# Patient Record
Sex: MALE | Race: WHITE | Employment: OTHER | URBAN - METROPOLITAN AREA
[De-identification: names, ages, dates, MRNs, and addresses within clinical notes are randomized per-mention and may not be internally consistent; named-entity substitution may affect disease eponyms.]

---

## 2017-01-03 ENCOUNTER — TELEPHONE (OUTPATIENT)
Dept: INTERNAL MEDICINE | Age: 82
End: 2017-01-03

## 2017-01-06 ENCOUNTER — TELEPHONE (OUTPATIENT)
Dept: INTERNAL MEDICINE | Age: 82
End: 2017-01-06

## 2017-05-12 ENCOUNTER — OFFICE VISIT (OUTPATIENT)
Dept: INTERNAL MEDICINE | Age: 82
End: 2017-05-12

## 2017-05-12 VITALS
DIASTOLIC BLOOD PRESSURE: 72 MMHG | OXYGEN SATURATION: 98 % | HEART RATE: 68 BPM | HEIGHT: 71 IN | SYSTOLIC BLOOD PRESSURE: 130 MMHG | WEIGHT: 211 LBS | BODY MASS INDEX: 29.54 KG/M2

## 2017-05-12 DIAGNOSIS — K59.01 SLOW TRANSIT CONSTIPATION: ICD-10-CM

## 2017-05-12 DIAGNOSIS — I25.10 CORONARY ARTERY DISEASE INVOLVING NATIVE CORONARY ARTERY OF NATIVE HEART WITHOUT ANGINA PECTORIS: ICD-10-CM

## 2017-05-12 DIAGNOSIS — K51.00 ULCERATIVE PANCOLITIS WITHOUT COMPLICATION (HCC): ICD-10-CM

## 2017-05-12 DIAGNOSIS — E78.2 MIXED HYPERLIPIDEMIA: ICD-10-CM

## 2017-05-12 PROCEDURE — 4040F PNEUMOC VAC/ADMIN/RCVD: CPT | Performed by: INTERNAL MEDICINE

## 2017-05-12 PROCEDURE — 99214 OFFICE O/P EST MOD 30 MIN: CPT | Performed by: INTERNAL MEDICINE

## 2017-05-12 PROCEDURE — 1123F ACP DISCUSS/DSCN MKR DOCD: CPT | Performed by: INTERNAL MEDICINE

## 2017-05-12 PROCEDURE — G8599 NO ASA/ANTIPLAT THER USE RNG: HCPCS | Performed by: INTERNAL MEDICINE

## 2017-05-12 PROCEDURE — G8427 DOCREV CUR MEDS BY ELIG CLIN: HCPCS | Performed by: INTERNAL MEDICINE

## 2017-05-12 PROCEDURE — 4004F PT TOBACCO SCREEN RCVD TLK: CPT | Performed by: INTERNAL MEDICINE

## 2017-05-12 PROCEDURE — G8420 CALC BMI NORM PARAMETERS: HCPCS | Performed by: INTERNAL MEDICINE

## 2017-05-12 ASSESSMENT — ENCOUNTER SYMPTOMS
CONSTIPATION: 1
CHEST TIGHTNESS: 0
RESPIRATORY NEGATIVE: 1
SHORTNESS OF BREATH: 0
WHEEZING: 0

## 2017-09-27 ENCOUNTER — TELEPHONE (OUTPATIENT)
Dept: INTERNAL MEDICINE | Age: 82
End: 2017-09-27

## 2017-10-06 ENCOUNTER — TELEPHONE (OUTPATIENT)
Dept: INTERNAL MEDICINE | Age: 82
End: 2017-10-06

## 2017-10-09 NOTE — TELEPHONE ENCOUNTER
Does not want to schedule into Feb 2018. Would like to be seen before he leaves for [place]  Does not want to see Yevgeniy Preciadojayme. Feels that he has been a patient long enough and should be able to get in with his Physician. Patient leaves for [place] in November. He is able to come in November 13,14,15 or 16th.

## 2017-11-07 ENCOUNTER — OFFICE VISIT (OUTPATIENT)
Dept: INTERNAL MEDICINE | Age: 82
End: 2017-11-07

## 2017-11-07 VITALS
OXYGEN SATURATION: 98 % | HEART RATE: 74 BPM | HEIGHT: 71 IN | DIASTOLIC BLOOD PRESSURE: 74 MMHG | WEIGHT: 213.4 LBS | BODY MASS INDEX: 29.88 KG/M2 | SYSTOLIC BLOOD PRESSURE: 138 MMHG

## 2017-11-07 DIAGNOSIS — K51.00 ULCERATIVE PANCOLITIS WITHOUT COMPLICATION (HCC): ICD-10-CM

## 2017-11-07 DIAGNOSIS — Z23 NEED FOR PNEUMOCOCCAL VACCINATION: ICD-10-CM

## 2017-11-07 DIAGNOSIS — I25.10 CORONARY ARTERY DISEASE INVOLVING NATIVE CORONARY ARTERY OF NATIVE HEART WITHOUT ANGINA PECTORIS: ICD-10-CM

## 2017-11-07 DIAGNOSIS — I35.9 AORTIC VALVE DISORDER: ICD-10-CM

## 2017-11-07 DIAGNOSIS — Z12.5 ENCOUNTER FOR SCREENING FOR MALIGNANT NEOPLASM OF PROSTATE: ICD-10-CM

## 2017-11-07 DIAGNOSIS — E78.2 MIXED HYPERLIPIDEMIA: Primary | ICD-10-CM

## 2017-11-07 DIAGNOSIS — Z23 NEED FOR INFLUENZA VACCINATION: ICD-10-CM

## 2017-11-07 DIAGNOSIS — N40.0 BENIGN NON-NODULAR PROSTATIC HYPERPLASIA WITHOUT LOWER URINARY TRACT SYMPTOMS: ICD-10-CM

## 2017-11-07 PROCEDURE — 90732 PPSV23 VACC 2 YRS+ SUBQ/IM: CPT | Performed by: INTERNAL MEDICINE

## 2017-11-07 PROCEDURE — G8417 CALC BMI ABV UP PARAM F/U: HCPCS | Performed by: INTERNAL MEDICINE

## 2017-11-07 PROCEDURE — 1123F ACP DISCUSS/DSCN MKR DOCD: CPT | Performed by: INTERNAL MEDICINE

## 2017-11-07 PROCEDURE — 4040F PNEUMOC VAC/ADMIN/RCVD: CPT | Performed by: INTERNAL MEDICINE

## 2017-11-07 PROCEDURE — 4004F PT TOBACCO SCREEN RCVD TLK: CPT | Performed by: INTERNAL MEDICINE

## 2017-11-07 PROCEDURE — G8427 DOCREV CUR MEDS BY ELIG CLIN: HCPCS | Performed by: INTERNAL MEDICINE

## 2017-11-07 PROCEDURE — 99214 OFFICE O/P EST MOD 30 MIN: CPT | Performed by: INTERNAL MEDICINE

## 2017-11-07 PROCEDURE — G0009 ADMIN PNEUMOCOCCAL VACCINE: HCPCS | Performed by: INTERNAL MEDICINE

## 2017-11-07 PROCEDURE — G8484 FLU IMMUNIZE NO ADMIN: HCPCS | Performed by: INTERNAL MEDICINE

## 2017-11-07 PROCEDURE — G8599 NO ASA/ANTIPLAT THER USE RNG: HCPCS | Performed by: INTERNAL MEDICINE

## 2017-11-07 PROCEDURE — G0008 ADMIN INFLUENZA VIRUS VAC: HCPCS | Performed by: INTERNAL MEDICINE

## 2017-11-07 PROCEDURE — 90662 IIV NO PRSV INCREASED AG IM: CPT | Performed by: INTERNAL MEDICINE

## 2017-11-07 ASSESSMENT — ENCOUNTER SYMPTOMS
SHORTNESS OF BREATH: 0
WHEEZING: 0
CHEST TIGHTNESS: 0
GASTROINTESTINAL NEGATIVE: 1
RESPIRATORY NEGATIVE: 1

## 2017-11-07 NOTE — PROGRESS NOTES
Self- Management Goals for the Patient with High Cholesterol: It is important to have goals to work towards when you have High Cholesterol. Below is a list of goals your doctor would like you to work towards to help control your hyperlipidemia and also maintain and improve your overall health. Please select one of these goals to try before your next follow up visit:    Goal: I will take all my medications as prescribed, and call the office with any problems. Guess your barriers before they happen. Everyone runs into barriers to their goals. You may already know what's going to get in your way. Write down these problems (cost? time? stress? fear?), and think of ways to get around them.      Barriers to success: none  Plan for overcoming my barriers: N/A     Confidence: 10/10  Date goal set: 05/12/2017  Date goal attained:

## 2017-11-07 NOTE — PROGRESS NOTES
Subjective:      Patient ID: Jose Gore is a 80 y.o. male. HPI   Patient is here for a recheck    Vitals:    11/07/17 1146   BP: 138/74   Pulse: 74   SpO2: 98%       Wt Readings from Last 3 Encounters:   11/07/17 213 lb 6.4 oz (96.8 kg)   05/12/17 211 lb (95.7 kg)   11/11/16 211 lb (95.7 kg)     Patient is taking their cholesterol medication and watching diet without problem. He had a colonoscopy 6 weeks ago. All was good. He states his colitis is under control. He has no chest pain or angina. He is following his aortic valve and CAD with Dr Kathy Elder. He had an ECHO that looked OK. Discussed use, benefit, and side effects of prescribed medications. Barriers to medication compliance addressed. All patient questions answered. Pt voiced understanding. Review of Systems   Constitutional: Negative. HENT: Negative. Respiratory: Negative. Negative for chest tightness, shortness of breath and wheezing. Cardiovascular: Negative. Negative for chest pain, palpitations and leg swelling. Gastrointestinal: Negative. Genitourinary: Negative. Musculoskeletal: Negative for arthralgias and myalgias. Neurological: Negative. Objective:   Physical Exam   Constitutional: He appears well-developed and well-nourished. Neck: Trachea normal. No JVD present. Carotid bruit is not present. No edema present. Cardiovascular: Normal rate, regular rhythm, S1 normal and S2 normal.    No murmur heard. Pulmonary/Chest: Effort normal and breath sounds normal. He has no wheezes. He has no rhonchi. He has no rales. Assessment:      See Problem List assessment and plan       Plan:       Aortic valve disorder  ECHO reviewed  Following with cardiology    CAD (coronary artery disease)  Stable  No angina  No chest pain or SOB      Hyperlipidemia  Stable with present treatment  Check fasting lipids      Ulcerative colitis ([address]  Following with GI  Had negative colonoscopy    Benign non-nodular prostatic

## 2017-12-07 ENCOUNTER — TELEPHONE (OUTPATIENT)
Dept: INTERNAL MEDICINE | Age: 82
End: 2017-12-07

## 2017-12-07 NOTE — TELEPHONE ENCOUNTER
Pt has labs done about 3 weeks ago at [facility] in [place]  He wants to know if lab results are available  He does not want them to to through [address]   Please mail results to pt at:    [address]

## 2017-12-29 LAB
ALBUMIN SERPL-MCNC: NORMAL G/DL
ALP BLD-CCNC: NORMAL U/L
ALT SERPL-CCNC: NORMAL U/L
ANION GAP SERPL CALCULATED.3IONS-SCNC: NORMAL MMOL/L
AST SERPL-CCNC: NORMAL U/L
BILIRUB SERPL-MCNC: NORMAL MG/DL (ref 0.1–1.4)
BILIRUBIN, URINE: NORMAL
BLOOD, URINE: NORMAL
BUN BLDV-MCNC: NORMAL MG/DL
CALCIUM SERPL-MCNC: NORMAL MG/DL
CHLORIDE BLD-SCNC: NORMAL MMOL/L
CHOLESTEROL, TOTAL: 146 MG/DL
CHOLESTEROL/HDL RATIO: NORMAL
CLARITY: NORMAL
CO2: NORMAL MMOL/L
COLOR: NORMAL
CREAT SERPL-MCNC: NORMAL MG/DL
GFR CALCULATED: NORMAL
GLUCOSE BLD-MCNC: NORMAL MG/DL
GLUCOSE URINE: NORMAL
HCT VFR BLD CALC: NORMAL % (ref 41–53)
HDLC SERPL-MCNC: 52 MG/DL (ref 35–70)
HEMOGLOBIN: NORMAL G/DL (ref 13.5–17.5)
KETONES, URINE: NORMAL
LDL CHOLESTEROL CALCULATED: 83 MG/DL (ref 0–160)
LEUKOCYTE ESTERASE, URINE: NORMAL
NITRITE, URINE: NORMAL
PH UA: NORMAL (ref 4.5–8)
PLATELET # BLD: NORMAL K/ΜL
POTASSIUM SERPL-SCNC: NORMAL MMOL/L
PROTEIN UA: NORMAL
SODIUM BLD-SCNC: NORMAL MMOL/L
SPECIFIC GRAVITY, URINE: NORMAL
TOTAL PROTEIN: NORMAL
TRIGL SERPL-MCNC: 57 MG/DL
UROBILINOGEN, URINE: NORMAL
VLDLC SERPL CALC-MCNC: 11 MG/DL
WBC # BLD: NORMAL 10^3/ML

## 2018-05-15 ENCOUNTER — OFFICE VISIT (OUTPATIENT)
Dept: INTERNAL MEDICINE | Age: 83
End: 2018-05-15

## 2018-05-15 VITALS
WEIGHT: 212 LBS | BODY MASS INDEX: 29.68 KG/M2 | SYSTOLIC BLOOD PRESSURE: 118 MMHG | OXYGEN SATURATION: 97 % | HEIGHT: 71 IN | HEART RATE: 74 BPM | DIASTOLIC BLOOD PRESSURE: 68 MMHG

## 2018-05-15 DIAGNOSIS — R31.0 GROSS HEMATURIA: ICD-10-CM

## 2018-05-15 DIAGNOSIS — Z13.1 SCREENING FOR DIABETES MELLITUS (DM): ICD-10-CM

## 2018-05-15 DIAGNOSIS — Z12.11 SCREENING FOR COLORECTAL CANCER: ICD-10-CM

## 2018-05-15 DIAGNOSIS — Z12.5 SCREENING FOR PROSTATE CANCER: ICD-10-CM

## 2018-05-15 DIAGNOSIS — Z12.12 SCREENING FOR COLORECTAL CANCER: ICD-10-CM

## 2018-05-15 DIAGNOSIS — K59.01 SLOW TRANSIT CONSTIPATION: ICD-10-CM

## 2018-05-15 DIAGNOSIS — I35.9 AORTIC VALVE DISORDER: ICD-10-CM

## 2018-05-15 DIAGNOSIS — Z00.00 ROUTINE GENERAL MEDICAL EXAMINATION AT A HEALTH CARE FACILITY: Primary | ICD-10-CM

## 2018-05-15 DIAGNOSIS — E78.2 MIXED HYPERLIPIDEMIA: ICD-10-CM

## 2018-05-15 DIAGNOSIS — Z13.6 SCREENING FOR CARDIOVASCULAR CONDITION: ICD-10-CM

## 2018-05-15 LAB
CONTROL: NORMAL
HEMOCCULT STL QL: NEGATIVE

## 2018-05-15 PROCEDURE — G0439 PPPS, SUBSEQ VISIT: HCPCS | Performed by: INTERNAL MEDICINE

## 2018-05-15 PROCEDURE — 4004F PT TOBACCO SCREEN RCVD TLK: CPT | Performed by: INTERNAL MEDICINE

## 2018-05-15 PROCEDURE — G8417 CALC BMI ABV UP PARAM F/U: HCPCS | Performed by: INTERNAL MEDICINE

## 2018-05-15 PROCEDURE — G0328 FECAL BLOOD SCRN IMMUNOASSAY: HCPCS | Performed by: INTERNAL MEDICINE

## 2018-05-15 PROCEDURE — 99213 OFFICE O/P EST LOW 20 MIN: CPT | Performed by: INTERNAL MEDICINE

## 2018-05-15 PROCEDURE — 93000 ELECTROCARDIOGRAM COMPLETE: CPT | Performed by: INTERNAL MEDICINE

## 2018-05-15 PROCEDURE — 4040F PNEUMOC VAC/ADMIN/RCVD: CPT | Performed by: INTERNAL MEDICINE

## 2018-05-15 PROCEDURE — G8599 NO ASA/ANTIPLAT THER USE RNG: HCPCS | Performed by: INTERNAL MEDICINE

## 2018-05-15 PROCEDURE — G8427 DOCREV CUR MEDS BY ELIG CLIN: HCPCS | Performed by: INTERNAL MEDICINE

## 2018-05-15 PROCEDURE — 1123F ACP DISCUSS/DSCN MKR DOCD: CPT | Performed by: INTERNAL MEDICINE

## 2018-05-15 ASSESSMENT — ENCOUNTER SYMPTOMS
BACK PAIN: 0
SINUS PRESSURE: 0
CHEST TIGHTNESS: 0
ABDOMINAL PAIN: 0
RHINORRHEA: 0
VOMITING: 0
EYE ITCHING: 0
VOICE CHANGE: 0
RECTAL PAIN: 0
EYE DISCHARGE: 0
TROUBLE SWALLOWING: 0
BLOOD IN STOOL: 0
EYE REDNESS: 0
ABDOMINAL DISTENTION: 0
APNEA: 0
COLOR CHANGE: 0
STRIDOR: 0
CONSTIPATION: 1
SORE THROAT: 0
ANAL BLEEDING: 0
CHOKING: 0
SHORTNESS OF BREATH: 0
WHEEZING: 0
PHOTOPHOBIA: 0
COUGH: 0
DIARRHEA: 0
EYE PAIN: 0
NAUSEA: 0
FACIAL SWELLING: 0

## 2018-05-15 ASSESSMENT — LIFESTYLE VARIABLES: HOW OFTEN DO YOU HAVE A DRINK CONTAINING ALCOHOL: 0

## 2018-05-15 ASSESSMENT — ANXIETY QUESTIONNAIRES: GAD7 TOTAL SCORE: 0

## 2018-05-15 ASSESSMENT — PATIENT HEALTH QUESTIONNAIRE - PHQ9: SUM OF ALL RESPONSES TO PHQ QUESTIONS 1-9: 0

## 2018-05-18 DIAGNOSIS — Z12.5 SCREENING FOR PROSTATE CANCER: ICD-10-CM

## 2018-05-18 DIAGNOSIS — R31.0 GROSS HEMATURIA: ICD-10-CM

## 2018-05-18 DIAGNOSIS — Z00.00 ROUTINE GENERAL MEDICAL EXAMINATION AT A HEALTH CARE FACILITY: ICD-10-CM

## 2018-05-18 DIAGNOSIS — Z13.6 SCREENING FOR CARDIOVASCULAR CONDITION: ICD-10-CM

## 2018-05-18 DIAGNOSIS — E78.2 MIXED HYPERLIPIDEMIA: ICD-10-CM

## 2018-05-18 LAB
A/G RATIO: 2 (ref 1.1–2.2)
ALBUMIN SERPL-MCNC: 4.1 G/DL (ref 3.4–5)
ALP BLD-CCNC: 53 U/L (ref 40–129)
ALT SERPL-CCNC: 18 U/L (ref 10–40)
ANION GAP SERPL CALCULATED.3IONS-SCNC: 11 MMOL/L (ref 3–16)
AST SERPL-CCNC: 21 U/L (ref 15–37)
BASOPHILS ABSOLUTE: 0 K/UL (ref 0–0.2)
BASOPHILS RELATIVE PERCENT: 0.6 %
BILIRUB SERPL-MCNC: 1.5 MG/DL (ref 0–1)
BILIRUBIN URINE: NEGATIVE
BLOOD, URINE: NEGATIVE
BUN BLDV-MCNC: 24 MG/DL (ref 7–20)
CALCIUM SERPL-MCNC: 8.7 MG/DL (ref 8.3–10.6)
CHLORIDE BLD-SCNC: 102 MMOL/L (ref 99–110)
CHOLESTEROL, TOTAL: 125 MG/DL (ref 0–199)
CLARITY: CLEAR
CO2: 28 MMOL/L (ref 21–32)
COLOR: YELLOW
CREAT SERPL-MCNC: 1 MG/DL (ref 0.8–1.3)
EOSINOPHILS ABSOLUTE: 0.1 K/UL (ref 0–0.6)
EOSINOPHILS RELATIVE PERCENT: 3.1 %
EPITHELIAL CELLS, UA: 0 /HPF (ref 0–5)
GFR AFRICAN AMERICAN: >60
GFR NON-AFRICAN AMERICAN: >60
GLOBULIN: 2.1 G/DL
GLUCOSE BLD-MCNC: 91 MG/DL (ref 70–99)
GLUCOSE URINE: NEGATIVE MG/DL
HCT VFR BLD CALC: 39.8 % (ref 40.5–52.5)
HDLC SERPL-MCNC: 52 MG/DL (ref 40–60)
HEMOGLOBIN: 13.9 G/DL (ref 13.5–17.5)
HYALINE CASTS: 0 /LPF (ref 0–8)
KETONES, URINE: NEGATIVE MG/DL
LDL CHOLESTEROL CALCULATED: 63 MG/DL
LEUKOCYTE ESTERASE, URINE: NEGATIVE
LYMPHOCYTES ABSOLUTE: 0.8 K/UL (ref 1–5.1)
LYMPHOCYTES RELATIVE PERCENT: 17.9 %
MCH RBC QN AUTO: 34.6 PG (ref 26–34)
MCHC RBC AUTO-ENTMCNC: 34.9 G/DL (ref 31–36)
MCV RBC AUTO: 99 FL (ref 80–100)
MICROSCOPIC EXAMINATION: YES
MONOCYTES ABSOLUTE: 0.4 K/UL (ref 0–1.3)
MONOCYTES RELATIVE PERCENT: 8.9 %
NEUTROPHILS ABSOLUTE: 3 K/UL (ref 1.7–7.7)
NEUTROPHILS RELATIVE PERCENT: 69.5 %
NITRITE, URINE: NEGATIVE
PDW BLD-RTO: 16.4 % (ref 12.4–15.4)
PH UA: 6.5
PLATELET # BLD: 175 K/UL (ref 135–450)
PMV BLD AUTO: 9.4 FL (ref 5–10.5)
POTASSIUM SERPL-SCNC: 4.8 MMOL/L (ref 3.5–5.1)
PROSTATE SPECIFIC ANTIGEN: 11.81 NG/ML (ref 0–4)
PROTEIN UA: ABNORMAL MG/DL
RBC # BLD: 4.02 M/UL (ref 4.2–5.9)
RBC UA: 3 /HPF (ref 0–4)
SODIUM BLD-SCNC: 141 MMOL/L (ref 136–145)
SPECIFIC GRAVITY UA: 1.02
TOTAL PROTEIN: 6.2 G/DL (ref 6.4–8.2)
TRIGL SERPL-MCNC: 51 MG/DL (ref 0–150)
UROBILINOGEN, URINE: 1 E.U./DL
VLDLC SERPL CALC-MCNC: 10 MG/DL
WBC # BLD: 4.3 K/UL (ref 4–11)
WBC UA: 1 /HPF (ref 0–5)

## 2018-05-20 LAB — VITAMIN D 1,25-DIHYDROXY: 31.5 PG/ML (ref 19.9–79.3)

## 2018-06-01 ENCOUNTER — OFFICE VISIT (OUTPATIENT)
Dept: INTERNAL MEDICINE | Age: 83
End: 2018-06-01

## 2018-06-01 VITALS
BODY MASS INDEX: 30.06 KG/M2 | OXYGEN SATURATION: 97 % | HEIGHT: 70 IN | HEART RATE: 64 BPM | SYSTOLIC BLOOD PRESSURE: 134 MMHG | TEMPERATURE: 98.6 F | WEIGHT: 210 LBS | DIASTOLIC BLOOD PRESSURE: 78 MMHG

## 2018-06-01 DIAGNOSIS — H60.311 ACUTE DIFFUSE OTITIS EXTERNA OF RIGHT EAR: Primary | ICD-10-CM

## 2018-06-01 PROCEDURE — G8427 DOCREV CUR MEDS BY ELIG CLIN: HCPCS | Performed by: NURSE PRACTITIONER

## 2018-06-01 PROCEDURE — G8417 CALC BMI ABV UP PARAM F/U: HCPCS | Performed by: NURSE PRACTITIONER

## 2018-06-01 PROCEDURE — 1123F ACP DISCUSS/DSCN MKR DOCD: CPT | Performed by: NURSE PRACTITIONER

## 2018-06-01 PROCEDURE — 4004F PT TOBACCO SCREEN RCVD TLK: CPT | Performed by: NURSE PRACTITIONER

## 2018-06-01 PROCEDURE — G8599 NO ASA/ANTIPLAT THER USE RNG: HCPCS | Performed by: NURSE PRACTITIONER

## 2018-06-01 PROCEDURE — 4040F PNEUMOC VAC/ADMIN/RCVD: CPT | Performed by: NURSE PRACTITIONER

## 2018-06-01 PROCEDURE — 99213 OFFICE O/P EST LOW 20 MIN: CPT | Performed by: NURSE PRACTITIONER

## 2018-06-01 PROCEDURE — 4130F TOPICAL PREP RX AOE: CPT | Performed by: NURSE PRACTITIONER

## 2018-06-01 RX ORDER — CIPROFLOXACIN AND DEXAMETHASONE 3; 1 MG/ML; MG/ML
4 SUSPENSION/ DROPS AURICULAR (OTIC) 2 TIMES DAILY
Qty: 7.5 ML | Refills: 0 | Status: SHIPPED | OUTPATIENT
Start: 2018-06-01 | End: 2018-09-10 | Stop reason: SDUPTHER

## 2018-06-01 ASSESSMENT — ENCOUNTER SYMPTOMS
VOMITING: 0
RHINORRHEA: 0
SINUS PRESSURE: 0
CONSTIPATION: 0
COUGH: 0
DIARRHEA: 0
SINUS PAIN: 0
WHEEZING: 0
FACIAL SWELLING: 0
SHORTNESS OF BREATH: 0
NAUSEA: 0

## 2018-07-25 ENCOUNTER — PROCEDURE VISIT (OUTPATIENT)
Dept: SURGERY | Age: 83
End: 2018-07-25

## 2018-07-25 VITALS
OXYGEN SATURATION: 98 % | BODY MASS INDEX: 30.28 KG/M2 | TEMPERATURE: 98.1 F | SYSTOLIC BLOOD PRESSURE: 150 MMHG | WEIGHT: 211 LBS | DIASTOLIC BLOOD PRESSURE: 90 MMHG | HEART RATE: 63 BPM

## 2018-07-25 DIAGNOSIS — C44.02 SQUAMOUS CELL CARCINOMA OF LIP: Primary | ICD-10-CM

## 2018-07-25 NOTE — PATIENT INSTRUCTIONS
[facility] Office Hours:    Monday-Thursday  7:30 AM-4:30 PM    Friday  9:00 AM-3:00 PM    The patient was given post operative care instructions for second intention healing. All questions answered.

## 2018-07-25 NOTE — PROGRESS NOTES
MOHS PROCEDURE NOTE    PHYSICIAN:  Leroy Aceves. Ana Rosa Gates MD    ASSISTANT: Devi Dos Santos RN and Lila Hinson     REFERRING PROVIDER:  Nubia Gonzalez MD, Ph.D    PREOPERATIVE DIAGNOSIS: Invasive well-differentiated Squamous Cell Carcinoma     SPECIFIC MOHS INDICATIONS:  location    [facility] SCORIN/9    POSTOPERATIVE DIAGNOSIS: SAME    LOCATION: Left lower lip    OPERATIVE PROCEDURE:  MOHS MICROGRAPHIC SURGERY    RECONSTRUCTION OF DEFECT: Second Intention Wound Healing    PREOPERATIVE SIZE: 9x6 MM    DEFECT SIZE: 23x8 MM    LENGTH OF REPAIRED WOUND/SIZE OF FLAP/SIZE OF GRAFT:  N/A MM    ANESTHESIA:  2mL 1% lidocaine with epinephrine 1:100,000 buffered. EBL:  MINIMAL    DURATION OF PROCEDURE:  1 HOURS    POSTOPERATIVE OBSERVATION: 1 HOUR    SPECIMENS:  SEE MOHS MAP    COMPLICATIONS:  NONE    DESCRIPTION OF PROCEDURE:  The patient was given a mirror, as appropriate, and the biopsy site was identified, marked with a surgical marking pen, and verified by the patient. Options for treatment were discussed and the patient was informed that Mohs surgery was the selected treatment based on its lower recurrence rate, given the features listed above, as compared to other treatment modalities such as excision, radiation, or curettage, and agreed with this treatment plan. Risks and benefits including bruising, swelling, bleeding, infection, nerve injury, recurrence, and scarring were discussed with the patient prior to the procedure and a written consent detailing these and other risks was reviewed with the patient and signed. There was a time out for person and procedure verification. The surgical site was prepped with an antiseptic solution. Application of an antiseptic solution was repeated before each surgical stage. Stage I:  The clinically-apparent tumor was carefully defined and debulked, determining the edge of the surgical excision.     A thin layer of tumor-laden tissue was excised with a narrow margin of normal-appearing skin, using the technique of Mohs. A map was prepared to correspond to the area of skin from which it was excised. Hemostasis was achieved using electrosurgery. The wound was bandaged. The tissue was prepared for the cryostat and sectioned. 1 section(s) prepared. Each section was coded, cut, and stained for microscopic examination. The entire base and margins of the excised piece of tissue were examined by the surgeon. Stage I, II:  Superficially invasive well differentiated SCC. The remaining tumor was noted and the next stage was performed. Stage II, III:  A thin layer of tissue was removed at the histologically-identified sites of remaining tumor. The entire procedure as described in stage I was repeated to process the tissue according to Mohs technique. 1 section(s) prepared for stage II and III. No tumor was identified at the peripheral margins of stage III of microscopically controlled surgery. Actinic cheilitis present. DEFECT MANAGEMENT:    REPAIR DESCRIPTION:  After discussion with the patient regarding the various options, it was decided to allow the defect to heal by second intention (granulation). Healing time, wound care and scarring were discussed with the patient and he/she feels capable of taking care of the wound. WOUND COVERAGE:  The wound was cleaned with normal saline solution, dried off, Aquaphor ointment was applied, and the wound was covered. A dressing was applied for stabilization and light pressure. The patient was given detailed oral and written instructions on postoperative care. There were no complications. The patient left the Unit in good medical condition. FOLLOW-UP: follow up wound check in 2 weeks.

## 2018-07-26 ENCOUNTER — TELEPHONE (OUTPATIENT)
Dept: SURGERY | Age: 83
End: 2018-07-26

## 2018-08-03 ENCOUNTER — TELEPHONE (OUTPATIENT)
Dept: SURGERY | Age: 83
End: 2018-08-03

## 2018-08-03 ENCOUNTER — NURSE ONLY (OUTPATIENT)
Dept: SURGERY | Age: 83
End: 2018-08-03

## 2018-08-03 DIAGNOSIS — Z51.89 VISIT FOR WOUND CHECK: Primary | ICD-10-CM

## 2018-08-03 NOTE — PATIENT INSTRUCTIONS
[facility] Office Hours:    Monday-Thursday  7:30 AM-4:30 PM    Friday  9:00 AM-3:00 PM

## 2018-08-08 ENCOUNTER — PROCEDURE VISIT (OUTPATIENT)
Dept: SURGERY | Age: 83
End: 2018-08-08

## 2018-08-08 VITALS
BODY MASS INDEX: 30.28 KG/M2 | HEART RATE: 70 BPM | TEMPERATURE: 98.2 F | SYSTOLIC BLOOD PRESSURE: 184 MMHG | DIASTOLIC BLOOD PRESSURE: 91 MMHG | OXYGEN SATURATION: 97 % | WEIGHT: 211 LBS

## 2018-08-08 DIAGNOSIS — C44.311 BASAL CELL CARCINOMA (BCC) OF RIGHT NASAL TIP: Primary | ICD-10-CM

## 2018-08-08 PROCEDURE — 17311 MOHS 1 STAGE H/N/HF/G: CPT | Performed by: DERMATOLOGY

## 2018-08-08 PROCEDURE — 17312 MOHS ADDL STAGE: CPT | Performed by: DERMATOLOGY

## 2018-08-08 PROCEDURE — 14060 TIS TRNFR E/N/E/L 10 SQ CM/<: CPT | Performed by: DERMATOLOGY

## 2018-08-08 NOTE — PROGRESS NOTES
MOHS PROCEDURE NOTE    PHYSICIAN:  Danilo Mccord. Cassy Rose MD    ASSISTANT: Sadia Hudson LPN and Lila Blackwell       REFERRING PROVIDER:  Kerrie Parker MD, Ph.D      PREOPERATIVE DIAGNOSIS: Superficial Basal Cell Carcinoma     SPECIFIC MOHS INDICATIONS:  location and clinically ill-defined borders    AUC SCORIN/9    POSTOPERATIVE DIAGNOSIS: SAME    LOCATION: right nasal tip    OPERATIVE PROCEDURE:  MOHS MICROGRAPHIC SURGERY    RECONSTRUCTION OF DEFECT: V to Y Advancement Flap (non-tunneled island pedicle flap)    PREOPERATIVE SIZE: 11x9 MM    DEFECT SIZE: 14x12 MM    LENGTH OF REPAIRED WOUND/SIZE OF FLAP/SIZE OF GRAFT:  28x9 MM = 2.52cm2    ANESTHESIA:  5.5mL 1% lidocaine with epinephrine 1:100,000 buffered. EBL:  MINIMAL    DURATION OF PROCEDURE:  2 HOURS    POSTOPERATIVE OBSERVATION: 1 HOUR    SPECIMENS:  SEE MOHS MAP    COMPLICATIONS:  NONE    DESCRIPTION OF PROCEDURE:  The patient was given a mirror, as appropriate, and the biopsy site was identified, marked with a surgical marking pen, and verified by the patient. Options for treatment were discussed and the patient was informed that Mohs surgery was the selected treatment based on its lower recurrence rate, given the features listed above, as compared to other treatment modalities such as excision, radiation, or curettage, and agreed with this treatment plan. Risks and benefits including bruising, swelling, bleeding, infection, nerve injury, recurrence, and scarring were discussed with the patient prior to the procedure and a written consent detailing these and other risks was reviewed with the patient and signed. There was a time out for person and procedure verification. The surgical site was prepped with an antiseptic solution. Application of an antiseptic solution was repeated before each surgical stage. Stage I:  The clinically-apparent tumor was carefully defined and debulked, determining the edge of the surgical excision.     A thin layer of tumor-laden tissue was excised with a narrow margin of normal-appearing skin, using the technique of Mohs. A map was prepared to correspond to the area of skin from which it was excised. Hemostasis was achieved using electrosurgery. The wound was bandaged. The tissue was prepared for the cryostat and sectioned. 1 section(s) prepared. Each section was coded, cut, and stained for microscopic examination. The entire base and margins of the excised piece of tissue were examined by the surgeon. Stage I:  Infiltrative BCC: small irregular clumps of basaloid cells present as narrow strands with limited peripheral palisading. The remaining tumor was noted and the next stage was performed. Stage II:  A thin layer of tissue was removed at the histologically-identified sites of remaining tumor. The entire procedure as described in stage I was repeated to process the tissue according to Mohs technique. 1 section(s) prepared for stage II. No tumor was identified at the peripheral margins of stage II of microscopically controlled surgery. DEFECT MANAGEMENT:    REPAIR DESCRIPTION:  Various closure modalities were discussed with the patient, and it was decided that a V to Y Advancement Flap (non-tunneled island pedicle flap) would best preserve normal anatomic and functional relationships. Additional risks of flap necrosis, irregular scar line and wound dehiscence were discussed. The patient was placed on the procedure room table. The area was anesthetized with 1% lidocaine with epinephrine 1:100,000 buffered, was given a sterile prep using Chlorhexidine gluconate 4% solution  and draped in the usual sterile fashion. Incisions were made in the appropriate fashion for the flap designed. The wound was extensively undermined and meticulous hemostasis were obtained using spot monopolar electrocoagulation. The flap was elevated and advanced into the primary defect.  Subcutaneous dead space and dermis were closed using  5-0  Vicryl and the epidermis was approximated using 6-0 Ethilon running epidermal sutures . WOUND COVERAGE:  The wound was cleaned with normal saline solution, dried off, Aquaphor ointment was applied, and the wound was covered. A dressing was applied for stabilization and light pressure. The patient was given detailed oral and written instructions on postoperative care. There were no complications. The patient left the Unit in good medical condition. FOLLOW-UP:  The patient will return for suture removal in 7 days.

## 2018-08-08 NOTE — PATIENT INSTRUCTIONS
[facility] Office Hours:    Monday-Thursday  7:30 AM-4:30 PM    Friday  9:00 AM-3:00 PM    Patient was given post operative instructions for facial sutures. All questions were reviewed.

## 2018-08-09 ENCOUNTER — TELEPHONE (OUTPATIENT)
Dept: SURGERY | Age: 83
End: 2018-08-09

## 2018-08-09 NOTE — TELEPHONE ENCOUNTER
The patient was in the office 8/8/18 for mohs located on the right nasal tip with V to Y Advancement Flap (non-tunneled island pedicle flap)  repair. The patient tolerated the procedure well and left the office in good condition. A post-operative telephone call was placed at 2:37pm on 8/9/18 in order to check on the patient's recovery process. The patient's wife reported the patient was doing well and had no complaints. All questions answered.

## 2018-08-14 ENCOUNTER — OFFICE VISIT (OUTPATIENT)
Dept: SURGERY | Age: 83
End: 2018-08-14

## 2018-08-14 DIAGNOSIS — Z48.02 VISIT FOR SUTURE REMOVAL: Primary | ICD-10-CM

## 2018-08-14 PROCEDURE — 99024 POSTOP FOLLOW-UP VISIT: CPT | Performed by: DERMATOLOGY

## 2018-08-14 NOTE — PROGRESS NOTES
S:  The patient is here for suture removal s/p Mohs surgery on the right nasal tip and V to Y Advancement Flap (non-tunneled island pedicle flap) repair, 1 week(s) ago. The site appears well-healed without signs of infection (redness, pain or discharge). The sutures were removed. Flap is slightly macerated. Wound care and activity instructions given. The patient was scheduled for follow-up in 2 weeks for scar/wound check. The patient was scheduled for f/u with General Dermatology per their instructions.

## 2018-08-14 NOTE — PATIENT INSTRUCTIONS
[facility] Office Hours:    Monday-Thursday  7:30 AM-4:30 PM    Friday  9:00 AM-3:00 PM    WOUND CARE AFTER SUTURE REMOVAL    After your stiches have been removed, your scar is still very fragile. In fact, scars continue to change and evolve, what we call remodel, for about a year after your procedure. Follow the following steps below to ensure that your scar heals well. Instructions    1. If Steri-strips were applied, keep them on until they fall off on their own. 2. Protect your scar from the sun. Use a sunscreen or bandage to cover your scar. Claudette Dunks exposure can cause your scar to become discolored and appear red or brown. 3. To help soften your scar more rapidly, it is helpful, but not necessary, for you to   massage the scar gently each night for twenty minutes. 4. Spitting suture. Occasionally, an inside suture (stitch) does not completely dissolve. When this happens, (generally 4-8 weeks after surgery), it causes a bump or pimple to form on the scar. This is easily removed and is not at all serious. It   does not mean the skin cancer has returned. Contact us if it happens, but do not be alarmed. 5. If you scar becomes tender, itchy or becomes very large, let Dr. Sonido Andrade know. There    are treatments that can improve the appearance of your scar or help make it more comfortable.

## 2018-08-17 LAB
A/G RATIO: 1.9 (ref 1.1–2.2)
ALBUMIN SERPL-MCNC: 4.1 G/DL (ref 3.4–5)
ALP BLD-CCNC: 65 U/L (ref 40–129)
ALT SERPL-CCNC: 18 U/L (ref 10–40)
ANION GAP SERPL CALCULATED.3IONS-SCNC: 12 MMOL/L (ref 3–16)
AST SERPL-CCNC: 19 U/L (ref 15–37)
BILIRUB SERPL-MCNC: 1.3 MG/DL (ref 0–1)
BUN BLDV-MCNC: 23 MG/DL (ref 7–20)
CALCIUM SERPL-MCNC: 9.1 MG/DL (ref 8.3–10.6)
CHLORIDE BLD-SCNC: 101 MMOL/L (ref 99–110)
CO2: 30 MMOL/L (ref 21–32)
CREAT SERPL-MCNC: 1.1 MG/DL (ref 0.8–1.3)
GFR AFRICAN AMERICAN: >60
GFR NON-AFRICAN AMERICAN: >60
GLOBULIN: 2.2 G/DL
GLUCOSE BLD-MCNC: 95 MG/DL (ref 70–99)
HCT VFR BLD CALC: 41.6 % (ref 40.5–52.5)
HEMOGLOBIN: 14 G/DL (ref 13.5–17.5)
MCH RBC QN AUTO: 34.2 PG (ref 26–34)
MCHC RBC AUTO-ENTMCNC: 33.7 G/DL (ref 31–36)
MCV RBC AUTO: 101.4 FL (ref 80–100)
PDW BLD-RTO: 17.1 % (ref 12.4–15.4)
PLATELET # BLD: 191 K/UL (ref 135–450)
PMV BLD AUTO: 9.6 FL (ref 5–10.5)
POTASSIUM SERPL-SCNC: 4.7 MMOL/L (ref 3.5–5.1)
RBC # BLD: 4.1 M/UL (ref 4.2–5.9)
SODIUM BLD-SCNC: 143 MMOL/L (ref 136–145)
TOTAL PROTEIN: 6.3 G/DL (ref 6.4–8.2)
WBC # BLD: 5.4 K/UL (ref 4–11)

## 2018-08-29 ENCOUNTER — OFFICE VISIT (OUTPATIENT)
Dept: SURGERY | Age: 83
End: 2018-08-29

## 2018-08-29 DIAGNOSIS — Z51.89 VISIT FOR WOUND CHECK: Primary | ICD-10-CM

## 2018-08-29 PROCEDURE — 99024 POSTOP FOLLOW-UP VISIT: CPT | Performed by: DERMATOLOGY

## 2018-09-10 ENCOUNTER — OFFICE VISIT (OUTPATIENT)
Dept: INTERNAL MEDICINE | Age: 83
End: 2018-09-10

## 2018-09-10 VITALS
SYSTOLIC BLOOD PRESSURE: 144 MMHG | BODY MASS INDEX: 30.78 KG/M2 | WEIGHT: 215 LBS | OXYGEN SATURATION: 98 % | HEIGHT: 70 IN | HEART RATE: 78 BPM | DIASTOLIC BLOOD PRESSURE: 89 MMHG

## 2018-09-10 DIAGNOSIS — H60.311 ACUTE DIFFUSE OTITIS EXTERNA OF RIGHT EAR: ICD-10-CM

## 2018-09-10 DIAGNOSIS — Z23 NEEDS FLU SHOT: Primary | ICD-10-CM

## 2018-09-10 PROCEDURE — G8599 NO ASA/ANTIPLAT THER USE RNG: HCPCS | Performed by: NURSE PRACTITIONER

## 2018-09-10 PROCEDURE — G8417 CALC BMI ABV UP PARAM F/U: HCPCS | Performed by: NURSE PRACTITIONER

## 2018-09-10 PROCEDURE — 99213 OFFICE O/P EST LOW 20 MIN: CPT | Performed by: NURSE PRACTITIONER

## 2018-09-10 PROCEDURE — 4130F TOPICAL PREP RX AOE: CPT | Performed by: NURSE PRACTITIONER

## 2018-09-10 PROCEDURE — 4004F PT TOBACCO SCREEN RCVD TLK: CPT | Performed by: NURSE PRACTITIONER

## 2018-09-10 PROCEDURE — 1101F PT FALLS ASSESS-DOCD LE1/YR: CPT | Performed by: NURSE PRACTITIONER

## 2018-09-10 PROCEDURE — 1123F ACP DISCUSS/DSCN MKR DOCD: CPT | Performed by: NURSE PRACTITIONER

## 2018-09-10 PROCEDURE — 4040F PNEUMOC VAC/ADMIN/RCVD: CPT | Performed by: NURSE PRACTITIONER

## 2018-09-10 PROCEDURE — 90662 IIV NO PRSV INCREASED AG IM: CPT | Performed by: NURSE PRACTITIONER

## 2018-09-10 PROCEDURE — G8427 DOCREV CUR MEDS BY ELIG CLIN: HCPCS | Performed by: NURSE PRACTITIONER

## 2018-09-10 PROCEDURE — G0008 ADMIN INFLUENZA VIRUS VAC: HCPCS | Performed by: NURSE PRACTITIONER

## 2018-09-10 RX ORDER — CIPROFLOXACIN AND DEXAMETHASONE 3; 1 MG/ML; MG/ML
4 SUSPENSION/ DROPS AURICULAR (OTIC) 2 TIMES DAILY
Qty: 7.5 ML | Refills: 0 | Status: SHIPPED | OUTPATIENT
Start: 2018-09-10 | End: 2018-09-17

## 2018-09-10 ASSESSMENT — ENCOUNTER SYMPTOMS
SINUS PRESSURE: 0
SINUS PAIN: 0
SHORTNESS OF BREATH: 0
WHEEZING: 0
COUGH: 0

## 2018-09-10 ASSESSMENT — PATIENT HEALTH QUESTIONNAIRE - PHQ9
SUM OF ALL RESPONSES TO PHQ QUESTIONS 1-9: 0
2. FEELING DOWN, DEPRESSED OR HOPELESS: 0
SUM OF ALL RESPONSES TO PHQ9 QUESTIONS 1 & 2: 0
SUM OF ALL RESPONSES TO PHQ QUESTIONS 1-9: 0
1. LITTLE INTEREST OR PLEASURE IN DOING THINGS: 0

## 2018-09-10 NOTE — PROGRESS NOTES
9/10/2018     Ethel Rodrigues (:  1934) is a 80 y.o. male, here for evaluation of the following medical concerns:    HPI  Marquis Bowens is here today for left ear discomfort. He states that he has new hearing aids that he is trying out and they have felt clogged. He states that there is minimal pain. Started a few days ago. Had an ear infection similar a few months ago and treated with ear drops and it improved. He denies any fevers. No other URI symptoms. Review of Systems   Constitutional: Negative for fatigue and fever. HENT: Positive for ear discharge (left ear) and ear pain (minimal ). Negative for sinus pain and sinus pressure. Respiratory: Negative for cough, shortness of breath and wheezing. Cardiovascular: Negative for chest pain. Hematological: Negative for adenopathy. Prior to Visit Medications    Medication Sig Taking? Authorizing Provider   ciprofloxacin-dexamethasone (CIPRODEX) 0.3-0.1 % otic suspension Place 4 drops into the left ear 2 times daily for 7 days Yes LIZZ Shen - CNP   linaclotide (LINZESS) 145 MCG capsule Take 1 capsule by mouth every morning (before breakfast) Yes Jorge Forman MD   Vitamin D (CHOLECALCIFEROL) 1000 UNITS CAPS capsule Take 1 capsule by mouth daily Yes Jorge Forman MD   sildenafil (VIAGRA) 100 MG tablet Take 1 tablet by mouth as needed for Erectile Dysfunction Yes Jorge Forman MD   simvastatin (ZOCOR) 20 MG tablet TAKE 1 TABLET IN THE EVENING Yes Jorge Forman MD   latanoprost (XALATAN) 0.005 % ophthalmic solution Place 1 drop into both eyes nightly. Yes Historical Provider, MD   metoprolol (TOPROL XL) 25 MG XL tablet Take 1 tablet by mouth daily. Yes Jorge Forman MD   aspirin EC 81 MG EC tablet Take 1 tablet by mouth daily. Yes Jorge Forman MD   mercaptopurine (PURINETHOL) 50 MG tablet Take 50 mg by mouth daily.  Yes Historical Provider, MD        Social History   Substance Use Topics    Smoking status: Current Some Day Smoker     Years: 38.00     Types: Cigars    Smokeless tobacco: Never Used    Alcohol use 0.0 oz/week      Comment: socially        Vitals:    09/10/18 1010   BP: (!) 164/96   Site: Left Upper Arm   Position: Sitting   Cuff Size: Medium Adult   Pulse: 78   SpO2: 98%   Weight: 215 lb (97.5 kg)   Height: 5' 10\" (1.778 m)     Estimated body mass index is 30.85 kg/m² as calculated from the following:    Height as of this encounter: 5' 10\" (1.778 m). Weight as of this encounter: 215 lb (97.5 kg). Physical Exam   Constitutional: He appears well-developed and well-nourished. HENT:   Right Ear: Hearing, tympanic membrane, external ear and ear canal normal.   Left Ear: Hearing, tympanic membrane and ear canal normal. There is drainage and tenderness. Nose: No mucosal edema or rhinorrhea. Right sinus exhibits no maxillary sinus tenderness and no frontal sinus tenderness. Left sinus exhibits no maxillary sinus tenderness and no frontal sinus tenderness. Mouth/Throat: No oropharyngeal exudate, posterior oropharyngeal edema, posterior oropharyngeal erythema or tonsillar abscesses. Cardiovascular: Normal rate, regular rhythm and normal heart sounds. Pulmonary/Chest: Effort normal and breath sounds normal.   Lymphadenopathy:        Head (right side): No submental, no submandibular, no tonsillar, no preauricular, no posterior auricular and no occipital adenopathy present. Head (left side): No submental, no submandibular, no tonsillar, no preauricular, no posterior auricular and no occipital adenopathy present. He has no cervical adenopathy. Skin: Skin is warm and dry. ASSESSMENT/PLAN:  1. Acute diffuse otitis externa of right ear    - ciprofloxacin-dexamethasone (CIPRODEX) 0.3-0.1 % otic suspension; Place 4 drops into the left ear 2 times daily for 7 days  Dispense: 7.5 mL; Refill: 0    2.  Needs flu shot    - Influenza, High Dose, 65 yrs  and older, IM, PF, Prefill Syr,

## 2019-05-10 ENCOUNTER — TELEPHONE (OUTPATIENT)
Dept: INTERNAL MEDICINE CLINIC | Age: 84
End: 2019-05-10

## 2019-05-10 DIAGNOSIS — E21.3 HYPERPARATHYROIDISM (HCC): ICD-10-CM

## 2019-05-10 DIAGNOSIS — E78.2 MIXED HYPERLIPIDEMIA: Primary | ICD-10-CM

## 2019-05-15 DIAGNOSIS — Z13.1 SCREENING FOR DIABETES MELLITUS (DM): ICD-10-CM

## 2019-05-15 DIAGNOSIS — E78.2 MIXED HYPERLIPIDEMIA: ICD-10-CM

## 2019-05-15 DIAGNOSIS — E21.3 HYPERPARATHYROIDISM (HCC): ICD-10-CM

## 2019-05-15 LAB
A/G RATIO: 1.7 (ref 1.1–2.2)
ALBUMIN SERPL-MCNC: 4 G/DL (ref 3.4–5)
ALP BLD-CCNC: 63 U/L (ref 40–129)
ALT SERPL-CCNC: 17 U/L (ref 10–40)
ANION GAP SERPL CALCULATED.3IONS-SCNC: 10 MMOL/L (ref 3–16)
AST SERPL-CCNC: 21 U/L (ref 15–37)
BASOPHILS ABSOLUTE: 0 K/UL (ref 0–0.2)
BASOPHILS RELATIVE PERCENT: 0.6 %
BILIRUB SERPL-MCNC: 1.6 MG/DL (ref 0–1)
BUN BLDV-MCNC: 22 MG/DL (ref 7–20)
CALCIUM SERPL-MCNC: 9.1 MG/DL (ref 8.3–10.6)
CHLORIDE BLD-SCNC: 107 MMOL/L (ref 99–110)
CHOLESTEROL, FASTING: 131 MG/DL (ref 0–199)
CO2: 29 MMOL/L (ref 21–32)
CREAT SERPL-MCNC: 1 MG/DL (ref 0.8–1.3)
EOSINOPHILS ABSOLUTE: 0.1 K/UL (ref 0–0.6)
EOSINOPHILS RELATIVE PERCENT: 2.8 %
GFR AFRICAN AMERICAN: >60
GFR NON-AFRICAN AMERICAN: >60
GLOBULIN: 2.4 G/DL
GLUCOSE BLD-MCNC: 99 MG/DL (ref 70–99)
GLUCOSE FASTING: 99 MG/DL (ref 70–99)
HCT VFR BLD CALC: 40.7 % (ref 40.5–52.5)
HDLC SERPL-MCNC: 47 MG/DL (ref 40–60)
HEMOGLOBIN: 14.1 G/DL (ref 13.5–17.5)
LDL CHOLESTEROL CALCULATED: 71 MG/DL
LYMPHOCYTES ABSOLUTE: 0.7 K/UL (ref 1–5.1)
LYMPHOCYTES RELATIVE PERCENT: 12.9 %
MCH RBC QN AUTO: 35.3 PG (ref 26–34)
MCHC RBC AUTO-ENTMCNC: 34.6 G/DL (ref 31–36)
MCV RBC AUTO: 101.8 FL (ref 80–100)
MONOCYTES ABSOLUTE: 0.4 K/UL (ref 0–1.3)
MONOCYTES RELATIVE PERCENT: 7.7 %
NEUTROPHILS ABSOLUTE: 3.8 K/UL (ref 1.7–7.7)
NEUTROPHILS RELATIVE PERCENT: 76 %
PDW BLD-RTO: 16.2 % (ref 12.4–15.4)
PLATELET # BLD: 187 K/UL (ref 135–450)
PMV BLD AUTO: 8.7 FL (ref 5–10.5)
POTASSIUM SERPL-SCNC: 4.5 MMOL/L (ref 3.5–5.1)
PROSTATE SPECIFIC ANTIGEN: 10.45 NG/ML (ref 0–4)
RBC # BLD: 4 M/UL (ref 4.2–5.9)
SODIUM BLD-SCNC: 146 MMOL/L (ref 136–145)
TOTAL PROTEIN: 6.4 G/DL (ref 6.4–8.2)
TRIGLYCERIDE, FASTING: 67 MG/DL (ref 0–150)
TSH REFLEX: 3.25 UIU/ML (ref 0.27–4.2)
VITAMIN D 25-HYDROXY: 38.8 NG/ML
VLDLC SERPL CALC-MCNC: 13 MG/DL
WBC # BLD: 5.1 K/UL (ref 4–11)

## 2019-05-21 ENCOUNTER — OFFICE VISIT (OUTPATIENT)
Dept: INTERNAL MEDICINE CLINIC | Age: 84
End: 2019-05-21
Payer: MEDICARE

## 2019-05-21 VITALS
HEIGHT: 70 IN | SYSTOLIC BLOOD PRESSURE: 132 MMHG | BODY MASS INDEX: 30.39 KG/M2 | DIASTOLIC BLOOD PRESSURE: 72 MMHG | OXYGEN SATURATION: 98 % | HEART RATE: 80 BPM | WEIGHT: 212.3 LBS

## 2019-05-21 DIAGNOSIS — N40.0 BENIGN NON-NODULAR PROSTATIC HYPERPLASIA WITHOUT LOWER URINARY TRACT SYMPTOMS: ICD-10-CM

## 2019-05-21 DIAGNOSIS — E78.2 MIXED HYPERLIPIDEMIA: Primary | ICD-10-CM

## 2019-05-21 DIAGNOSIS — Z00.00 ROUTINE GENERAL MEDICAL EXAMINATION AT A HEALTH CARE FACILITY: ICD-10-CM

## 2019-05-21 PROBLEM — Z51.89 VISIT FOR WOUND CHECK: Status: RESOLVED | Noted: 2018-08-29 | Resolved: 2019-05-21

## 2019-05-21 LAB
CONTROL: NORMAL
HEMOCCULT STL QL: NEGATIVE

## 2019-05-21 PROCEDURE — 90715 TDAP VACCINE 7 YRS/> IM: CPT | Performed by: INTERNAL MEDICINE

## 2019-05-21 PROCEDURE — 93000 ELECTROCARDIOGRAM COMPLETE: CPT | Performed by: INTERNAL MEDICINE

## 2019-05-21 PROCEDURE — 82274 ASSAY TEST FOR BLOOD FECAL: CPT | Performed by: INTERNAL MEDICINE

## 2019-05-21 PROCEDURE — 90471 IMMUNIZATION ADMIN: CPT | Performed by: INTERNAL MEDICINE

## 2019-05-21 PROCEDURE — 1123F ACP DISCUSS/DSCN MKR DOCD: CPT | Performed by: INTERNAL MEDICINE

## 2019-05-21 PROCEDURE — G0439 PPPS, SUBSEQ VISIT: HCPCS | Performed by: INTERNAL MEDICINE

## 2019-05-21 PROCEDURE — G8599 NO ASA/ANTIPLAT THER USE RNG: HCPCS | Performed by: INTERNAL MEDICINE

## 2019-05-21 ASSESSMENT — ENCOUNTER SYMPTOMS
EYE DISCHARGE: 0
VOICE CHANGE: 0
ABDOMINAL DISTENTION: 0
FACIAL SWELLING: 0
RHINORRHEA: 0
TROUBLE SWALLOWING: 0
EYE ITCHING: 0
COLOR CHANGE: 0
CONSTIPATION: 0
COUGH: 0
CHEST TIGHTNESS: 0
ANAL BLEEDING: 0
SINUS PRESSURE: 0
BLOOD IN STOOL: 0
EYE PAIN: 0
VOMITING: 0
APNEA: 0
SHORTNESS OF BREATH: 0
RECTAL PAIN: 0
STRIDOR: 0
NAUSEA: 0
WHEEZING: 0
ABDOMINAL PAIN: 0
DIARRHEA: 0
PHOTOPHOBIA: 0
EYE REDNESS: 0
CHOKING: 0
SORE THROAT: 0
BACK PAIN: 0

## 2019-05-21 ASSESSMENT — PATIENT HEALTH QUESTIONNAIRE - PHQ9
SUM OF ALL RESPONSES TO PHQ QUESTIONS 1-9: 0
SUM OF ALL RESPONSES TO PHQ QUESTIONS 1-9: 0

## 2019-05-21 ASSESSMENT — ANXIETY QUESTIONNAIRES: GAD7 TOTAL SCORE: 0

## 2019-05-21 ASSESSMENT — LIFESTYLE VARIABLES: HOW OFTEN DO YOU HAVE A DRINK CONTAINING ALCOHOL: 0

## 2019-05-21 NOTE — PATIENT INSTRUCTIONS
Personalized Preventive Plan for Vianca Rubi - 5/21/2019  Medicare offers a range of preventive health benefits. Some of the tests and screenings are paid in full while other may be subject to a deductible, co-insurance, and/or copay. Some of these benefits include a comprehensive review of your medical history including lifestyle, illnesses that may run in your family, and various assessments and screenings as appropriate. After reviewing your medical record and screening and assessments performed today your provider may have ordered immunizations, labs, imaging, and/or referrals for you. A list of these orders (if applicable) as well as your Preventive Care list are included within your After Visit Summary for your review. Other Preventive Recommendations:    · A preventive eye exam performed by an eye specialist is recommended every 1-2 years to screen for glaucoma; cataracts, macular degeneration, and other eye disorders. · A preventive dental visit is recommended every 6 months. · Try to get at least 150 minutes of exercise per week or 10,000 steps per day on a pedometer . · Order or download the FREE \"Exercise & Physical Activity: Your Everyday Guide\" from [employer]. Call 8-776.960.4447 or search [employer] online. · You need 7785-0866 mg of calcium and 4059-9714 IU of vitamin D per day. It is possible to meet your calcium requirement with diet alone, but a vitamin D supplement is usually necessary to meet this goal.  · When exposed to the sun, use a sunscreen that protects against both UVA and UVB radiation with an SPF of 30 or greater. Reapply every 2 to 3 hours or after sweating, drying off with a towel, or swimming. · Always wear a seat belt when traveling in a car. Always wear a helmet when riding a bicycle or motorcycle. Heart-Healthy Diet   Sodium, Fat, and Cholesterol Controlled Diet       What Is a Heart Healthy Diet?    A heart-healthy diet is one that limits sodium , certain types of fat , and cholesterol . This type of diet is recommended for:   People with any form of cardiovascular disease (eg, coronary heart disease , peripheral vascular disease , previous heart attack , previous stroke )   People with risk factors for cardiovascular disease, such as high blood pressure , high cholesterol , or diabetes   Anyone who wants to lower their risk of developing cardiovascular disease   Sodium    Sodium is a mineral found in many foods. In general, most people consume much more sodium than they need. Diets high in sodium can increase blood pressure and lead to edema (water retention). On a heart-healthy diet, you should consume no more than 2,300 mg (milligrams) of sodium per dayabout the amount in one teaspoon of table salt. The foods highest in sodium include table salt (about 50% sodium), processed foods, convenience foods, and preserved foods. Cholesterol    Cholesterol is a fat-like, waxy substance in your blood. Our bodies make some cholesterol. It is also found in animal products, with the highest amounts in fatty meat, egg yolks, whole milk, cheese, shellfish, and organ meats. On a heart-healthy diet, you should limit your cholesterol intake to less than 200 mg per day. It is normal and important to have some cholesterol in your bloodstream. But too much cholesterol can cause plaque to build up within your arteries, which can eventually lead to a heart attack or stroke. The two types of cholesterol that are most commonly referred to are:   Low-density lipoprotein (LDL) cholesterol  Also known as bad cholesterol, this is the cholesterol that tends to build up along your arteries. Bad cholesterol levels are increased by eating fats that are saturated or hydrogenated. Optimal level of this cholesterol is less than 100. Over 130 starts to get risky for heart disease.    High-density lipoprotein (HDL) cholesterol  Also known as good cholesterol, this type of cholesterol actually carries cholesterol away from your arteries and may, therefore, help lower your risk of having a heart attack. You want this level to be high (ideally greater than 60). It is a risk to have a level less than 40. You can raise this good cholesterol by eating olive oil, canola oil, avocados, or nuts. Exercise raises this level, too. Fat    Fat is calorie dense and packs a lot of calories into a small amount of food. Even though fats should be limited due to their high calorie content, not all fats are bad. In fact, some fats are quite healthful. Fat can be broken down into four main types. The good-for-you fats are:   Monounsaturated fat  found in oils such as olive and canola, avocados, and nuts and natural nut butters; can decrease cholesterol levels, while keeping levels of HDL cholesterol high   Polyunsaturated fat  found in oils such as safflower, sunflower, soybean, corn, and sesame; can decrease total cholesterol and LDL cholesterol   Omega-3 fatty acids  particularly those found in fatty fish (such as salmon, trout, tuna, mackerel, herring, and sardines); can decrease risk of arrhythmias, decrease triglyceride levels, and slightly lower blood pressure   The fats that you want to limit are:   Saturated fat  found in animal products, many fast foods, and a few vegetables; increases total blood cholesterol, including LDL levels   Animal fats that are saturated include: butter, lard, whole-milk dairy products, meat fat, and poultry skin   Vegetable fats that are saturated include: hydrogenated shortening, palm oil, coconut oil, cocoa butter   Hydrogenated or trans fat  found in margarine and vegetable shortening, most shelf stable snack foods, and fried foods; increases LDL and decreases HDL     It is generally recommended that you limit your total fat for the day to less than 30% of your total calories.  If you follow an 1800-calorie heart healthy diet, for week) Tofu Nuts or seeds (unsalted, dry-roasted), low-sodium peanut butter Dried peas, beans, and lentils   Any smoked, cured, salted, or canned meat, fish, or poultry (including mtz, chipped beef, cold cuts, hot dogs, sausages, sardines, and anchovies) Poultry skins Breaded and/or fried fish or meats Canned peas, beans, and lentils Salted nuts   Fats and Oils   Olive oil and canola oil Low-sodium, low-fat salad dressings and mayonnaise   Butter, margarine, coconut and palm oils, mtz fat   Snacks, Sweets, and Condiments   Low-sodium or unsalted versions of broths, soups, soy sauce, and condiments Pepper, herbs, and spices; vinegar, lemon, or lime juice Low-fat frozen desserts (yogurt, sherbet, fruit bars) Sugar, cocoa powder, honey, syrup, jam, and preserves Low-fat, trans-fat free cookies, cakes, and pies Jacques and animal crackers, fig bars, filippo snaps   High-fat desserts Broth, soups, gravies, and sauces, made from instant mixes or other high-sodium ingredients Salted snack foods Canned olives Meat tenderizers, seasoning salt, and most flavored vinegars   Beverages   Low-sodium carbonated beverages Tea and coffee in moderation Soy milk   Commercially softened water   Suggestions   Make whole grains, fruits, and vegetables the base of your diet. Choose heart-healthy fats such as canola, olive, and flaxseed oil, and foods high in heart-healthy fats, such as nuts, seeds, soybeans, tofu, and fish. Eat fish at least twice per week; the fish highest in omega-3 fatty acids and lowest in mercury include salmon, herring, mackerel, sardines, and canned chunk light tuna. If you eat fish less than twice per week or have high triglycerides, talk to your doctor about taking fish oil supplements. Read food labels.    For products low in fat and cholesterol, look for fat free, low-fat, cholesterol free, saturated fat free, and trans fat freeAlso scan the Nutrition Facts Label, which lists saturated fat, trans fat, and cholesterol amounts. For products low in sodium, look for sodium free, very low sodium, low sodium, no added salt, and unsalted   Skip the salt when cooking or at the table; if food needs more flavor, get creative and try out different herbs and spices. Garlic and onion also add substantial flavor to foods. Trim any visible fat off meat and poultry before cooking, and drain the fat off after thibodeaux. Use cooking methods that require little or no added fat, such as grilling, boiling, baking, poaching, broiling, roasting, steaming, stir-frying, and sauting. Avoid fast food and convenience food. They tend to be high in saturated and trans fat and have a lot of added salt. Talk to a registered dietitian for individualized diet advice.       Last Reviewed: March 2011 Annel Epstein MS, MPH, RD   Updated: 3/29/2011   ·

## 2019-05-21 NOTE — PROGRESS NOTES
Subjective:      Patient ID: Idalia Villatoro is a 80 y.o. male. Chief Complaint   Patient presents with    Medicare AWV       HPI  Patient is here for AWV. Review of Systems   Constitutional: Negative for activity change, appetite change, chills, diaphoresis, fatigue, fever and unexpected weight change. HENT: Negative for congestion, dental problem, drooling, ear discharge, ear pain, facial swelling, hearing loss, mouth sores, nosebleeds, postnasal drip, rhinorrhea, sinus pressure, sneezing, sore throat, tinnitus, trouble swallowing and voice change. Eyes: Negative for photophobia, pain, discharge, redness, itching and visual disturbance. Respiratory: Negative for apnea, cough, choking, chest tightness, shortness of breath, wheezing and stridor. Cardiovascular: Negative for chest pain, palpitations and leg swelling. Gastrointestinal: Negative for abdominal distention, abdominal pain, anal bleeding, blood in stool, constipation, diarrhea, nausea, rectal pain and vomiting. Genitourinary: Positive for frequency. Negative for decreased urine volume, difficulty urinating, discharge, dysuria, enuresis, flank pain, genital sores, hematuria, penile pain, penile swelling, scrotal swelling, testicular pain and urgency. Musculoskeletal: Negative for arthralgias, back pain, gait problem, joint swelling, myalgias, neck pain and neck stiffness. Skin: Negative for color change, pallor, rash and wound. Neurological: Negative for dizziness, tremors, seizures, syncope, facial asymmetry, speech difficulty, weakness, light-headedness, numbness and headaches. Hematological: Negative for adenopathy. Does not bruise/bleed easily. Objective:   Physical Exam   Constitutional: He is oriented to person, place, and time. He appears well-developed and well-nourished. HENT:   Head: Normocephalic and atraumatic.    Right Ear: Tympanic membrane and ear canal normal.   Left Ear: Tympanic membrane and ear canal normal.   Nose: Nose normal.   Mouth/Throat: Uvula is midline, oropharynx is clear and moist and mucous membranes are normal.   Eyes: Pupils are equal, round, and reactive to light. EOM and lids are normal.   Fundi exam is normal   Neck: Trachea normal. No JVD present. Carotid bruit is not present. No edema present. No thyroid mass and no thyromegaly present. Cardiovascular: Normal rate, regular rhythm, S1 normal, S2 normal and intact distal pulses. No murmur heard. Pulmonary/Chest: Effort normal and breath sounds normal.   Abdominal: Soft. Normal appearance. There is no tenderness. No hernia. Hernia confirmed negative in the right inguinal area and confirmed negative in the left inguinal area. Genitourinary: Rectum normal, testes normal and penis normal. Rectal exam shows guaiac negative stool. Prostate is enlarged. Genitourinary Comments: Instructed on monthly self testicle exam   Musculoskeletal: Normal range of motion. Neurological: He is alert and oriented to person, place, and time. He has normal strength and normal reflexes. No cranial nerve deficit or sensory deficit. Skin: Skin is warm and dry. Patient advised to do a monthly mole check         Assessment:      See Problem List assessment and plan       Plan:       Routine general medical examination at a health care facility  Reviewed diet and exercise  Check labs  Reviewed immunizations  Reviewed colonoscopy          Quality & Risk Score Accuracy    Last edited 19 08:38 EDT by Yennifer Foster MD           Patient engaged in shared decision making. Information given to evaluate options of treatment, understand what is needed and discuss importance of following plan. Medicare Annual Wellness Visit  Name: Sultana Mealing Date: 2019   MRN: [id number] Sex: Male   Age: 80 y.o.  Ethnicity: Non-/Non    : 1934 Race: Alexey Ulrich is here for Medicare AWV    Screenings for behavioral, psychosocial and functional/safety risks, and cognitive dysfunction are all negative except as indicated below. These results, as well as other patient data from the [address] [facility] form, are documented in Flowsheets linked to this Encounter. Allergies   Allergen Reactions    Iodine      **IVP DYES**       Prior to Visit Medications    Medication Sig Taking? Authorizing Provider   Vitamin D (CHOLECALCIFEROL) 1000 UNITS CAPS capsule Take 1 capsule by mouth daily Yes Lazarus Idol, MD   sildenafil (VIAGRA) 100 MG tablet Take 1 tablet by mouth as needed for Erectile Dysfunction Yes Lazarus Idol, MD   simvastatin (ZOCOR) 20 MG tablet TAKE 1 TABLET IN THE EVENING Yes Lazarus Idol, MD   latanoprost (XALATAN) 0.005 % ophthalmic solution Place 1 drop into both eyes nightly. Yes Historical Provider, MD   metoprolol (TOPROL XL) 25 MG XL tablet Take 1 tablet by mouth daily. Yes Lazarus Idol, MD   aspirin EC 81 MG EC tablet Take 1 tablet by mouth daily. Yes Lazarus Idol, MD   mercaptopurine (PURINETHOL) 50 MG tablet Take 50 mg by mouth daily.  Yes Historical Provider, MD       Past Medical History:   Diagnosis Date    Aortic regurgitation     BPH     Hyperlipidemia     Hypertension     Superficial phlebitis     Ulcerative colitis      Past Surgical History:   Procedure Laterality Date    COLONOSCOPY  10/2011    COLONOSCOPY  09/20/2013    COLONOSCOPY  10/16/2015    HERNIA REPAIR      inguinal    MOHS SURGERY  07/25/2018    Left lower lip    PARATHYROIDECTOMY      SALIVARY GLAND SURGERY      parotid       Family History   Problem Relation Age of Onset    Other Mother         'intestine shut down'    Kidney Disease Father        CareTeam (Including outside providers/suppliers regularly involved in providing care):   Patient Care Team:  Lazarus Idol, MD as PCP - General (Internal Medicine)  LIZZ Bowling CNP as PCP - [facility] Attributed Provider    Wt Readings from Last 3 Encounters:   05/21/19 212 lb 4.8 oz (96.3 kg)   09/10/18 215 lb (97.5 kg)   08/08/18 211 lb (95.7 kg)     Vitals:    05/21/19 0759   BP: 132/72   Site: Left Upper Arm   Position: Sitting   Cuff Size: Medium Adult   Pulse: 80   SpO2: 98%   Weight: 212 lb 4.8 oz (96.3 kg)   Height: 5' 10\" (1.778 m)     Body mass index is 30.46 kg/m². Based upon direct observation of the patient, evaluation of cognition reveals recent and remote memory intact. Patient's complete Health Risk Assessment and screening values have been reviewed and are found in Flowsheets. The following problems were reviewed today and where indicated follow up appointments were made and/or referrals ordered. Positive Risk Factor Screenings with Interventions:     Substance Abuse:  Social History     Tobacco History     Smoking Status  Current Some Day Smoker Smoking Frequency  For 38 years Smoking Tobacco Type  Cigars    Smokeless Tobacco Use  Never Used          Alcohol History     Alcohol Use Status  Yes Drinks/Week  0 Standard drinks or equivalent per week Amount  0.0 oz alcohol/wk Comment  socially          Drug Use     Drug Use Status  No          Sexual Activity     Sexually Active  Not Asked               Audit Questionnaire: Screen for Alcohol Misuse  How often do you have a drink containing alcohol?: Never  Substance Abuse Interventions:  · Tobacco abuse:  tobacco cessation tips and resources provided    Health Habits/Nutrition:  Health Habits/Nutrition  Do you exercise for at least 20 minutes 2-3 times per week?: Yes  Have you lost any weight without trying in the past 3 months?: No  Do you eat fewer than 2 meals per day?: No  Have you seen a dentist within the past year?: Yes  Body mass index is 30.46 kg/m².   Health Habits/Nutrition Interventions:  · Nutritional issues:  educational materials to promote weight loss provided    Personalized Preventive Plan   Current Health Maintenance Status  Immunization History   Administered Date(s) Administered    Influenza, High Dose (Fluzone 65 yrs and older) 10/19/2010, 10/05/2011, 10/16/2012, 10/29/2014, 11/03/2015, 11/11/2016, 11/07/2017, 09/10/2018    Pneumococcal 13-valent Conjugate ([id number]) 11/03/2015    Pneumococcal Conjugate 7-valent 11/01/2011    Pneumococcal Polysaccharide ([id number]) 11/07/2017    Td, unspecified formulation 06/25/2012        Health Maintenance   Topic Date Due    DTaP/Tdap/Td vaccine (1 - Tdap) 06/26/2012    Shingles Vaccine (1 of 2) 05/21/2020 (Originally 7/4/1984)    Flu vaccine  Completed    Pneumococcal 65+ years Vaccine  Completed     Recommendations for Preventive Services Due: see orders and patient instructions/AVS.  .   Recommended screening schedule for the next 5-10 years is provided to the patient in written form: see Patient Instructions/AVS.

## 2019-05-22 ENCOUNTER — PROCEDURE VISIT (OUTPATIENT)
Dept: SURGERY | Age: 84
End: 2019-05-22
Payer: MEDICARE

## 2019-05-22 VITALS
BODY MASS INDEX: 30.42 KG/M2 | DIASTOLIC BLOOD PRESSURE: 93 MMHG | HEART RATE: 66 BPM | SYSTOLIC BLOOD PRESSURE: 177 MMHG | OXYGEN SATURATION: 92 % | WEIGHT: 212 LBS | TEMPERATURE: 98.7 F

## 2019-05-22 DIAGNOSIS — C44.319 BASAL CELL CARCINOMA OF LEFT CHEEK: Primary | ICD-10-CM

## 2019-05-22 PROCEDURE — 17311 MOHS 1 STAGE H/N/HF/G: CPT | Performed by: DERMATOLOGY

## 2019-05-22 PROCEDURE — 17312 MOHS ADDL STAGE: CPT | Performed by: DERMATOLOGY

## 2019-05-22 PROCEDURE — 13132 CMPLX RPR F/C/C/M/N/AX/G/H/F: CPT | Performed by: DERMATOLOGY

## 2019-05-22 NOTE — PROGRESS NOTES
PRE-PROCEDURE SCREENING    Pacemaker/ICD: No  Difficulty with numbing in the past: No  Local Anesthesia Reaction/passing out: No  Latex or adhesive allergy:  No  Bleeding/Clotting Disorders: Yes and No  Anticoagulant Therapy: Yes, ASA  Joint prosthesis: No  Artificial Heart Valve: No  Stroke or Seizures: No  Organ Transplant or Lymphoma: No  Immunosuppression: No  Respiratory Problems: No

## 2019-05-22 NOTE — PATIENT INSTRUCTIONS
[facility] Office Hours:    Monday-Thursday  7:30 AM-4:30 PM    Friday  9:00 AM-3:00 PM          POST-OPERATIVE CARE FOR STICHES              Bandage change after 48 hours    CARING FOR YOUR SURGICAL SITE  The bandage should remain on and completley dry for 48 hours. Do NOT get the bandage wet. 1. After the first 48 hours, gently remove the remaining part of the bandage. It can be helpful to moisten the bandage edges in the shower. Steri strips may still be on the wound. It is ok, they will fall off slowly with the daily bandage changes. 2. Gently clean the wound daily with mild soap and water. Try to clean off crust and debris. 3. Dry (pat) the area with a clean Q-tip or gauze. 4. Apply a layer of Vaseline/ Aquaphor (or Bacitracin if your doctor recommends) to the wound area only. 5. Cut a piece of Telfa (or any non-stick dressing) to fit just over the wound and secure it with paper tape. If the wound is small you may use a Band- Aid. Keep area covered for a total of 1 week(s). If the dressing comes off or if you have questions, or concerns about the dressing, please call the office for instructions! POST OPERATIVE INSTRUCTIONS    1. Activity: Do not lift anything heavier than a gallon of milk for 1 week. Also, avoid strenuous activity such as running, power walking or contact sports. 2. Eating and drinking: Do not drink alcohol for 48 hours after your procedure. Alcohol increases the chances of bleeding. 3. Medicines   -If you have discomfort, take Acetaminophen (Tylenol or Extra Strength Tylenol). Follow the instructions and warning on the bottle. -If your doctor has prescribed you an Aspirin daily, please keep taking it. Do not take extra Aspirin or medicines containing Aspirin (such as [place] and Excedrin) for 48 hours  after your procedure. Bleeding: If bleeding occurs, DO NOT remove the bandage.  Put firm pressure on the area with gauze for 20 minutes without peeking. If the bleeding continue, apply pressure for another 20 minutes. If the bleeding does not stop after you apply pressure, call us right away. If you can not call, go to the nearest emergency room or urgent care facility. What to expect:  You may have these symptoms. They are normal and should get better with time:  1. Swelling. Swelling usually increases for the first 48 hours after your procedure and then begins to improve. Some soreness and redness around your wound. If we worked close to you eyes  (forehead, nose, temple, or upper cheeks) your eyes may become swollen and/ or black and blue. 2. Bruising, which could last 1 week or more. 3. Pink and bumpy appearance to the scar. This may happen a few weeks after your procedure. After 4 weeks, you may gently massage the area each day with facial moisturizer or petroleum jelly (Vaseline or Aquaphor). This will help to smooth the skin and improve the appearance of the scar. The color of your scar will fade over time, but may be pink for several months after the procedure. The scar may take 6 months to 1 year to reach its final color and appearance. 4. \"Spitting\" suture. Occasionally, an inside suture (stitch) does not completely dissolve. When this happens, (generally 4-8 weeks after surgery), it causes a bump or \"pimple\" to form on the scar. This is easily removed and is not at all serious. It does not mean the skin cancer has returned. Contact us if it happens, but do not be alarmed. Vitamin E oil is NOT necessary. A good moisturizer is just as effective. Sunscreen IS necessary.  Use at least and SPF 30 sunscreen daily- even in winter    Call us at [phone] right away if you have any of the following symptoms:  -Bleeding that you can not stop (see highlighted area above)   -Pain that lasts longer than 48 hours  -Your wound becomes  more painful, red or hot  -Bruising and swelling that does not begin to improve within the 48 hours or gets worse suddenly.

## 2019-05-22 NOTE — PROGRESS NOTES
MOHS PROCEDURE NOTE    PHYSICIAN:  Liv Garza. Ginger Knox MD    ASSISTANT: Hunter Gagnon LPN       REFERRING PROVIDER:  Maria Antonia Perez MD, Ph.D      PREOPERATIVE DIAGNOSIS: Nodular Basal Cell Carcinoma and Infiltrative Basal Cell Carcinoma     SPECIFIC MOHS INDICATIONS:  size, location and aggressive histologic growth pattern    AUC SCORIN/9    POSTOPERATIVE DIAGNOSIS: SAME    LOCATION: left cheek    OPERATIVE PROCEDURE:  MOHS MICROGRAPHIC SURGERY    RECONSTRUCTION OF DEFECT: Complex layered closure    PREOPERATIVE SIZE: 24x18 MM    DEFECT SIZE: 38x29 MM    LENGTH OF REPAIRED WOUND/SIZE OF FLAP/SIZE OF GRAFT:  73 MM    ANESTHESIA:  18mL 1% lidocaine with epinephrine 1:100,000 buffered. EBL:  MINIMAL    DURATION OF PROCEDURE:  3 HOURS    POSTOPERATIVE OBSERVATION: 0.5 HOUR    SPECIMENS:  SEE MOHS MAP    COMPLICATIONS:  NONE    DESCRIPTION OF PROCEDURE:  The patient was given a mirror, as appropriate, and the biopsy site was identified, marked with a surgical marking pen, and verified by the patient. Options for treatment were discussed and the patient was informed that Mohs surgery was the selected treatment based on its lower recurrence rate, given the features listed above, as compared to other treatment modalities such as excision, radiation, or curettage, and agreed with this treatment plan. Risks and benefits including bruising, swelling, bleeding, infection, nerve injury, recurrence, and scarring were discussed with the patient prior to the procedure and a written consent detailing these and other risks was reviewed with the patient and signed. There was a time out for person and procedure verification. The surgical site was prepped with an antiseptic solution. Application of an antiseptic solution was repeated before each surgical stage. Stage I:  The clinically-apparent tumor was carefully defined and debulked, determining the edge of the surgical excision.     A thin layer of tumor-laden tissue was excised with a narrow margin of normal-appearing skin, using the technique of Mohs. A map was prepared to correspond to the area of skin from which it was excised. Hemostasis was achieved using electrosurgery. The wound was bandaged. The tissue was prepared for the cryostat and sectioned. 1 section(s) prepared. Each section was coded, cut, and stained for microscopic examination. The entire base and margins of the excised piece of tissue were examined by the surgeon. Stage I, II:  Nodular BCC: large basaloid lobules of varying shape and size with peripheral palisading present around the rim of the lobule, with retraction of the tumor lobules from their associated stroma. The remaining tumor was noted and the next stage was performed. Stage II, III :  A thin layer of tissue was removed at the histologically-identified sites of remaining tumor. The entire procedure as described in stage I was repeated to process the tissue according to Mohs technique. 1 section(s) prepared for stage II and III. No tumor was identified at the peripheral margins of stage III of microscopically controlled surgery. DEFECT MANAGEMENT:    REPAIR DESCRIPTION:  Various closure modalities were discussed with the patient, and it was decided that a complex repair would best preserve normal anatomic and functional relationships. Additional risk of wound dehiscence was discussed. The patient was placed on the procedure room table. The area was anesthetized with 1% lidocaine with epinephrine 1:100,000 buffered, was given a sterile prep using Chlorhexidine gluconate 4% solution, and draped in the usual sterile fashion. Recreation and enlargement of the wound was performed by excising cones of tissue via the triangulation technique.     The final incision lines were placed with respect for the patient's natural skin tension lines in a linear configuration to avoid functional and aesthetic distortion of adjacent free margins. Following extensive undermining, meticulous hemostasis was obtained with spot monopolar electrocoagulation. Subcutaneous dead space and dermis were closed using 4-0/5-0 Vicryl buried subcutaneous interrupted suture and the epidermis was approximated with 6-0 Ethilon using running epidermal sutures. WOUND COVERAGE:  The wound was cleaned with normal saline solution, dried off, Aquaphor ointment was applied, and the wound was covered. A dressing was applied for stabilization and light pressure. The patient was given detailed oral and written instructions on postoperative care. There were no complications. The patient left the Unit in good medical condition. FOLLOW-UP:  The patient will return for suture removal in 7 days.

## 2019-05-23 ENCOUNTER — TELEPHONE (OUTPATIENT)
Dept: SURGERY | Age: 84
End: 2019-05-23

## 2019-05-23 NOTE — TELEPHONE ENCOUNTER
The patient was in the office on 5/22/19 for mohs surgery located on the left cheek with CLC repair. The patient tolerated the procedure well and left the office in good condition. A post-operative telephone call was placed at [phone] in order to check on the patient's recovery process. The patient reported doing well and had no complaints. All of the patient's questions were answered.

## 2019-05-29 ENCOUNTER — PROCEDURE VISIT (OUTPATIENT)
Dept: SURGERY | Age: 84
End: 2019-05-29
Payer: MEDICARE

## 2019-05-29 VITALS
WEIGHT: 211 LBS | SYSTOLIC BLOOD PRESSURE: 143 MMHG | TEMPERATURE: 97.3 F | OXYGEN SATURATION: 98 % | HEART RATE: 69 BPM | BODY MASS INDEX: 30.28 KG/M2 | DIASTOLIC BLOOD PRESSURE: 85 MMHG

## 2019-05-29 DIAGNOSIS — Z48.02 VISIT FOR SUTURE REMOVAL: ICD-10-CM

## 2019-05-29 DIAGNOSIS — C44.319 BASAL CELL CARCINOMA OF RIGHT CHEEK: Primary | ICD-10-CM

## 2019-05-29 PROCEDURE — 13132 CMPLX RPR F/C/C/M/N/AX/G/H/F: CPT | Performed by: DERMATOLOGY

## 2019-05-29 PROCEDURE — 17311 MOHS 1 STAGE H/N/HF/G: CPT | Performed by: DERMATOLOGY

## 2019-05-29 PROCEDURE — 17312 MOHS ADDL STAGE: CPT | Performed by: DERMATOLOGY

## 2019-05-29 NOTE — PROGRESS NOTES
S:  The patient is here for suture removal s/p Mohs surgery on the left cheek and Complex layered closure repair, 1 week(s) ago. The site appears well-healed without signs of infection (redness, pain or discharge). The sutures were removed. Daily Wound care and activity instructions given. The patient was scheduled for follow-up prn for scar/wound check. The patient was scheduled for f/u with General Dermatology per their instructions.

## 2019-05-29 NOTE — PATIENT INSTRUCTIONS
[facility] Office Hours:    Monday-Thursday  7:30 AM-4:30 PM    Friday  9:00 AM-3:00 PM          POST-OPERATIVE CARE FOR STICHES              Bandage change after 48 hours    CARING FOR YOUR SURGICAL SITE  The bandage should remain on and completley dry for 48 hours. Do NOT get the bandage wet. 1. After the first 48 hours, gently remove the remaining part of the bandage. It can be helpful to moisten the bandage edges in the shower. Steri strips may still be on the wound. It is ok, they will fall off slowly with the daily bandage changes. 2. Gently clean the wound daily with mild soap and water. Try to clean off crust and debris. 3. Dry (pat) the area with a clean Q-tip or gauze. 4. Apply a layer of Vaseline/ Aquaphor (or Bacitracin if your doctor recommends) to the wound area only. 5. Cut a piece of Telfa (or any non-stick dressing) to fit just over the wound and secure it with paper tape. If the wound is small you may use a Band- Aid. Keep area covered for a total of 1 week(s). If the dressing comes off or if you have questions, or concerns about the dressing, please call the office for instructions! POST OPERATIVE INSTRUCTIONS    1. Activity: Do not lift anything heavier than a gallon of milk for 1 week. Also, avoid strenuous activity such as running, power walking or contact sports. 2. Eating and drinking: Do not drink alcohol for 48 hours after your procedure. Alcohol increases the chances of bleeding. 3. Medicines   -If you have discomfort, take Acetaminophen (Tylenol or Extra Strength Tylenol). Follow the instructions and warning on the bottle. -If your doctor has prescribed you an Aspirin daily, please keep taking it. Do not take extra Aspirin or medicines containing Aspirin (such as Natalia-Carleton and Excedrin) for 48 hours  after your procedure. Bleeding: If bleeding occurs, DO NOT remove the bandage.  Put firm pressure on the area with gauze for 20 minutes without peeking. If the bleeding continue, apply pressure for another 20 minutes. If the bleeding does not stop after you apply pressure, call us right away. If you can not call, go to the nearest emergency room or urgent care facility. What to expect:  You may have these symptoms. They are normal and should get better with time:  1. Swelling. Swelling usually increases for the first 48 hours after your procedure and then begins to improve. Some soreness and redness around your wound. If we worked close to you eyes  (forehead, nose, temple, or upper cheeks) your eyes may become swollen and/ or black and blue. 2. Bruising, which could last 1 week or more. 3. Pink and bumpy appearance to the scar. This may happen a few weeks after your procedure. After 4 weeks, you may gently massage the area each day with facial moisturizer or petroleum jelly (Vaseline or Aquaphor). This will help to smooth the skin and improve the appearance of the scar. The color of your scar will fade over time, but may be pink for several months after the procedure. The scar may take 6 months to 1 year to reach its final color and appearance. 4. \"Spitting\" suture. Occasionally, an inside suture (stitch) does not completely dissolve. When this happens, (generally 4-8 weeks after surgery), it causes a bump or \"pimple\" to form on the scar. This is easily removed and is not at all serious. It does not mean the skin cancer has returned. Contact us if it happens, but do not be alarmed. Vitamin E oil is NOT necessary. A good moisturizer is just as effective. Sunscreen IS necessary.  Use at least and SPF 30 sunscreen daily- even in winter    Call us at [phone] right away if you have any of the following symptoms:  -Bleeding that you can not stop (see highlighted area above)   -Pain that lasts longer than 48 hours  -Your wound becomes  more painful, red or hot  -Bruising and swelling that does not begin to improve within the 48 hours or gets worse suddenly.

## 2019-05-29 NOTE — PROGRESS NOTES
MOHS PROCEDURE NOTE    PHYSICIAN:  Enoch Goldsmith. Shantel Dove MD    ASSISTANT: eDnis Jules RN, Randell Paredes RN, and Dinorah ronquillo MA     REFERRING PROVIDER:  Marco Graham MD    PREOPERATIVE DIAGNOSIS: Basal cell carcinoma, nodular and micronodular     SPECIFIC MOHS INDICATIONS:  location and aggressive histologic growth pattern    AUC SCORIN/9    POSTOPERATIVE DIAGNOSIS: SAME    LOCATION: Right cheek    OPERATIVE PROCEDURE:  MOHS MICROGRAPHIC SURGERY    RECONSTRUCTION OF DEFECT: Complex layered closure    PREOPERATIVE SIZE: 14x11 MM    DEFECT SIZE: 35x23 MM    LENGTH OF REPAIRED WOUND/SIZE OF FLAP/SIZE OF GRAFT:  64 MM    ANESTHESIA:  23 mL 1% lidocaine with epinephrine 1:100,000 buffered. EBL:  MINIMAL    DURATION OF PROCEDURE:  3.75 HOURS    POSTOPERATIVE OBSERVATION: 1 HOUR    SPECIMENS:  SEE MOHS MAP    COMPLICATIONS:  NONE    DESCRIPTION OF PROCEDURE:  The patient was given a mirror, as appropriate, and the biopsy site was identified, marked with a surgical marking pen, and verified by the patient. Options for treatment were discussed and the patient was informed that Mohs surgery was the selected treatment based on its lower recurrence rate, given the features listed above, as compared to other treatment modalities such as excision, radiation, or curettage, and agreed with this treatment plan. Risks and benefits including bruising, swelling, bleeding, infection, nerve injury, recurrence, and scarring were discussed with the patient prior to the procedure and a written consent detailing these and other risks was reviewed with the patient and signed. There was a time out for person and procedure verification. The surgical site was prepped with an antiseptic solution. Application of an antiseptic solution was repeated before each surgical stage. Stage I:  The clinically-apparent tumor was carefully defined and debulked, determining the edge of the surgical excision.     A thin layer of tumor-laden tissue was excised with a narrow margin of normal-appearing skin, using the technique of Mohs. A map was prepared to correspond to the area of skin from which it was excised. Hemostasis was achieved using electrosurgery. The wound was bandaged. The tissue was prepared for the cryostat and sectioned. 1 section(s) prepared. Each section was coded, cut, and stained for microscopic examination. The entire base and margins of the excised piece of tissue were examined by the surgeon. Stage I, II, III, IV:  Nodular BCC: large basaloid lobules of varying shape and size with peripheral palisading present around the rim of the lobule, with retraction of the tumor lobules from their associated stroma. The remaining tumor was noted and the next stage was performed. Stage II, III, IV and V3 :  A thin layer of tissue was removed at the histologically-identified sites of remaining tumor. The entire procedure as described in stage I was repeated to process the tissue according to Mohs technique. 1 section(s) prepared for stage II, III, IV and V. No tumor was identified at the peripheral margins of stage V of microscopically controlled surgery. DEFECT MANAGEMENT:    REPAIR DESCRIPTION:  Various closure modalities were discussed with the patient, and it was decided that a complex repair would best preserve normal anatomic and functional relationships. Additional risk of wound dehiscence was discussed. The patient was placed on the procedure room table. The area was anesthetized with 1% lidocaine with epinephrine 1:100,000 buffered, was given a sterile prep using Chlorhexidine gluconate 4% solution, and draped in the usual sterile fashion. Recreation and enlargement of the wound was performed by excising cones of tissue via the triangulation technique.     The final incision lines were placed with respect for the patient's natural skin tension lines in a linear configuration to

## 2019-05-30 ENCOUNTER — TELEPHONE (OUTPATIENT)
Dept: SURGERY | Age: 84
End: 2019-05-30

## 2019-05-30 NOTE — TELEPHONE ENCOUNTER
The patient was in the office on 5/29/19 for Mohs located on the right cheek with CLC repair. The patient tolerated the procedure well and left the office in good condition. A post-operative telephone call was placed at 1:10 pm in order to check on the patient's recovery process. The patient reported doing well and had no complaints. All of the patient's questions were answered.

## 2019-05-31 LAB
A/G RATIO: 1.8 (ref 1.1–2.2)
ALBUMIN SERPL-MCNC: 4.2 G/DL (ref 3.4–5)
ALP BLD-CCNC: 77 U/L (ref 40–129)
ALT SERPL-CCNC: 16 U/L (ref 10–40)
ANION GAP SERPL CALCULATED.3IONS-SCNC: 14 MMOL/L (ref 3–16)
AST SERPL-CCNC: 20 U/L (ref 15–37)
BILIRUB SERPL-MCNC: 1.6 MG/DL (ref 0–1)
BUN BLDV-MCNC: 21 MG/DL (ref 7–20)
CALCIUM SERPL-MCNC: 9 MG/DL (ref 8.3–10.6)
CHLORIDE BLD-SCNC: 104 MMOL/L (ref 99–110)
CO2: 28 MMOL/L (ref 21–32)
CREAT SERPL-MCNC: 1.1 MG/DL (ref 0.8–1.3)
GFR AFRICAN AMERICAN: >60
GFR NON-AFRICAN AMERICAN: >60
GLOBULIN: 2.3 G/DL
GLUCOSE BLD-MCNC: 102 MG/DL (ref 70–99)
HCT VFR BLD CALC: 40.8 % (ref 40.5–52.5)
HEMOGLOBIN: 13.8 G/DL (ref 13.5–17.5)
MCH RBC QN AUTO: 34.3 PG (ref 26–34)
MCHC RBC AUTO-ENTMCNC: 33.8 G/DL (ref 31–36)
MCV RBC AUTO: 101.3 FL (ref 80–100)
PDW BLD-RTO: 16.5 % (ref 12.4–15.4)
PLATELET # BLD: 206 K/UL (ref 135–450)
PMV BLD AUTO: 9.7 FL (ref 5–10.5)
POTASSIUM SERPL-SCNC: 4.5 MMOL/L (ref 3.5–5.1)
RBC # BLD: 4.03 M/UL (ref 4.2–5.9)
SODIUM BLD-SCNC: 146 MMOL/L (ref 136–145)
TOTAL PROTEIN: 6.5 G/DL (ref 6.4–8.2)
WBC # BLD: 6.4 K/UL (ref 4–11)

## 2019-06-05 ENCOUNTER — OFFICE VISIT (OUTPATIENT)
Dept: SURGERY | Age: 84
End: 2019-06-05

## 2019-06-05 DIAGNOSIS — Z48.02 VISIT FOR SUTURE REMOVAL: Primary | ICD-10-CM

## 2019-06-05 PROCEDURE — 99024 POSTOP FOLLOW-UP VISIT: CPT | Performed by: DERMATOLOGY

## 2019-06-05 RX ORDER — CEPHALEXIN 500 MG/1
500 CAPSULE ORAL 3 TIMES DAILY
Qty: 15 CAPSULE | Refills: 0 | Status: SHIPPED | OUTPATIENT
Start: 2019-06-05 | End: 2019-06-12 | Stop reason: ALTCHOICE

## 2019-06-06 NOTE — PROGRESS NOTES
S:  The patient is here for suture removal s/p Mohs surgery on the right cheek and Complex layered closure repair, 1 week(s) ago. The site appears well-healed without signs of infection (redness, pain or discharge). The sutures were removed. There is a fluctuant nodule at the superior aspect of the scar that when expressed drained purulent fluid. In addition there is ecchymosis with firm area at inferior portion that when incised drained dark thick blood indicative of a small hematoma. Evacuated what was liquid but the remainder is organized so will have to wait for liquefication or reabsorption to occur. Keflex 500mg tid. Wound care and activity instructions given. The patient was scheduled for follow-up in 1 week for scar/wound check. The patient was scheduled for f/u with General Dermatology per their instructions.

## 2019-06-12 ENCOUNTER — OFFICE VISIT (OUTPATIENT)
Dept: SURGERY | Age: 84
End: 2019-06-12

## 2019-06-12 DIAGNOSIS — Z51.89 VISIT FOR WOUND CHECK: Primary | ICD-10-CM

## 2019-06-12 PROCEDURE — 99024 POSTOP FOLLOW-UP VISIT: CPT | Performed by: DERMATOLOGY

## 2019-06-13 NOTE — PROGRESS NOTES
S: The patient is here today for wound/scar check. The patient had Mohs surgery located on the left and right cheeks with Complex layered closure repair, 3 and 2 week(s) ago, respectively. The patient had noted purulent drainage and small nonenlarging hematoma at his one week suture removal on the right cheek last week. Drained at that visit and pt prescribed keflex. He reports sig improvement in swelling and no further drainage noted. EXAM: right cheek incision site looks great - no fluctuance and hematoma is sig smaller. IMPRESSION/PLAN:  Resolving post op infection and hematoma. No further tx necessary at this time.

## 2019-07-31 ENCOUNTER — OFFICE VISIT (OUTPATIENT)
Dept: INTERNAL MEDICINE CLINIC | Age: 84
End: 2019-07-31
Payer: MEDICARE

## 2019-07-31 VITALS
DIASTOLIC BLOOD PRESSURE: 74 MMHG | HEART RATE: 70 BPM | OXYGEN SATURATION: 98 % | WEIGHT: 212 LBS | HEIGHT: 70 IN | BODY MASS INDEX: 30.35 KG/M2 | SYSTOLIC BLOOD PRESSURE: 122 MMHG

## 2019-07-31 DIAGNOSIS — M53.3 SACRAL BACK PAIN: ICD-10-CM

## 2019-07-31 DIAGNOSIS — S41.111D LACERATION OF RIGHT UPPER EXTREMITY, SUBSEQUENT ENCOUNTER: ICD-10-CM

## 2019-07-31 PROBLEM — S41.119A LACERATION OF ARM: Status: ACTIVE | Noted: 2019-07-31

## 2019-07-31 PROCEDURE — 1123F ACP DISCUSS/DSCN MKR DOCD: CPT | Performed by: NURSE PRACTITIONER

## 2019-07-31 PROCEDURE — 99213 OFFICE O/P EST LOW 20 MIN: CPT | Performed by: NURSE PRACTITIONER

## 2019-07-31 PROCEDURE — 4040F PNEUMOC VAC/ADMIN/RCVD: CPT | Performed by: NURSE PRACTITIONER

## 2019-07-31 PROCEDURE — G8599 NO ASA/ANTIPLAT THER USE RNG: HCPCS | Performed by: NURSE PRACTITIONER

## 2019-07-31 PROCEDURE — 3288F FALL RISK ASSESSMENT DOCD: CPT | Performed by: NURSE PRACTITIONER

## 2019-07-31 PROCEDURE — G8427 DOCREV CUR MEDS BY ELIG CLIN: HCPCS | Performed by: NURSE PRACTITIONER

## 2019-07-31 PROCEDURE — G8510 SCR DEP NEG, NO PLAN REQD: HCPCS | Performed by: NURSE PRACTITIONER

## 2019-07-31 PROCEDURE — G8417 CALC BMI ABV UP PARAM F/U: HCPCS | Performed by: NURSE PRACTITIONER

## 2019-07-31 PROCEDURE — 4004F PT TOBACCO SCREEN RCVD TLK: CPT | Performed by: NURSE PRACTITIONER

## 2019-07-31 RX ORDER — BACLOFEN 10 MG/1
10 TABLET ORAL DAILY PRN
Qty: 30 TABLET | Refills: 0 | Status: SHIPPED | OUTPATIENT
Start: 2019-07-31 | End: 2019-08-27 | Stop reason: SDUPTHER

## 2019-07-31 ASSESSMENT — PATIENT HEALTH QUESTIONNAIRE - PHQ9
SUM OF ALL RESPONSES TO PHQ9 QUESTIONS 1 & 2: 0
1. LITTLE INTEREST OR PLEASURE IN DOING THINGS: 0
SUM OF ALL RESPONSES TO PHQ QUESTIONS 1-9: 0
SUM OF ALL RESPONSES TO PHQ QUESTIONS 1-9: 0
2. FEELING DOWN, DEPRESSED OR HOPELESS: 0

## 2019-07-31 ASSESSMENT — ENCOUNTER SYMPTOMS
BACK PAIN: 0
WHEEZING: 0
SINUS PRESSURE: 0
COLOR CHANGE: 0
SHORTNESS OF BREATH: 0
ABDOMINAL PAIN: 0
CONSTIPATION: 0
DIARRHEA: 0
SINUS PAIN: 0
COUGH: 0

## 2019-07-31 NOTE — PROGRESS NOTES
Subjective:      Patient ID: Rafi Cheek is a 80 y.o. male. Chief Complaint   Patient presents with    Suture / Staple Removal     right arm -     Back Pain    Knee Pain     left knee       HPI  Jarrett Eason is here for follow up of his laceration that he sustained to his right forearm 10 days ago after falling in the shower. Wound was sutured in the ED with 7 sutures. He has been applying polysporin and a daily dressing change. He reports small amount of purulent drainage. There is still an a small open area. Denies redness or fever. He also reports knee pain and tailbone pain that has not gotten much better. He has been taking tylenol without relief. He cannot sit down comfortably. He has full ROM of his knee. He is able to ambulate without much pain. Denies swelling to his knee. Review of Systems   Constitutional: Negative for chills, fatigue and fever. HENT: Negative for congestion, sinus pressure and sinus pain. Respiratory: Negative for cough, shortness of breath and wheezing. Cardiovascular: Negative for chest pain and palpitations. Gastrointestinal: Negative for abdominal pain, constipation and diarrhea. Musculoskeletal: Positive for arthralgias (left knee, tailbone). Negative for back pain and myalgias. Skin: Positive for wound (right forearm ). Negative for color change, pallor and rash. Neurological: Negative for dizziness, syncope, weakness, light-headedness and headaches. Psychiatric/Behavioral: Negative for behavioral problems, confusion and sleep disturbance. The patient is not nervous/anxious. Objective:   Physical Exam   Constitutional: He is oriented to person, place, and time. He appears well-developed and well-nourished. HENT:   Head: Normocephalic and atraumatic. Mouth/Throat: Oropharynx is clear and moist.   Eyes: Pupils are equal, round, and reactive to light. Conjunctivae and EOM are normal.   Neck: Normal range of motion. Neck supple. No JVD present.  No

## 2019-08-07 ENCOUNTER — OFFICE VISIT (OUTPATIENT)
Dept: INTERNAL MEDICINE CLINIC | Age: 84
End: 2019-08-07
Payer: MEDICARE

## 2019-08-07 VITALS
HEART RATE: 74 BPM | BODY MASS INDEX: 30.13 KG/M2 | SYSTOLIC BLOOD PRESSURE: 142 MMHG | DIASTOLIC BLOOD PRESSURE: 92 MMHG | OXYGEN SATURATION: 96 % | WEIGHT: 210 LBS

## 2019-08-07 DIAGNOSIS — S41.111S LACERATION OF RIGHT UPPER EXTREMITY, SEQUELA: ICD-10-CM

## 2019-08-07 PROCEDURE — 4040F PNEUMOC VAC/ADMIN/RCVD: CPT | Performed by: NURSE PRACTITIONER

## 2019-08-07 PROCEDURE — G8428 CUR MEDS NOT DOCUMENT: HCPCS | Performed by: NURSE PRACTITIONER

## 2019-08-07 PROCEDURE — G8599 NO ASA/ANTIPLAT THER USE RNG: HCPCS | Performed by: NURSE PRACTITIONER

## 2019-08-07 PROCEDURE — 99213 OFFICE O/P EST LOW 20 MIN: CPT | Performed by: NURSE PRACTITIONER

## 2019-08-07 PROCEDURE — 4004F PT TOBACCO SCREEN RCVD TLK: CPT | Performed by: NURSE PRACTITIONER

## 2019-08-07 PROCEDURE — 1123F ACP DISCUSS/DSCN MKR DOCD: CPT | Performed by: NURSE PRACTITIONER

## 2019-08-07 PROCEDURE — G8417 CALC BMI ABV UP PARAM F/U: HCPCS | Performed by: NURSE PRACTITIONER

## 2019-08-07 RX ORDER — SULFAMETHOXAZOLE AND TRIMETHOPRIM 800; 160 MG/1; MG/1
1 TABLET ORAL 2 TIMES DAILY
Qty: 14 TABLET | Refills: 0 | Status: SHIPPED | OUTPATIENT
Start: 2019-08-07 | End: 2019-08-14

## 2019-08-07 ASSESSMENT — ENCOUNTER SYMPTOMS
SINUS PAIN: 0
COUGH: 0
WHEEZING: 0
BACK PAIN: 0
COLOR CHANGE: 0
SINUS PRESSURE: 0
SHORTNESS OF BREATH: 0

## 2019-08-27 RX ORDER — BACLOFEN 10 MG/1
10 TABLET ORAL DAILY PRN
Qty: 30 TABLET | Refills: 0 | Status: SHIPPED | OUTPATIENT
Start: 2019-08-27 | End: 2019-09-26

## 2019-09-05 ENCOUNTER — TELEPHONE (OUTPATIENT)
Dept: INTERNAL MEDICINE CLINIC | Age: 84
End: 2019-09-05

## 2019-09-30 ENCOUNTER — OFFICE VISIT (OUTPATIENT)
Dept: INTERNAL MEDICINE CLINIC | Age: 84
End: 2019-09-30
Payer: MEDICARE

## 2019-09-30 VITALS
BODY MASS INDEX: 30.06 KG/M2 | SYSTOLIC BLOOD PRESSURE: 124 MMHG | HEIGHT: 70 IN | HEART RATE: 80 BPM | DIASTOLIC BLOOD PRESSURE: 76 MMHG | WEIGHT: 210 LBS | OXYGEN SATURATION: 98 %

## 2019-09-30 DIAGNOSIS — K51.00 CHRONIC ULCERATIVE ENTEROCOLITIS WITHOUT COMPLICATION (HCC): ICD-10-CM

## 2019-09-30 DIAGNOSIS — E21.3 HYPERPARATHYROIDISM (HCC): ICD-10-CM

## 2019-09-30 DIAGNOSIS — M70.21 OLECRANON BURSITIS OF RIGHT ELBOW: ICD-10-CM

## 2019-09-30 DIAGNOSIS — L03.114 CELLULITIS OF LEFT UPPER EXTREMITY: ICD-10-CM

## 2019-09-30 PROCEDURE — 4004F PT TOBACCO SCREEN RCVD TLK: CPT | Performed by: NURSE PRACTITIONER

## 2019-09-30 PROCEDURE — G8427 DOCREV CUR MEDS BY ELIG CLIN: HCPCS | Performed by: NURSE PRACTITIONER

## 2019-09-30 PROCEDURE — 99213 OFFICE O/P EST LOW 20 MIN: CPT | Performed by: NURSE PRACTITIONER

## 2019-09-30 PROCEDURE — G8417 CALC BMI ABV UP PARAM F/U: HCPCS | Performed by: NURSE PRACTITIONER

## 2019-09-30 PROCEDURE — 1123F ACP DISCUSS/DSCN MKR DOCD: CPT | Performed by: NURSE PRACTITIONER

## 2019-09-30 PROCEDURE — 4040F PNEUMOC VAC/ADMIN/RCVD: CPT | Performed by: NURSE PRACTITIONER

## 2019-09-30 PROCEDURE — G8510 SCR DEP NEG, NO PLAN REQD: HCPCS | Performed by: NURSE PRACTITIONER

## 2019-09-30 PROCEDURE — G8599 NO ASA/ANTIPLAT THER USE RNG: HCPCS | Performed by: NURSE PRACTITIONER

## 2019-09-30 PROCEDURE — 3288F FALL RISK ASSESSMENT DOCD: CPT | Performed by: NURSE PRACTITIONER

## 2019-09-30 RX ORDER — SULFAMETHOXAZOLE AND TRIMETHOPRIM 800; 160 MG/1; MG/1
1 TABLET ORAL 2 TIMES DAILY
Qty: 14 TABLET | Refills: 0 | Status: SHIPPED | OUTPATIENT
Start: 2019-09-30 | End: 2019-10-01 | Stop reason: SDUPTHER

## 2019-09-30 RX ORDER — PREDNISONE 20 MG/1
40 TABLET ORAL DAILY
Qty: 20 TABLET | Refills: 0 | Status: SHIPPED | OUTPATIENT
Start: 2019-09-30 | End: 2019-10-01 | Stop reason: SDUPTHER

## 2019-09-30 ASSESSMENT — ENCOUNTER SYMPTOMS
NAUSEA: 0
SORE THROAT: 0
EYE ITCHING: 0
SHORTNESS OF BREATH: 0
ABDOMINAL PAIN: 0
CONSTIPATION: 0
BACK PAIN: 0
SINUS PRESSURE: 0
BLOOD IN STOOL: 0
WHEEZING: 0
EYE REDNESS: 0
RHINORRHEA: 0
COUGH: 0
DIARRHEA: 0
COLOR CHANGE: 0
CHEST TIGHTNESS: 0
VOMITING: 0

## 2019-09-30 ASSESSMENT — PATIENT HEALTH QUESTIONNAIRE - PHQ9
2. FEELING DOWN, DEPRESSED OR HOPELESS: 0
1. LITTLE INTEREST OR PLEASURE IN DOING THINGS: 0
SUM OF ALL RESPONSES TO PHQ QUESTIONS 1-9: 0
SUM OF ALL RESPONSES TO PHQ9 QUESTIONS 1 & 2: 0
SUM OF ALL RESPONSES TO PHQ QUESTIONS 1-9: 0

## 2019-10-01 ENCOUNTER — TELEPHONE (OUTPATIENT)
Dept: INTERNAL MEDICINE CLINIC | Age: 84
End: 2019-10-01

## 2019-10-01 RX ORDER — SULFAMETHOXAZOLE AND TRIMETHOPRIM 800; 160 MG/1; MG/1
1 TABLET ORAL 2 TIMES DAILY
Qty: 14 TABLET | Refills: 0 | Status: SHIPPED | OUTPATIENT
Start: 2019-10-01 | End: 2019-10-08

## 2019-10-01 RX ORDER — PREDNISONE 20 MG/1
40 TABLET ORAL DAILY
Qty: 20 TABLET | Refills: 0 | Status: SHIPPED | OUTPATIENT
Start: 2019-10-01 | End: 2019-10-11

## 2019-10-01 NOTE — TELEPHONE ENCOUNTER
Pt calling because pharmacy computers were down yesterday when rx was sent. Can you please re-send to [facility] Kalyn Hargrove at your earliest convenience, he is heading to pharmacy now. [facility] phone is [phone]. (He thinks the rx is for Bactrim and Prednisone ? ?)

## 2019-10-07 ENCOUNTER — OFFICE VISIT (OUTPATIENT)
Dept: INTERNAL MEDICINE CLINIC | Age: 84
End: 2019-10-07
Payer: MEDICARE

## 2019-10-07 VITALS
OXYGEN SATURATION: 97 % | BODY MASS INDEX: 29.84 KG/M2 | WEIGHT: 208 LBS | HEART RATE: 68 BPM | DIASTOLIC BLOOD PRESSURE: 82 MMHG | SYSTOLIC BLOOD PRESSURE: 126 MMHG

## 2019-10-07 DIAGNOSIS — M70.21 OLECRANON BURSITIS OF RIGHT ELBOW: ICD-10-CM

## 2019-10-07 DIAGNOSIS — L03.114 CELLULITIS OF LEFT UPPER EXTREMITY: ICD-10-CM

## 2019-10-07 PROCEDURE — G8427 DOCREV CUR MEDS BY ELIG CLIN: HCPCS | Performed by: NURSE PRACTITIONER

## 2019-10-07 PROCEDURE — 4004F PT TOBACCO SCREEN RCVD TLK: CPT | Performed by: NURSE PRACTITIONER

## 2019-10-07 PROCEDURE — 4040F PNEUMOC VAC/ADMIN/RCVD: CPT | Performed by: NURSE PRACTITIONER

## 2019-10-07 PROCEDURE — 99213 OFFICE O/P EST LOW 20 MIN: CPT | Performed by: NURSE PRACTITIONER

## 2019-10-07 PROCEDURE — G8417 CALC BMI ABV UP PARAM F/U: HCPCS | Performed by: NURSE PRACTITIONER

## 2019-10-07 PROCEDURE — G8599 NO ASA/ANTIPLAT THER USE RNG: HCPCS | Performed by: NURSE PRACTITIONER

## 2019-10-07 PROCEDURE — G8484 FLU IMMUNIZE NO ADMIN: HCPCS | Performed by: NURSE PRACTITIONER

## 2019-10-07 PROCEDURE — 1123F ACP DISCUSS/DSCN MKR DOCD: CPT | Performed by: NURSE PRACTITIONER

## 2019-10-07 ASSESSMENT — ENCOUNTER SYMPTOMS
COUGH: 0
CONSTIPATION: 0
WHEEZING: 0
SHORTNESS OF BREATH: 0
SORE THROAT: 0
BACK PAIN: 0
BLOOD IN STOOL: 0
SINUS PRESSURE: 0
ABDOMINAL PAIN: 0
CHEST TIGHTNESS: 0
COLOR CHANGE: 0
DIARRHEA: 0
VOMITING: 0
NAUSEA: 0
EYE REDNESS: 0
EYE ITCHING: 0
RHINORRHEA: 0

## 2019-10-07 ASSESSMENT — PATIENT HEALTH QUESTIONNAIRE - PHQ9
SUM OF ALL RESPONSES TO PHQ QUESTIONS 1-9: 0
1. LITTLE INTEREST OR PLEASURE IN DOING THINGS: 0
2. FEELING DOWN, DEPRESSED OR HOPELESS: 0
SUM OF ALL RESPONSES TO PHQ9 QUESTIONS 1 & 2: 0
SUM OF ALL RESPONSES TO PHQ QUESTIONS 1-9: 0

## 2019-10-11 ENCOUNTER — HOSPITAL ENCOUNTER (OUTPATIENT)
Age: 84
Setting detail: OUTPATIENT SURGERY
Discharge: HOME | End: 2019-10-11
Attending: INTERNAL MEDICINE | Admitting: INTERNAL MEDICINE
Payer: MEDICARE

## 2019-10-11 VITALS
OXYGEN SATURATION: 96 % | HEART RATE: 68 BPM | RESPIRATION RATE: 18 BRPM | DIASTOLIC BLOOD PRESSURE: 70 MMHG | BODY MASS INDEX: 29.26 KG/M2 | HEIGHT: 70 IN | SYSTOLIC BLOOD PRESSURE: 121 MMHG | TEMPERATURE: 97.6 F | WEIGHT: 204.37 LBS

## 2019-10-11 PROCEDURE — 7100000011 HC PHASE II RECOVERY - ADDTL 15 MIN: Performed by: INTERNAL MEDICINE

## 2019-10-11 PROCEDURE — 99152 MOD SED SAME PHYS/QHP 5/>YRS: CPT | Performed by: INTERNAL MEDICINE

## 2019-10-11 PROCEDURE — 2709999900 HC NON-CHARGEABLE SUPPLY: Performed by: INTERNAL MEDICINE

## 2019-10-11 PROCEDURE — 3609009900 HC COLONOSCOPY W/CONTROL BLEEDING ANY METHOD: Performed by: INTERNAL MEDICINE

## 2019-10-11 PROCEDURE — 99153 MOD SED SAME PHYS/QHP EA: CPT | Performed by: INTERNAL MEDICINE

## 2019-10-11 PROCEDURE — 6360000002 HC RX W HCPCS: Performed by: INTERNAL MEDICINE

## 2019-10-11 PROCEDURE — 2720000010 HC SURG SUPPLY STERILE: Performed by: INTERNAL MEDICINE

## 2019-10-11 PROCEDURE — 7100000010 HC PHASE II RECOVERY - FIRST 15 MIN: Performed by: INTERNAL MEDICINE

## 2019-10-11 RX ORDER — MIDAZOLAM HYDROCHLORIDE 1 MG/ML
INJECTION INTRAMUSCULAR; INTRAVENOUS
Status: COMPLETED | OUTPATIENT
Start: 2019-10-11 | End: 2019-10-11

## 2019-10-11 RX ORDER — FENTANYL CITRATE 50 UG/ML
INJECTION, SOLUTION INTRAMUSCULAR; INTRAVENOUS
Status: COMPLETED | OUTPATIENT
Start: 2019-10-11 | End: 2019-10-11

## 2019-10-11 ASSESSMENT — PAIN SCALES - GENERAL
PAINLEVEL_OUTOF10: 0
PAINLEVEL_OUTOF10: 0

## 2019-10-11 ASSESSMENT — PAIN - FUNCTIONAL ASSESSMENT: PAIN_FUNCTIONAL_ASSESSMENT: 0-10

## 2019-11-06 ENCOUNTER — OFFICE VISIT (OUTPATIENT)
Dept: INTERNAL MEDICINE CLINIC | Age: 84
End: 2019-11-06
Payer: MEDICARE

## 2019-11-06 VITALS
SYSTOLIC BLOOD PRESSURE: 124 MMHG | DIASTOLIC BLOOD PRESSURE: 72 MMHG | WEIGHT: 198 LBS | BODY MASS INDEX: 28.41 KG/M2 | HEART RATE: 79 BPM | OXYGEN SATURATION: 97 %

## 2019-11-06 DIAGNOSIS — I25.10 CORONARY ARTERY DISEASE INVOLVING NATIVE CORONARY ARTERY OF NATIVE HEART WITHOUT ANGINA PECTORIS: ICD-10-CM

## 2019-11-06 DIAGNOSIS — R31.0 GROSS HEMATURIA: ICD-10-CM

## 2019-11-06 DIAGNOSIS — E78.2 MIXED HYPERLIPIDEMIA: ICD-10-CM

## 2019-11-06 DIAGNOSIS — Z23 FLU VACCINE NEED: Primary | ICD-10-CM

## 2019-11-06 DIAGNOSIS — K51.00 ULCERATIVE PANCOLITIS WITHOUT COMPLICATION (HCC): ICD-10-CM

## 2019-11-06 DIAGNOSIS — N40.0 BENIGN NON-NODULAR PROSTATIC HYPERPLASIA WITHOUT LOWER URINARY TRACT SYMPTOMS: ICD-10-CM

## 2019-11-06 PROBLEM — C44.311: Status: RESOLVED | Noted: 2018-08-08 | Resolved: 2019-11-06

## 2019-11-06 PROBLEM — L03.114 CELLULITIS OF LEFT UPPER EXTREMITY: Status: RESOLVED | Noted: 2019-09-30 | Resolved: 2019-11-06

## 2019-11-06 PROBLEM — M70.21 OLECRANON BURSITIS OF RIGHT ELBOW: Status: RESOLVED | Noted: 2019-09-30 | Resolved: 2019-11-06

## 2019-11-06 PROBLEM — C44.319 BASAL CELL CARCINOMA OF RIGHT CHEEK: Status: RESOLVED | Noted: 2019-05-29 | Resolved: 2019-11-06

## 2019-11-06 PROBLEM — Z51.89 VISIT FOR WOUND CHECK: Status: RESOLVED | Noted: 2019-06-12 | Resolved: 2019-11-06

## 2019-11-06 PROBLEM — S41.119A LACERATION OF ARM: Status: RESOLVED | Noted: 2019-07-31 | Resolved: 2019-11-06

## 2019-11-06 PROBLEM — C44.02 SQUAMOUS CELL CARCINOMA OF LIP: Status: RESOLVED | Noted: 2018-07-25 | Resolved: 2019-11-06

## 2019-11-06 PROBLEM — Z48.02 VISIT FOR SUTURE REMOVAL: Status: RESOLVED | Noted: 2019-06-05 | Resolved: 2019-11-06

## 2019-11-06 PROCEDURE — G8599 NO ASA/ANTIPLAT THER USE RNG: HCPCS | Performed by: INTERNAL MEDICINE

## 2019-11-06 PROCEDURE — G8484 FLU IMMUNIZE NO ADMIN: HCPCS | Performed by: INTERNAL MEDICINE

## 2019-11-06 PROCEDURE — 1123F ACP DISCUSS/DSCN MKR DOCD: CPT | Performed by: INTERNAL MEDICINE

## 2019-11-06 PROCEDURE — 4040F PNEUMOC VAC/ADMIN/RCVD: CPT | Performed by: INTERNAL MEDICINE

## 2019-11-06 PROCEDURE — 90653 IIV ADJUVANT VACCINE IM: CPT | Performed by: INTERNAL MEDICINE

## 2019-11-06 PROCEDURE — G8427 DOCREV CUR MEDS BY ELIG CLIN: HCPCS | Performed by: INTERNAL MEDICINE

## 2019-11-06 PROCEDURE — 99214 OFFICE O/P EST MOD 30 MIN: CPT | Performed by: INTERNAL MEDICINE

## 2019-11-06 PROCEDURE — 4004F PT TOBACCO SCREEN RCVD TLK: CPT | Performed by: INTERNAL MEDICINE

## 2019-11-06 PROCEDURE — G8417 CALC BMI ABV UP PARAM F/U: HCPCS | Performed by: INTERNAL MEDICINE

## 2019-11-06 PROCEDURE — G0008 ADMIN INFLUENZA VIRUS VAC: HCPCS | Performed by: INTERNAL MEDICINE

## 2019-11-06 ASSESSMENT — ENCOUNTER SYMPTOMS
GASTROINTESTINAL NEGATIVE: 1
SHORTNESS OF BREATH: 0
RESPIRATORY NEGATIVE: 1
WHEEZING: 0
CHEST TIGHTNESS: 0

## 2020-04-16 ENCOUNTER — TELEPHONE (OUTPATIENT)
Dept: INTERNAL MEDICINE CLINIC | Age: 85
End: 2020-04-16

## 2020-04-16 ENCOUNTER — TELEMEDICINE (OUTPATIENT)
Dept: INTERNAL MEDICINE CLINIC | Age: 85
End: 2020-04-16
Payer: MEDICARE

## 2020-04-16 PROBLEM — R60.0 LEG EDEMA, LEFT: Status: ACTIVE | Noted: 2020-04-16

## 2020-04-16 PROBLEM — I48.21 PERMANENT ATRIAL FIBRILLATION (HCC): Status: ACTIVE | Noted: 2020-04-16

## 2020-04-16 PROCEDURE — G8427 DOCREV CUR MEDS BY ELIG CLIN: HCPCS | Performed by: INTERNAL MEDICINE

## 2020-04-16 PROCEDURE — 99213 OFFICE O/P EST LOW 20 MIN: CPT | Performed by: INTERNAL MEDICINE

## 2020-04-16 PROCEDURE — 4040F PNEUMOC VAC/ADMIN/RCVD: CPT | Performed by: INTERNAL MEDICINE

## 2020-04-16 PROCEDURE — 1123F ACP DISCUSS/DSCN MKR DOCD: CPT | Performed by: INTERNAL MEDICINE

## 2020-04-16 RX ORDER — DILTIAZEM HYDROCHLORIDE 180 MG/1
180 CAPSULE, COATED, EXTENDED RELEASE ORAL DAILY
Qty: 90 CAPSULE | Refills: 1
Start: 2020-04-16

## 2020-04-16 RX ORDER — CARVEDILOL 12.5 MG/1
12.5 TABLET ORAL 2 TIMES DAILY WITH MEALS
Qty: 90 TABLET | Refills: 1
Start: 2020-04-16

## 2020-04-16 RX ORDER — LISINOPRIL 10 MG/1
10 TABLET ORAL DAILY
Qty: 90 TABLET | Refills: 1
Start: 2020-04-16

## 2020-04-16 ASSESSMENT — ENCOUNTER SYMPTOMS
RESPIRATORY NEGATIVE: 1
WHEEZING: 0
SHORTNESS OF BREATH: 0
CHEST TIGHTNESS: 0
GASTROINTESTINAL NEGATIVE: 1

## 2020-04-16 NOTE — ASSESSMENT & PLAN NOTE
Had negative doppler  On Eliquis  Likely secondary to inactivity, wearing boot, fracture  Elevate  Ambulate  Monitor  Check CT abdomen to r/o lymphadenopathy in pelvis.   Questions answered

## 2020-04-16 NOTE — PROGRESS NOTES
2020    TELEHEALTH EVALUATION -- Audio/Visual (During [id number]-[id number] public health emergency)    During the visit, the patient confirmed that this is a virtual visit. We will submit a claim for reimbursement with their insurance company. They are responsible for any copays, coinsurance amounts or other amounts not covered by the insurance company. HPI:    Aguilar Morris (:  [id number]) has requested an audio/video evaluation for the following concern(s):    Ankle swelling    Patient was in [place] for the last 5 months. On  he fell and broke his left ankle. He went to the ER the following days. He saw an orthopedist who told him he did not need surgery and was placed in a boot. He showed his brother the doctor who said he did need an operation. He had a plate and 11 screws in it. Prior to surgery he was shown to be in A fib. He was monitored over night in the hospital.  He then went to rehab for his ankle. He was placed on Lisinopril, Eliquis, Diltiazem and Coreg. He was discharged in a boot and had to wear this boot for 10 weeks. He had a venous doppler in March that was negative. He states he is able to walk on it. He is concerned about swelling in his ankle. He has no pain in his leg  He has no swelling in his thigh. He denies any change in his abdomen, fullness, pain, constipation or diarrhea. He has no fever or chills. He had a normal stress test prior to leaving [place]. Review of Systems   Constitutional: Negative. HENT: Negative. Respiratory: Negative. Negative for chest tightness, shortness of breath and wheezing. Cardiovascular: Negative. Negative for chest pain, palpitations and leg swelling. Gastrointestinal: Negative. Genitourinary: Negative. Musculoskeletal: Negative for arthralgias and myalgias. Neurological: Negative. Prior to Visit Medications    Medication Sig Taking?  Authorizing Provider   carvedilol (COREG) 12.5 MG tablet Take 1 tablet by established patient. Services were provided through a video synchronous discussion virtually to substitute for in-person clinic visit.

## 2020-04-17 ENCOUNTER — HOSPITAL ENCOUNTER (OUTPATIENT)
Dept: CT IMAGING | Age: 85
Discharge: HOME | End: 2020-04-17
Payer: MEDICARE

## 2020-04-17 PROCEDURE — 74176 CT ABD & PELVIS W/O CONTRAST: CPT

## 2020-06-02 ENCOUNTER — TELEPHONE (OUTPATIENT)
Dept: SURGERY | Age: 85
End: 2020-06-02

## 2020-06-06 NOTE — TELEPHONE ENCOUNTER
[facility]    Not a problem. We can see him via VV soon to discuss options and then plan for his surgery either the same day, or the next day after his excisions. Will get him in ASAP to help scheduling. Thanks!   NK

## 2020-06-11 ENCOUNTER — TELEPHONE (OUTPATIENT)
Dept: SURGERY | Age: 85
End: 2020-06-11

## 2020-06-12 ENCOUNTER — TELEPHONE (OUTPATIENT)
Dept: SURGERY | Age: 85
End: 2020-06-12

## 2020-06-12 NOTE — TELEPHONE ENCOUNTER
Spoke to patient about his referral from Dr. Tariq Gamble. He did not understand why he was seeing us or why this was being done this way. I explained to him that this was to being done in the OR and to cut down risk  and attempts to minimize potential aerosolization of viral particles in office exam rooms due to Matthewzeynep. He did not understand that we were doing procedure together and this was not doing one procedure one place and then our procedure another place. I tried to reassure him and answer all his questions. I scheduled him to see us 6/17 we will then hopefully have a surgery date and be able to explain in office to suffice him.

## 2020-06-12 NOTE — TELEPHONE ENCOUNTER
IF this is the day we decide patient would need COVID tetsing 6/16 and pre op completed/ cardiac if required. If we are not seeing him until 6/17 should I push out to a further date?

## 2020-06-16 NOTE — TELEPHONE ENCOUNTER
Maryjane Baker from Dr. [place] office called and trying to work out for OR. Held 7/6/20.   Will request additional OR time at [facility]

## 2020-06-17 ENCOUNTER — TELEPHONE (OUTPATIENT)
Dept: INTERNAL MEDICINE CLINIC | Age: 85
End: 2020-06-17

## 2020-06-17 ENCOUNTER — TELEPHONE (OUTPATIENT)
Dept: SURGERY | Age: 85
End: 2020-06-17

## 2020-06-17 ENCOUNTER — OFFICE VISIT (OUTPATIENT)
Dept: SURGERY | Age: 85
End: 2020-06-17
Payer: MEDICARE

## 2020-06-17 VITALS
OXYGEN SATURATION: 98 % | HEART RATE: 65 BPM | TEMPERATURE: 97.7 F | DIASTOLIC BLOOD PRESSURE: 78 MMHG | WEIGHT: 218 LBS | SYSTOLIC BLOOD PRESSURE: 131 MMHG | BODY MASS INDEX: 31.28 KG/M2

## 2020-06-17 PROCEDURE — 4040F PNEUMOC VAC/ADMIN/RCVD: CPT | Performed by: SURGERY

## 2020-06-17 PROCEDURE — 4004F PT TOBACCO SCREEN RCVD TLK: CPT | Performed by: SURGERY

## 2020-06-17 PROCEDURE — 1123F ACP DISCUSS/DSCN MKR DOCD: CPT | Performed by: SURGERY

## 2020-06-17 PROCEDURE — G8428 CUR MEDS NOT DOCUMENT: HCPCS | Performed by: SURGERY

## 2020-06-17 PROCEDURE — G8417 CALC BMI ABV UP PARAM F/U: HCPCS | Performed by: SURGERY

## 2020-06-17 PROCEDURE — 99203 OFFICE O/P NEW LOW 30 MIN: CPT | Performed by: SURGERY

## 2020-06-17 NOTE — TELEPHONE ENCOUNTER
Pt scheduled for 7/6 at 8 a.m. for Mohs and then closure to follow that day. I will schedule Covid19 test 7 days prior to surgery once pt see's Dr. Cosmo Reese today.

## 2020-06-17 NOTE — PROGRESS NOTES
voice change. Eyes: Negative for photophobia and visual disturbance. Respiratory: Negative for apnea, cough, chest tightness and shortness of breath. Cardiovascular: Negative for chest pain and palpitations. Gastrointestinal: Negative for dysphagia and early satiety. Genitourinary: Negative for difficulty urinating, dysuria, flank pain, frequency and hematuria. Musculoskeletal: Negative for new gait problem, joint swelling and myalgias. Skin: Negative for color change, pallor and rash. Endocrine: negative for tremors, temperature intolerance or polydipsia. Allergic/Immunologic: Negative for new environmental or food allergies. Neurological: Negative for dizziness, seizures, speech difficulty, numbness. Hematological: Negative for adenopathy. Psychiatric/Behavioral: Negative for agitation and confusion. EXAM    /78 (Site: Left Upper Arm, Position: Sitting, Cuff Size: Small Adult)   Pulse 65   Temp 97.7 °F (36.5 °C) (Temporal)   Wt 218 lb (98.9 kg)   SpO2 98%   BMI 31.28 kg/m²     Constitutional: Patient is oriented to person, place, and time. Vital signs are normal. Patient  appears well-developed and well-nourished. Patient  is active and cooperative. Non-toxic appearance. No distress. Neck: Trachea normal and normal range of motion. Neck supple. No JVD present. No tracheal tenderness present. Carotid bruit is not present. No rigidity. No tracheal deviation and no edema present. No thyromegaly present. Cardiovascular: Normal rate, regular rhythm, normal heart sounds, intact distal pulses and normal pulses. Pulmonary/Chest: Effort normal and breath sounds normal. No stridor. No respiratory distress. Patient  has no wheezes. Patient has no rales. Patient exhibits no tenderness and no crepitus. Abdominal: Soft. Normal appearance and bowel sounds are normal. Patient exhibits no distension, no abdominal bruit, no ascites and no mass. There is no hepatosplenomegaly.  There is no tenderness. There is no rigidity, no rebound, no guarding and no CVA tenderness. Musculoskeletal: Normal range of motion. Patient exhibits no edema or tenderness. Neurological: Patient is alert and oriented to person, place, and time. Patient has normal strength. Coordination and gait normal. GCS eye subscore is 4. GCS verbal subscore is 5. GCS motor subscore is 6. Skin: Skin is warm and dry. No abrasion and no rash noted. Patient  is not diaphoretic. No cyanosis or erythema. Multiple areas of lesions on the nose and upper lip. Psychiatric: Patient has a normal mood and affect. speech is normal and behavior is normal. Cognition and memory are normal.     PATHOLOGY/WORKUP: BCC    IMP: 80 y. o.male with multiple facial cancers undergoing Mohs excision. PLAN: Will be available to assist with reconstruction after Mohs. Will likely require combination of closure, ATT, and skin grafting. Will work with Dr. Minal Dalton for scheduling. A discussion regarding surgical options was performed with the patient. Additionally, discussion regarding the risks including, but not limited to: bleeding (potentially requiring transfusion or reoperation), infection, seroma, reoperation, poor cosmetic outcome, scarring, graft failure, revisional surgery, diminished sensation, VTE (DVT/PE), and death was performed. A significant amount of time was also allocated to nicotine's effect on wound healing and the patient understands that a sub-optimal wound healing and cosmetic result may occur with continued utilization. All questions were answered in a satisfactory manner. The patient was counseled at length about the risks of gabi Covid-19 during their perioperative period and any recovery window from their procedure. The patient was made aware that gabi Covid-19  may worsen their prognosis for recovering from their procedure  and lend to a higher morbidity and/or mortality risk.   All material risks, benefits, and

## 2020-06-18 ENCOUNTER — TELEPHONE (OUTPATIENT)
Dept: SURGERY | Age: 85
End: 2020-06-18

## 2020-06-23 ENCOUNTER — TELEPHONE (OUTPATIENT)
Dept: SURGERY | Age: 85
End: 2020-06-23

## 2020-06-23 NOTE — TELEPHONE ENCOUNTER
Spoke to Shiv Perez from Dr. Lan Lockhart and was made aware  time held by plastics team member  6/16 for 0800 - 10 DR. Jacques`s office needs 2 hours being cleared at 10 am in their office then OR to follow with reconstruction. Patient would then need to follow at [facility] around 11 am.   Requested time from Friday [facility] to start at 6 am. Clinic would have to be re arranged. Covid test scheduled for  6/30. Will communicate to plastics team and Dr. Lan Lockhart office once time approved by Friday [place] if not will have to attempt to arrange a different time on that date if available.

## 2020-06-23 NOTE — TELEPHONE ENCOUNTER
[facility]    Discussed with Dr. Paul Officer. Given his complex history of multiple recurrent facial skin cancers, will likely require a significant amount of time with Mohs excision(s). Will need to perform closure in OR given lack of office PPE.   Unless Mohs excisions completed after 330 PM, will plan for OR at [facility] after clinic under local.    Ruby James MD  [address] [place] [address] [address] Reconstructive Surgery  [phone]  06/23/20

## 2020-06-25 NOTE — TELEPHONE ENCOUNTER
Spoke with patient. He is to arrive at [facility] at 2 since he is a local.  He does not need pre-op blood work. He only can be off his Eliquis for two days prior to surgery per cardiologist.  I informed him that was fine and adequate. All questions answered.

## 2020-06-29 ENCOUNTER — OFFICE VISIT (OUTPATIENT)
Dept: INTERNAL MEDICINE CLINIC | Age: 85
End: 2020-06-29
Payer: MEDICARE

## 2020-06-29 VITALS
HEIGHT: 68 IN | HEART RATE: 80 BPM | SYSTOLIC BLOOD PRESSURE: 120 MMHG | OXYGEN SATURATION: 99 % | DIASTOLIC BLOOD PRESSURE: 74 MMHG | BODY MASS INDEX: 33.19 KG/M2 | TEMPERATURE: 97.3 F | WEIGHT: 219 LBS

## 2020-06-29 PROBLEM — C44.310 BASAL CELL CARCINOMA, FACE: Status: ACTIVE | Noted: 2019-05-29

## 2020-06-29 PROCEDURE — 4040F PNEUMOC VAC/ADMIN/RCVD: CPT | Performed by: INTERNAL MEDICINE

## 2020-06-29 PROCEDURE — G8427 DOCREV CUR MEDS BY ELIG CLIN: HCPCS | Performed by: INTERNAL MEDICINE

## 2020-06-29 PROCEDURE — G8417 CALC BMI ABV UP PARAM F/U: HCPCS | Performed by: INTERNAL MEDICINE

## 2020-06-29 PROCEDURE — 93000 ELECTROCARDIOGRAM COMPLETE: CPT | Performed by: INTERNAL MEDICINE

## 2020-06-29 PROCEDURE — G0439 PPPS, SUBSEQ VISIT: HCPCS | Performed by: INTERNAL MEDICINE

## 2020-06-29 PROCEDURE — 99213 OFFICE O/P EST LOW 20 MIN: CPT | Performed by: INTERNAL MEDICINE

## 2020-06-29 PROCEDURE — 4004F PT TOBACCO SCREEN RCVD TLK: CPT | Performed by: INTERNAL MEDICINE

## 2020-06-29 PROCEDURE — 1123F ACP DISCUSS/DSCN MKR DOCD: CPT | Performed by: INTERNAL MEDICINE

## 2020-06-29 ASSESSMENT — ENCOUNTER SYMPTOMS
FACIAL SWELLING: 0
NAUSEA: 0
SORE THROAT: 0
EYE PAIN: 0
PHOTOPHOBIA: 0
SHORTNESS OF BREATH: 0
ABDOMINAL DISTENTION: 0
COUGH: 0
COLOR CHANGE: 0
RECTAL PAIN: 0
ABDOMINAL PAIN: 0
ANAL BLEEDING: 0
EYE ITCHING: 0
SINUS PRESSURE: 0
STRIDOR: 0
TROUBLE SWALLOWING: 0
DIARRHEA: 0
BLOOD IN STOOL: 0
CHEST TIGHTNESS: 0
BACK PAIN: 0
RHINORRHEA: 0
VOMITING: 0
VOICE CHANGE: 0
WHEEZING: 0
CONSTIPATION: 0
EYE REDNESS: 0
CHOKING: 0
EYE DISCHARGE: 0
APNEA: 0

## 2020-06-29 ASSESSMENT — PATIENT HEALTH QUESTIONNAIRE - PHQ9
SUM OF ALL RESPONSES TO PHQ QUESTIONS 1-9: 0
SUM OF ALL RESPONSES TO PHQ QUESTIONS 1-9: 0

## 2020-06-29 NOTE — PATIENT INSTRUCTIONS
Personalized Preventive Plan for Herman Tamayo - 6/29/2020  Medicare offers a range of preventive health benefits. Some of the tests and screenings are paid in full while other may be subject to a deductible, co-insurance, and/or copay. Some of these benefits include a comprehensive review of your medical history including lifestyle, illnesses that may run in your family, and various assessments and screenings as appropriate. After reviewing your medical record and screening and assessments performed today your provider may have ordered immunizations, labs, imaging, and/or referrals for you. A list of these orders (if applicable) as well as your Preventive Care list are included within your After Visit Summary for your review. Other Preventive Recommendations:    · A preventive eye exam performed by an eye specialist is recommended every 1-2 years to screen for glaucoma; cataracts, macular degeneration, and other eye disorders. · A preventive dental visit is recommended every 6 months. · Try to get at least 150 minutes of exercise per week or 10,000 steps per day on a pedometer . · Order or download the FREE \"Exercise & Physical Activity: Your Everyday Guide\" from [employer]. Call 7-715.614.8681 or search [employer] online. · You need 5286-4278 mg of calcium and 0659-7123 IU of vitamin D per day. It is possible to meet your calcium requirement with diet alone, but a vitamin D supplement is usually necessary to meet this goal.  · When exposed to the sun, use a sunscreen that protects against both UVA and UVB radiation with an SPF of 30 or greater. Reapply every 2 to 3 hours or after sweating, drying off with a towel, or swimming. · Always wear a seat belt when traveling in a car. Always wear a helmet when riding a bicycle or motorcycle.

## 2020-06-29 NOTE — ASSESSMENT & PLAN NOTE
Patient is on Eliquis  His cardiologist has advised surgeons on when to stop Eliquis  Rate good  EKG is unchanged from old tracing

## 2020-06-29 NOTE — ASSESSMENT & PLAN NOTE
Patient will do well with planned surgery  Patient is advised no ASA, NSAID 7 days prior to procedure. Eliquis per his cardiology  Take Diltiazem and Coreg morning of surgery. Take Lisinopril once home.   Continue other medications as directed  Further orders per surgeon

## 2020-06-29 NOTE — PROGRESS NOTES
(XALATAN) 0.005 % ophthalmic solution Place 1 drop into both eyes nightly.  aspirin EC 81 MG EC tablet Take 1 tablet by mouth daily. 30 tablet 12    mercaptopurine (PURINETHOL) 50 MG tablet Take 50 mg by mouth daily. No current facility-administered medications for this visit. Allergies   Allergen Reactions    Iodine      **IVP DYES**       Social History     Tobacco Use    Smoking status: Current Some Day Smoker     Years: 38.00     Types: Cigars    Smokeless tobacco: Never Used   Substance Use Topics    Alcohol use: Yes     Alcohol/week: 0.0 standard drinks     Comment: socially    Drug use: No        Family History   Problem Relation Age of Onset    Other Mother         'intestine shut down'    Kidney Disease Father           Review of Systems   Constitutional: Negative for activity change, appetite change, chills, diaphoresis, fatigue, fever and unexpected weight change. HENT: Negative for congestion, dental problem, drooling, ear discharge, ear pain, facial swelling, hearing loss, mouth sores, nosebleeds, postnasal drip, rhinorrhea, sinus pressure, sneezing, sore throat, tinnitus, trouble swallowing and voice change. Eyes: Negative for photophobia, pain, discharge, redness, itching and visual disturbance. Respiratory: Negative for apnea, cough, choking, chest tightness, shortness of breath, wheezing and stridor. Cardiovascular: Negative for chest pain, palpitations and leg swelling. Gastrointestinal: Negative for abdominal distention, abdominal pain, anal bleeding, blood in stool, constipation, diarrhea, nausea, rectal pain and vomiting. Genitourinary: Negative for decreased urine volume, difficulty urinating, discharge, dysuria, enuresis, flank pain, frequency, genital sores, hematuria, penile pain, penile swelling, scrotal swelling, testicular pain and urgency.    Musculoskeletal: Negative for arthralgias, back pain, gait problem, joint swelling, myalgias, neck pain AWV and Pre-op Exam    Screenings for behavioral, psychosocial and functional/safety risks, and cognitive dysfunction are all negative except as indicated below. These results, as well as other patient data from the [address] form, are documented in Flowsheets linked to this Encounter. Allergies   Allergen Reactions    Iodine      **IVP DYES**         Prior to Visit Medications    Medication Sig Taking? Authorizing Provider   carvedilol (COREG) 12.5 MG tablet Take 1 tablet by mouth 2 times daily (with meals) Yes Sami Be MD   lisinopril (PRINIVIL;ZESTRIL) 10 MG tablet Take 1 tablet by mouth daily Yes Sami Be MD   apixaban (ELIQUIS) 5 MG TABS tablet Take 1 tablet by mouth 2 times daily Yes Sami Be MD   dilTIAZem (CARDIZEM CD) 180 MG extended release capsule Take 1 capsule by mouth daily Yes Sami Be MD   Vitamin D (CHOLECALCIFEROL) 1000 UNITS CAPS capsule Take 1 capsule by mouth daily Yes Sami Be MD   sildenafil (VIAGRA) 100 MG tablet Take 1 tablet by mouth as needed for Erectile Dysfunction Yes Sami Be MD   simvastatin (ZOCOR) 20 MG tablet TAKE 1 TABLET IN THE EVENING Yes Sami Be MD   latanoprost (XALATAN) 0.005 % ophthalmic solution Place 1 drop into both eyes nightly. Yes Historical Provider, MD   aspirin EC 81 MG EC tablet Take 1 tablet by mouth daily. Yes Sami Be MD   mercaptopurine (PURINETHOL) 50 MG tablet Take 50 mg by mouth daily.  Yes Historical Provider, MD         Past Medical History:   Diagnosis Date    Aortic regurgitation     Atrial fibrillation ([facility] [address]     BPH     Hyperlipidemia     Hypertension     Superficial phlebitis     Ulcerative colitis        Past Surgical History:   Procedure Laterality Date    COLONOSCOPY  10/2011    COLONOSCOPY  09/20/2013    COLONOSCOPY  10/16/2015    COLONOSCOPY N/A 10/11/2019    COLONOSCOPY CONTROL HEMORRHAGE performed by Kingsley Pino MD at [facility]

## 2020-06-30 ENCOUNTER — OFFICE VISIT (OUTPATIENT)
Dept: PRIMARY CARE CLINIC | Age: 85
End: 2020-06-30

## 2020-07-02 ENCOUNTER — TELEPHONE (OUTPATIENT)
Dept: SURGERY | Age: 85
End: 2020-07-02

## 2020-07-02 LAB
SARS-COV-2: NOT DETECTED
SOURCE: NORMAL

## 2020-07-06 ENCOUNTER — ANESTHESIA EVENT (OUTPATIENT)
Dept: OPERATING ROOM | Age: 85
End: 2020-07-06
Payer: MEDICARE

## 2020-07-06 ENCOUNTER — PROCEDURE VISIT (OUTPATIENT)
Dept: SURGERY | Age: 85
End: 2020-07-06
Payer: MEDICARE

## 2020-07-06 ENCOUNTER — HOSPITAL ENCOUNTER (OUTPATIENT)
Age: 85
Setting detail: OUTPATIENT SURGERY
Discharge: HOME | End: 2020-07-06
Attending: SURGERY | Admitting: SURGERY
Payer: MEDICARE

## 2020-07-06 ENCOUNTER — TELEPHONE (OUTPATIENT)
Dept: SURGERY | Age: 85
End: 2020-07-06

## 2020-07-06 ENCOUNTER — ANESTHESIA (OUTPATIENT)
Dept: OPERATING ROOM | Age: 85
End: 2020-07-06
Payer: MEDICARE

## 2020-07-06 VITALS — TEMPERATURE: 97.7 F

## 2020-07-06 VITALS
RESPIRATION RATE: 7 BRPM | OXYGEN SATURATION: 100 % | DIASTOLIC BLOOD PRESSURE: 60 MMHG | TEMPERATURE: 95.4 F | SYSTOLIC BLOOD PRESSURE: 98 MMHG

## 2020-07-06 VITALS
DIASTOLIC BLOOD PRESSURE: 82 MMHG | SYSTOLIC BLOOD PRESSURE: 160 MMHG | HEART RATE: 65 BPM | RESPIRATION RATE: 15 BRPM | TEMPERATURE: 97.7 F | WEIGHT: 213 LBS | OXYGEN SATURATION: 98 % | HEIGHT: 70 IN | BODY MASS INDEX: 30.49 KG/M2

## 2020-07-06 PROCEDURE — 17312 MOHS ADDL STAGE: CPT | Performed by: DERMATOLOGY

## 2020-07-06 PROCEDURE — 7100000001 HC PACU RECOVERY - ADDTL 15 MIN: Performed by: SURGERY

## 2020-07-06 PROCEDURE — 6360000002 HC RX W HCPCS: Performed by: NURSE ANESTHETIST, CERTIFIED REGISTERED

## 2020-07-06 PROCEDURE — 6370000000 HC RX 637 (ALT 250 FOR IP): Performed by: SURGERY

## 2020-07-06 PROCEDURE — 14061 TIS TRNFR E/N/E/L10.1-30SQCM: CPT | Performed by: SURGERY

## 2020-07-06 PROCEDURE — 3700000000 HC ANESTHESIA ATTENDED CARE: Performed by: SURGERY

## 2020-07-06 PROCEDURE — 2580000003 HC RX 258: Performed by: SURGERY

## 2020-07-06 PROCEDURE — 7100000000 HC PACU RECOVERY - FIRST 15 MIN: Performed by: SURGERY

## 2020-07-06 PROCEDURE — 2709999900 HC NON-CHARGEABLE SUPPLY: Performed by: SURGERY

## 2020-07-06 PROCEDURE — 15260 FTH/GFT FR N/E/E/L 20 SQCM/<: CPT | Performed by: SURGERY

## 2020-07-06 PROCEDURE — 2500000003 HC RX 250 WO HCPCS: Performed by: NURSE ANESTHETIST, CERTIFIED REGISTERED

## 2020-07-06 PROCEDURE — 6360000002 HC RX W HCPCS: Performed by: SURGERY

## 2020-07-06 PROCEDURE — 17311 MOHS 1 STAGE H/N/HF/G: CPT | Performed by: DERMATOLOGY

## 2020-07-06 PROCEDURE — 3600000002 HC SURGERY LEVEL 2 BASE: Performed by: SURGERY

## 2020-07-06 PROCEDURE — 3600000012 HC SURGERY LEVEL 2 ADDTL 15MIN: Performed by: SURGERY

## 2020-07-06 PROCEDURE — 2500000003 HC RX 250 WO HCPCS: Performed by: SURGERY

## 2020-07-06 PROCEDURE — 3700000001 HC ADD 15 MINUTES (ANESTHESIA): Performed by: SURGERY

## 2020-07-06 RX ORDER — OXYCODONE HYDROCHLORIDE AND ACETAMINOPHEN 5; 325 MG/1; MG/1
2 TABLET ORAL PRN
Status: DISCONTINUED | OUTPATIENT
Start: 2020-07-06 | End: 2020-07-06 | Stop reason: HOSPADM

## 2020-07-06 RX ORDER — FENTANYL CITRATE 50 UG/ML
25 INJECTION, SOLUTION INTRAMUSCULAR; INTRAVENOUS EVERY 5 MIN PRN
Status: DISCONTINUED | OUTPATIENT
Start: 2020-07-06 | End: 2020-07-06 | Stop reason: HOSPADM

## 2020-07-06 RX ORDER — PROCHLORPERAZINE EDISYLATE 5 MG/ML
5 INJECTION INTRAMUSCULAR; INTRAVENOUS
Status: DISCONTINUED | OUTPATIENT
Start: 2020-07-06 | End: 2020-07-06 | Stop reason: HOSPADM

## 2020-07-06 RX ORDER — GINSENG 100 MG
CAPSULE ORAL PRN
Status: DISCONTINUED | OUTPATIENT
Start: 2020-07-06 | End: 2020-07-06 | Stop reason: ALTCHOICE

## 2020-07-06 RX ORDER — OXYCODONE HYDROCHLORIDE AND ACETAMINOPHEN 5; 325 MG/1; MG/1
1 TABLET ORAL EVERY 6 HOURS PRN
Qty: 12 TABLET | Refills: 0 | Status: SHIPPED | OUTPATIENT
Start: 2020-07-06 | End: 2020-07-09

## 2020-07-06 RX ORDER — MINERAL OIL
OIL (ML) MISCELLANEOUS PRN
Status: DISCONTINUED | OUTPATIENT
Start: 2020-07-06 | End: 2020-07-06 | Stop reason: ALTCHOICE

## 2020-07-06 RX ORDER — LABETALOL 20 MG/4 ML (5 MG/ML) INTRAVENOUS SYRINGE
5 EVERY 10 MIN PRN
Status: DISCONTINUED | OUTPATIENT
Start: 2020-07-06 | End: 2020-07-06 | Stop reason: HOSPADM

## 2020-07-06 RX ORDER — HYDRALAZINE HYDROCHLORIDE 20 MG/ML
5 INJECTION INTRAMUSCULAR; INTRAVENOUS EVERY 10 MIN PRN
Status: DISCONTINUED | OUTPATIENT
Start: 2020-07-06 | End: 2020-07-06 | Stop reason: HOSPADM

## 2020-07-06 RX ORDER — OXYCODONE HYDROCHLORIDE AND ACETAMINOPHEN 5; 325 MG/1; MG/1
1 TABLET ORAL PRN
Status: DISCONTINUED | OUTPATIENT
Start: 2020-07-06 | End: 2020-07-06 | Stop reason: HOSPADM

## 2020-07-06 RX ORDER — GLYCOPYRROLATE 0.2 MG/ML
INJECTION INTRAMUSCULAR; INTRAVENOUS PRN
Status: DISCONTINUED | OUTPATIENT
Start: 2020-07-06 | End: 2020-07-06 | Stop reason: SDUPTHER

## 2020-07-06 RX ORDER — ONDANSETRON 2 MG/ML
4 INJECTION INTRAMUSCULAR; INTRAVENOUS
Status: DISCONTINUED | OUTPATIENT
Start: 2020-07-06 | End: 2020-07-06 | Stop reason: HOSPADM

## 2020-07-06 RX ORDER — ONDANSETRON 2 MG/ML
INJECTION INTRAMUSCULAR; INTRAVENOUS PRN
Status: DISCONTINUED | OUTPATIENT
Start: 2020-07-06 | End: 2020-07-06 | Stop reason: SDUPTHER

## 2020-07-06 RX ORDER — MAGNESIUM HYDROXIDE 1200 MG/15ML
LIQUID ORAL CONTINUOUS PRN
Status: COMPLETED | OUTPATIENT
Start: 2020-07-06 | End: 2020-07-06

## 2020-07-06 RX ORDER — DIPHENHYDRAMINE HYDROCHLORIDE 50 MG/ML
INJECTION INTRAMUSCULAR; INTRAVENOUS PRN
Status: DISCONTINUED | OUTPATIENT
Start: 2020-07-06 | End: 2020-07-06 | Stop reason: SDUPTHER

## 2020-07-06 RX ORDER — SUCCINYLCHOLINE CHLORIDE 20 MG/ML
INJECTION INTRAMUSCULAR; INTRAVENOUS PRN
Status: DISCONTINUED | OUTPATIENT
Start: 2020-07-06 | End: 2020-07-06 | Stop reason: SDUPTHER

## 2020-07-06 RX ORDER — ROCURONIUM BROMIDE 10 MG/ML
INJECTION, SOLUTION INTRAVENOUS PRN
Status: DISCONTINUED | OUTPATIENT
Start: 2020-07-06 | End: 2020-07-06 | Stop reason: SDUPTHER

## 2020-07-06 RX ORDER — DIPHENHYDRAMINE HYDROCHLORIDE 50 MG/ML
12.5 INJECTION INTRAMUSCULAR; INTRAVENOUS
Status: DISCONTINUED | OUTPATIENT
Start: 2020-07-06 | End: 2020-07-06 | Stop reason: HOSPADM

## 2020-07-06 RX ORDER — LIDOCAINE HYDROCHLORIDE 20 MG/ML
INJECTION, SOLUTION INFILTRATION; PERINEURAL PRN
Status: DISCONTINUED | OUTPATIENT
Start: 2020-07-06 | End: 2020-07-06 | Stop reason: SDUPTHER

## 2020-07-06 RX ORDER — LIDOCAINE HYDROCHLORIDE AND EPINEPHRINE 10; 10 MG/ML; UG/ML
INJECTION, SOLUTION INFILTRATION; PERINEURAL PRN
Status: DISCONTINUED | OUTPATIENT
Start: 2020-07-06 | End: 2020-07-06 | Stop reason: ALTCHOICE

## 2020-07-06 RX ORDER — CEPHALEXIN 500 MG/1
500 CAPSULE ORAL 4 TIMES DAILY
Qty: 28 CAPSULE | Refills: 0 | Status: SHIPPED | OUTPATIENT
Start: 2020-07-06 | End: 2020-07-13

## 2020-07-06 RX ORDER — MIDAZOLAM HYDROCHLORIDE 1 MG/ML
INJECTION INTRAMUSCULAR; INTRAVENOUS PRN
Status: DISCONTINUED | OUTPATIENT
Start: 2020-07-06 | End: 2020-07-06 | Stop reason: SDUPTHER

## 2020-07-06 RX ORDER — FENTANYL CITRATE 50 UG/ML
50 INJECTION, SOLUTION INTRAMUSCULAR; INTRAVENOUS EVERY 5 MIN PRN
Status: DISCONTINUED | OUTPATIENT
Start: 2020-07-06 | End: 2020-07-06 | Stop reason: HOSPADM

## 2020-07-06 RX ORDER — FENTANYL CITRATE 50 UG/ML
INJECTION, SOLUTION INTRAMUSCULAR; INTRAVENOUS PRN
Status: DISCONTINUED | OUTPATIENT
Start: 2020-07-06 | End: 2020-07-06 | Stop reason: SDUPTHER

## 2020-07-06 RX ORDER — PROPOFOL 10 MG/ML
INJECTION, EMULSION INTRAVENOUS PRN
Status: DISCONTINUED | OUTPATIENT
Start: 2020-07-06 | End: 2020-07-06 | Stop reason: SDUPTHER

## 2020-07-06 RX ORDER — MEPERIDINE HYDROCHLORIDE 25 MG/ML
12.5 INJECTION INTRAMUSCULAR; INTRAVENOUS; SUBCUTANEOUS EVERY 5 MIN PRN
Status: DISCONTINUED | OUTPATIENT
Start: 2020-07-06 | End: 2020-07-06 | Stop reason: HOSPADM

## 2020-07-06 RX ORDER — SODIUM CHLORIDE, SODIUM LACTATE, POTASSIUM CHLORIDE, CALCIUM CHLORIDE 600; 310; 30; 20 MG/100ML; MG/100ML; MG/100ML; MG/100ML
INJECTION, SOLUTION INTRAVENOUS CONTINUOUS
Status: DISCONTINUED | OUTPATIENT
Start: 2020-07-06 | End: 2020-07-06 | Stop reason: HOSPADM

## 2020-07-06 RX ADMIN — GLYCOPYRROLATE 0.4 MG: 0.2 INJECTION INTRAMUSCULAR; INTRAVENOUS at 17:29

## 2020-07-06 RX ADMIN — LIDOCAINE HYDROCHLORIDE 100 MG: 20 INJECTION, SOLUTION INFILTRATION; PERINEURAL at 16:09

## 2020-07-06 RX ADMIN — GLYCOPYRROLATE 0.3 MG: 0.2 INJECTION INTRAMUSCULAR; INTRAVENOUS at 16:22

## 2020-07-06 RX ADMIN — SODIUM CHLORIDE, SODIUM LACTATE, POTASSIUM CHLORIDE, AND CALCIUM CHLORIDE: 600; 310; 30; 20 INJECTION, SOLUTION INTRAVENOUS at 15:49

## 2020-07-06 RX ADMIN — ONDANSETRON 4 MG: 2 INJECTION INTRAMUSCULAR; INTRAVENOUS at 16:09

## 2020-07-06 RX ADMIN — PROPOFOL 100 MG: 10 INJECTION, EMULSION INTRAVENOUS at 16:09

## 2020-07-06 RX ADMIN — SUCCINYLCHOLINE CHLORIDE 120 MG: 20 INJECTION, SOLUTION INTRAMUSCULAR; INTRAVENOUS; PARENTERAL at 16:11

## 2020-07-06 RX ADMIN — SODIUM CHLORIDE, SODIUM LACTATE, POTASSIUM CHLORIDE, AND CALCIUM CHLORIDE: 600; 310; 30; 20 INJECTION, SOLUTION INTRAVENOUS at 17:26

## 2020-07-06 RX ADMIN — PROPOFOL 50 MG: 10 INJECTION, EMULSION INTRAVENOUS at 16:11

## 2020-07-06 RX ADMIN — PHENYLEPHRINE HYDROCHLORIDE 120 MCG: 10 INJECTION, SOLUTION INTRAMUSCULAR; INTRAVENOUS; SUBCUTANEOUS at 16:21

## 2020-07-06 RX ADMIN — FENTANYL CITRATE 30 MCG: 50 INJECTION INTRAMUSCULAR; INTRAVENOUS at 16:03

## 2020-07-06 RX ADMIN — ROCURONIUM BROMIDE 20 MG: 10 INJECTION, SOLUTION INTRAVENOUS at 16:57

## 2020-07-06 RX ADMIN — PROPOFOL 30 MG: 10 INJECTION, EMULSION INTRAVENOUS at 16:57

## 2020-07-06 RX ADMIN — FENTANYL CITRATE 20 MCG: 50 INJECTION INTRAMUSCULAR; INTRAVENOUS at 16:59

## 2020-07-06 RX ADMIN — DIPHENHYDRAMINE HYDROCHLORIDE 12.5 MG: 50 INJECTION, SOLUTION INTRAMUSCULAR; INTRAVENOUS at 17:26

## 2020-07-06 RX ADMIN — NEOSTIGMINE METHYLSULFATE 3 MG: 1 INJECTION INTRAMUSCULAR; INTRAVENOUS; SUBCUTANEOUS at 17:29

## 2020-07-06 RX ADMIN — ROCURONIUM BROMIDE 50 MG: 10 INJECTION, SOLUTION INTRAVENOUS at 16:22

## 2020-07-06 RX ADMIN — MIDAZOLAM HYDROCHLORIDE 2 MG: 2 INJECTION, SOLUTION INTRAMUSCULAR; INTRAVENOUS at 16:03

## 2020-07-06 RX ADMIN — CEFAZOLIN SODIUM 2 G: 1 POWDER, FOR SOLUTION INTRAMUSCULAR; INTRAVENOUS at 16:22

## 2020-07-06 ASSESSMENT — PULMONARY FUNCTION TESTS
PIF_VALUE: 21
PIF_VALUE: 24
PIF_VALUE: 22
PIF_VALUE: 4
PIF_VALUE: 20
PIF_VALUE: 33
PIF_VALUE: 20
PIF_VALUE: 21
PIF_VALUE: 23
PIF_VALUE: 24
PIF_VALUE: 21
PIF_VALUE: 23
PIF_VALUE: 24
PIF_VALUE: 20
PIF_VALUE: 21
PIF_VALUE: 24
PIF_VALUE: 20
PIF_VALUE: 13
PIF_VALUE: 20
PIF_VALUE: 1
PIF_VALUE: 21
PIF_VALUE: 20
PIF_VALUE: 21
PIF_VALUE: 21
PIF_VALUE: 24
PIF_VALUE: 21
PIF_VALUE: 24
PIF_VALUE: 9
PIF_VALUE: 21
PIF_VALUE: 1
PIF_VALUE: 23
PIF_VALUE: 20
PIF_VALUE: 21
PIF_VALUE: 21
PIF_VALUE: 9
PIF_VALUE: 20
PIF_VALUE: 13
PIF_VALUE: 21
PIF_VALUE: 20
PIF_VALUE: 2
PIF_VALUE: 21
PIF_VALUE: 22
PIF_VALUE: 0
PIF_VALUE: 20
PIF_VALUE: 27
PIF_VALUE: 21
PIF_VALUE: 21
PIF_VALUE: 24
PIF_VALUE: 23
PIF_VALUE: 20
PIF_VALUE: 23
PIF_VALUE: 14
PIF_VALUE: 24
PIF_VALUE: 20
PIF_VALUE: 21
PIF_VALUE: 21
PIF_VALUE: 20
PIF_VALUE: 20
PIF_VALUE: 21
PIF_VALUE: 20
PIF_VALUE: 23
PIF_VALUE: 21
PIF_VALUE: 26
PIF_VALUE: 21
PIF_VALUE: 23
PIF_VALUE: 13
PIF_VALUE: 20
PIF_VALUE: 1
PIF_VALUE: 20
PIF_VALUE: 1
PIF_VALUE: 20
PIF_VALUE: 21
PIF_VALUE: 21
PIF_VALUE: 23
PIF_VALUE: 21
PIF_VALUE: 20
PIF_VALUE: 1
PIF_VALUE: 1
PIF_VALUE: 23
PIF_VALUE: 21
PIF_VALUE: 21
PIF_VALUE: 20
PIF_VALUE: 21
PIF_VALUE: 20
PIF_VALUE: 22
PIF_VALUE: 24
PIF_VALUE: 24
PIF_VALUE: 23
PIF_VALUE: 21
PIF_VALUE: 15
PIF_VALUE: 20
PIF_VALUE: 20
PIF_VALUE: 23
PIF_VALUE: 21
PIF_VALUE: 21
PIF_VALUE: 23
PIF_VALUE: 21
PIF_VALUE: 20
PIF_VALUE: 23

## 2020-07-06 ASSESSMENT — PAIN - FUNCTIONAL ASSESSMENT: PAIN_FUNCTIONAL_ASSESSMENT: 0-10

## 2020-07-06 ASSESSMENT — LIFESTYLE VARIABLES: SMOKING_STATUS: 1

## 2020-07-06 ASSESSMENT — PAIN SCALES - GENERAL: PAINLEVEL_OUTOF10: 0

## 2020-07-06 ASSESSMENT — PAIN DESCRIPTION - DESCRIPTORS: DESCRIPTORS: ACHING

## 2020-07-06 NOTE — PROGRESS NOTES
MOHS PROCEDURE NOTE    PHYSICIAN:  [facility]. Maki Jhavrei MD    ASSISTANT: Neeraj Concepcion RN and Felipe Begum RN     REFERRING PROVIDER:   Jocelyn Wright MD, Ph.D.    PREOPERATIVE DIAGNOSIS: Nodular Basal Cell Carcinoma, ulcerated    SPECIFIC MOHS INDICATIONS:  location    AUC SCORIN/9    POSTOPERATIVE DIAGNOSIS: SAME    LOCATION: Left supra tip    OPERATIVE PROCEDURE:  MOHS MICROGRAPHIC SURGERY    RECONSTRUCTION OF DEFECT: Dr. Alfredo Tracy, plastic surgery, to perform repair as arranged    PREOPERATIVE SIZE: 8x7 MM    DEFECT SIZE: 22x20 MM    LENGTH OF REPAIRED WOUND/SIZE OF FLAP/SIZE OF GRAFT:  N/A    ANESTHESIA: 7 mL 1% lidocaine with epinephrine 1:100,000 buffered. EBL:  MINIMAL    DURATION OF PROCEDURE:  4.25 HOURS    POSTOPERATIVE OBSERVATION: 0.75 HOUR    SPECIMENS:  SEE MOHS MAP    COMPLICATIONS:  NONE    DESCRIPTION OF PROCEDURE:  The patient was given a mirror, as appropriate, and the biopsy site was identified, marked with a surgical marking pen, and verified by the patient. Options for treatment were discussed and the patient was informed that Mohs surgery was the selected treatment based on its lower recurrence rate, given the features listed above, as compared to other treatment modalities such as excision, radiation, or curettage, and agreed with this treatment plan. Risks and benefits including bruising, swelling, bleeding, infection, nerve injury, recurrence, and scarring were discussed with the patient prior to the procedure and a written consent detailing these and other risks was reviewed with the patient and signed. There was a time out for person and procedure verification. The surgical site was prepped with an antiseptic solution. Application of an antiseptic solution was repeated before each surgical stage. Stage I:  The clinically-apparent tumor was carefully defined and debulked, determining the edge of the surgical excision.     A thin layer of tumor-laden tissue was excised with a narrow margin of normal-appearing skin, using the technique of Mohs. A map was prepared to correspond to the area of skin from which it was excised. Hemostasis was achieved using electrosurgery. The wound was bandaged. The tissue was prepared for the cryostat and sectioned. 1 section(s) prepared. Each section was coded, cut, and stained for microscopic examination. The entire base and margins of the excised piece of tissue were examined by the surgeon. Stage I, II and III:  Nodular BCC: large basaloid lobules of varying shape and size with peripheral palisading present around the rim of the lobule, with retraction of the tumor lobules from their associated stroma and Superficial BCC: isolated basaloid lobules projecting from the lower margin of the epidermis. The remaining tumor was noted and the next stage was performed. Stage II, III and IV:  A thin layer of tissue was removed at the histologically-identified sites of remaining tumor. The entire procedure as described in stage I was repeated to process the tissue according to Mohs technique. 1 section(s) prepared for stage II, III and IV. No tumor was identified at the peripheral margins of stage IV of microscopically controlled surgery. DEFECT MANAGEMENT:    REPAIR DESCRIPTION:    As had been arranged, the patient will be seen by Dr. Barbara Dale, plastic surgery, for repair. WOUND COVERAGE:  The wound was cleaned with normal saline solution, dried off, Aquaphor ointment was applied, and the wound was covered. A dressing was applied for stabilization and light pressure. The patient was given detailed oral and written instructions on postoperative care. There were no complications. The patient left the Unit in good medical condition. FOLLOW-UP:  Follow up as needed.

## 2020-07-06 NOTE — PROGRESS NOTES
MOHS PROCEDURE NOTE    PHYSICIAN:  Kenny Kulkarni. Minnie Conn MD    ASSISTANT: Na Orr RN and Jing Duenas RN     REFERRING PROVIDER:   Jef Monteiro MD, Ph.D.    PREOPERATIVE DIAGNOSIS: Nodular Basal Cell Carcinoma and Micronodular Basal Cell Carcinoma     SPECIFIC MOHS INDICATIONS:  location and aggressive histologic growth pattern    AUC SCORIN/9    POSTOPERATIVE DIAGNOSIS: SAME    LOCATION: Right philtrum    OPERATIVE PROCEDURE:  MOHS MICROGRAPHIC SURGERY    RECONSTRUCTION OF DEFECT: Dr. Barbara Dale, plastic surgery, to perform repair as arranged    PREOPERATIVE SIZE: 6x6 MM    DEFECT SIZE: 29x23 MM    LENGTH OF REPAIRED WOUND/SIZE OF FLAP/SIZE OF GRAFT:  N/A    ANESTHESIA: 6.5 mL 1% lidocaine with epinephrine 1:100,000 buffered. EBL:  MINIMAL    DURATION OF PROCEDURE:  4.25 HOURS    POSTOPERATIVE OBSERVATION: 0.75 HOUR    SPECIMENS:  SEE MOHS MAP    COMPLICATIONS:  NONE    DESCRIPTION OF PROCEDURE:  The patient was given a mirror, as appropriate, and the biopsy site was identified, marked with a surgical marking pen, and verified by the patient. Options for treatment were discussed and the patient was informed that Mohs surgery was the selected treatment based on its lower recurrence rate, given the features listed above, as compared to other treatment modalities such as excision, radiation, or curettage, and agreed with this treatment plan. Risks and benefits including bruising, swelling, bleeding, infection, nerve injury, recurrence, and scarring were discussed with the patient prior to the procedure and a written consent detailing these and other risks was reviewed with the patient and signed. There was a time out for person and procedure verification. The surgical site was prepped with an antiseptic solution. Application of an antiseptic solution was repeated before each surgical stage.       Stage I:  The clinically-apparent tumor was carefully defined and debulked, determining the edge of the surgical excision. A thin layer of tumor-laden tissue was excised with a narrow margin of normal-appearing skin, using the technique of Mohs. A map was prepared to correspond to the area of skin from which it was excised. Hemostasis was achieved using electrosurgery. The wound was bandaged. The tissue was prepared for the cryostat and sectioned. 1 section(s) prepared. Each section was coded, cut, and stained for microscopic examination. The entire base and margins of the excised piece of tissue were examined by the surgeon. Stage I, II, III, IV and V:  Nodular BCC: large basaloid lobules of varying shape and size with peripheral palisading present around the rim of the lobule, with retraction of the tumor lobules from their associated stroma. The remaining tumor was noted and the next stage was performed. Stage II, III, IV, V and VI:  A thin layer of tissue was removed at the histologically-identified sites of remaining tumor. The entire procedure as described in stage I was repeated to process the tissue according to Mohs technique. 1 section(s) prepared for stage II, III, IV, V and VI. No tumor was identified at the peripheral margins of stage VI of microscopically controlled surgery. DEFECT MANAGEMENT:    REPAIR DESCRIPTION:    As had been arranged, the patient will be seen by Dr. Jane Sarah, plastic surgery, for repair. WOUND COVERAGE:  The wound was cleaned with normal saline solution, dried off, Aquaphor ointment was applied, and the wound was covered. A dressing was applied for stabilization and light pressure. The patient was given detailed oral and written instructions on postoperative care. There were no complications. The patient left the Unit in good medical condition. FOLLOW-UP:  Follow up as needed.

## 2020-07-06 NOTE — PROGRESS NOTES
PRE-PROCEDURE SCREENING    Pacemaker/ICD: No  Difficulty with numbing in the past: No  Local Anesthesia Reaction/passing out: No  Latex or adhesive allergy:  No  Bleeding/Clotting Disorders: No  Anticoagulant Therapy: Yes, eliquis 5 mg  Joint prosthesis: No  Artificial Heart Valve: No  Stroke or Seizures: No  Organ Transplant or Lymphoma: No  Immunosuppression: No  Respiratory Problems: No

## 2020-07-06 NOTE — PATIENT INSTRUCTIONS
[facility] Mohs Surgery Office Hours:    Monday-Thursday  7:30 AM-4:30 PM    Friday  9:00 AM-1:00 PM

## 2020-07-06 NOTE — ANESTHESIA PRE PROCEDURE
Fermin Allan MD           Allergies: Allergies   Allergen Reactions    Iodine      **IVP DYES**       Problem List:    Patient Active Problem List   Diagnosis Code    Benign non-nodular prostatic hyperplasia without lower urinary tract symptoms N40.0    Aortic valve disorder I35.9    Hyperlipidemia E78.5    Ulcerative colitis ([address] K51.90    Hyperparathyroidism ([address] E21.3    Routine general medical examination at a health care facility Z00.00    CAD (coronary artery disease) I25.10    Ventral hernia K43.9    Slow transit constipation K59.01    Gross hematuria R31.0    Basal cell carcinoma, face C44.310    Sacral back pain M53.3    Leg edema, left R60.0    Permanent atrial fibrillation I48.21       Past Medical History:        Diagnosis Date    Aortic regurgitation     Atrial fibrillation ([address]     BPH     Hyperlipidemia     Hypertension     Superficial phlebitis     Ulcerative colitis        Past Surgical History:        Procedure Laterality Date    COLONOSCOPY  10/2011    COLONOSCOPY  09/20/2013    COLONOSCOPY  10/16/2015    COLONOSCOPY N/A 10/11/2019    COLONOSCOPY CONTROL HEMORRHAGE performed by Deb Alonzo MD at [address] [place] [address]    MOHS SURGERY  07/25/2018    Left lower lip, left cheek, right cheek    PARATHYROIDECTOMY      SALIVARY GLAND SURGERY      parotid       Social History:    Social History     Tobacco Use    Smoking status: Current Some Day Smoker     Years: 38.00     Types: Cigars    Smokeless tobacco: Never Used   Substance Use Topics    Alcohol use:  Yes     Alcohol/week: 0.0 standard drinks     Comment: socially                                Ready to quit: Not Answered  Counseling given: Not Answered      Vital Signs (Current):   Vitals:    07/06/20 1429   BP: (!) 153/89   Pulse: 58   Resp: 14   Temp: 36.6 °C (97.8 °F)   TempSrc: Oral   SpO2: 96%   Weight: 213 lb (96.6 kg)   Height: 5' 10\" (1.778 m) BP Readings from Last 3 Encounters:   07/06/20 (!) 153/89   06/29/20 120/74   06/17/20 131/78       NPO Status: Time of last liquid consumption: 0700                        Time of last solid consumption: 0700                        Date of last liquid consumption: 07/06/20                        Date of last solid food consumption: 07/06/20    BMI:   Wt Readings from Last 3 Encounters:   07/06/20 213 lb (96.6 kg)   06/29/20 219 lb (99.3 kg)   06/17/20 218 lb (98.9 kg)     Body mass index is 30.56 kg/m². CBC:   Lab Results   Component Value Date    WBC 6.3 09/03/2019    RBC 3.91 09/03/2019    HGB 13.9 09/03/2019    HCT 41.0 09/03/2019    .1 09/03/2019    RDW 16.7 09/03/2019     09/03/2019       CMP:   Lab Results   Component Value Date     09/03/2019    K 4.4 09/03/2019     09/03/2019    CO2 29 09/03/2019    BUN 24 09/03/2019    CREATININE 1.0 09/03/2019    GFRAA >60 09/03/2019    GFRAA >60 07/18/2012    AGRATIO 2.2 09/03/2019    LABGLOM >60 09/03/2019    GLUCOSE 66 09/03/2019    GLUCOSE 99 09/30/2011    PROT 6.7 09/03/2019    PROT 6.6 07/18/2012    CALCIUM 9.2 09/03/2019    BILITOT 2.1 09/03/2019    ALKPHOS 70 09/03/2019    AST 21 09/03/2019    ALT 15 09/03/2019       POC Tests: No results for input(s): POCGLU, POCNA, POCK, POCCL, POCBUN, POCHEMO, POCHCT in the last 72 hours.     Coags: No results found for: PROTIME, INR, APTT    HCG (If Applicable): No results found for: PREGTESTUR, PREGSERUM, HCG, HCGQUANT     ABGs: No results found for: PHART, PO2ART, FWT2XVY, LGN6CIS, BEART, W4LQROGW     Type & Screen (If Applicable):  No results found for: LABABO, LABRH    Drug/Infectious Status (If Applicable):  No results found for: HIV, HEPCAB    COVID-19 Screening (If Applicable):   Lab Results   Component Value Date    COVID19 Not Detected 06/30/2020         Anesthesia Evaluation   no history of anesthetic complications:   Airway: Mallampati: II  TM distance: >3 FB   Neck ROM: full  Mouth opening: > = 3 FB Dental: normal exam         Pulmonary:normal exam    (+) current smoker (cigars once or twice a week)          Patient smoked on day of surgery. Cardiovascular:  Exercise tolerance: good (>4 METS),   (+) hypertension: moderate, valvular problems/murmurs (aortic regurge):, CAD:, dysrhythmias: atrial fibrillation,       ECG reviewed  Rhythm: regular  Rate: normal                    Neuro/Psych:   Negative Neuro/Psych ROS              GI/Hepatic/Renal:        (-) hiatal hernia, GERD, PUD (ulcerative colitis, no blood in stool. see GI doc and has been having regular colonoscopys), hepatitis, liver disease, bowel prep and no morbid obesity       Endo/Other:    (+) malignancy/cancer (history of skin cancer on face only). Pt had no PAT visit       Abdominal:       Abdomen: soft. Vascular:   + PVD, aortic or cerebral, .  - DVT and PE. Anesthesia Plan      general     ASA 3     (-npo 8am. Blueberries, cereal/milk, orange juice  -denies chest pain. H/o afib and aortic regurgitation    Echo 2019  Summary:  The left ventricular wall motion is normal.  There is mild mitral annular calcification. The Aortic Valve is mildly calcified. Aortic Valve leaflets appear thickened. Overall left ventricular ejection fraction is estimated to be 55-60%. There is moderate asymmetric left ventricular hypertrophy. Mild to moderate aortic regurgitation.)  Induction: inhalational and intravenous. MIPS: Postoperative opioids intended and Prophylactic antiemetics administered. Anesthetic plan and risks discussed with patient. Use of blood products discussed with patient whom. Plan discussed with CRNA and attending.                 Erich Payne, LIZZ - CRNA   7/6/2020

## 2020-07-06 NOTE — H&P
Marilu Glynn    [id number]    [facility] Same Day Surgery Update H & P  Department of General Surgery   Surgical Service   Pre-operative History and Physical  Last H & P within the last 30 days. DIAGNOSIS:   Mohs defect [M95.9]    PROCEDURE:  DE ADJ TISS XFER HEAD,FAC,HAND <10SQCM [43323] (MULTIPLE FACIAL MOHS RECONSTRUCTION WITH CLOSURE, POSSIBLE ADJACENT TISSUE TRANSFER, POSSIBLE FULL THICKNESS SKIN GRAFT)      HISTORY OF PRESENT ILLNESS:   Patient with nose and lip BCC underwent Mohs excision today with Dr. Janee Valera. He presents today for reconstruction. Covid 19:  Patient denies fever, chills, cough or known exposure to Covid-19.   Patient reports they have been quarantined at home since Covid-19 test.      Past Medical History:        Diagnosis Date    Aortic regurgitation     Atrial fibrillation ([address]     BPH     Hyperlipidemia     Hypertension     Superficial phlebitis     Ulcerative colitis      Past Surgical History:        Procedure Laterality Date    COLONOSCOPY  10/2011    COLONOSCOPY  09/20/2013    COLONOSCOPY  10/16/2015    COLONOSCOPY N/A 10/11/2019    COLONOSCOPY CONTROL HEMORRHAGE performed by Demond Arellano MD at [facility]      inguinal    MOHS SURGERY  07/25/2018    Left lower lip, left cheek, right cheek    PARATHYROIDECTOMY      SALIVARY GLAND SURGERY      parotid     Past Social History:  Social History     Socioeconomic History    Marital status:      Spouse name: Not on file    Number of children: 3    Years of education: Not on file    Highest education level: Not on file   Occupational History    Occupation: sold [employer]     Comment: retired   Social Needs    Financial resource strain: Not on file    Food insecurity     Worry: Not on file     Inability: Not on file   [employer] needs     Medical: Not on file     Non-medical: Not on file   Tobacco Use    Smoking status: Current Some Day Smoker     Years: 38.00     Types: Cigars    Smokeless tobacco: Never Used   Substance and Sexual Activity    Alcohol use: Yes     Alcohol/week: 0.0 standard drinks     Comment: socially    Drug use: No    Sexual activity: Not on file   Lifestyle    Physical activity     Days per week: Not on file     Minutes per session: Not on file    Stress: Not on file   Relationships    Social connections     Talks on phone: Not on file     Gets together: Not on file     Attends [employer] service: Not on file     Active member of club or organization: Not on file     Attends meetings of clubs or organizations: Not on file     Relationship status: Not on file    Intimate partner violence     Fear of current or ex partner: Not on file     Emotionally abused: Not on file     Physically abused: Not on file     Forced sexual activity: Not on file   Other Topics Concern    Not on file   Social History Narrative    Not on file         Medications Prior to Admission:      Prior to Admission medications    Medication Sig Start Date End Date Taking? Authorizing Provider   carvedilol (COREG) 12.5 MG tablet Take 1 tablet by mouth 2 times daily (with meals) 4/16/20   Tarun Alberts MD   lisinopril (PRINIVIL;ZESTRIL) 10 MG tablet Take 1 tablet by mouth daily 4/16/20   Tarun Alberts MD   apixaban Patmatthew Crespo) 5 MG TABS tablet Take 1 tablet by mouth 2 times daily 4/16/20   Tarun Alberts MD   dilTIAZem (CARDIZEM CD) 180 MG extended release capsule Take 1 capsule by mouth daily 4/16/20   Tarun Alberts MD   Vitamin D (CHOLECALCIFEROL) 1000 UNITS CAPS capsule Take 1 capsule by mouth daily 11/3/15   Tarun Alberts MD   sildenafil (VIAGRA) 100 MG tablet Take 1 tablet by mouth as needed for Erectile Dysfunction 11/3/15   Tarun Alberts MD   simvastatin (ZOCOR) 20 MG tablet TAKE 1 TABLET IN THE EVENING 10/28/13   Tarun Alberts MD   latanoprost (XALATAN) 0.005 % ophthalmic solution Place 1 drop into both eyes nightly. Historical Provider, MD   aspirin EC 81 MG EC tablet Take 1 tablet by mouth daily. 11/15/10   Jessica Ruiz MD   mercaptopurine (PURINETHOL) 50 MG tablet Take 50 mg by mouth daily. Historical Provider, MD         Allergies:  Iodine    PHYSICAL EXAM:      BP (!) 153/89   Pulse 58   Temp 97.8 °F (36.6 °C) (Oral)   Resp 14   Ht 5' 10\" (1.778 m)   Wt 213 lb (96.6 kg)   SpO2 96%   BMI 30.56 kg/m²      Airway:  Airway patent with no audible stridor    Heart:  regular rate and rhythm, No murmur noted    Lungs:  No increased work of breathing, good air exchange, clear to auscultation bilaterally, no crackles or wheezing    Abdomen:  soft, non-distended, non-tender, no rebound tenderness or guarding, normal active bowel sounds and no masses palpated    ASSESSMENT AND PLAN     Patient is a 80 y.o. male with above specified procedure planned. 1.  Patient seen and focused exam done today- no new changes since last physical exam on 6/29/20     2. Access to ancillary services are available per request of the provider.     LIZZ Roach - CNP     7/6/2020

## 2020-07-06 NOTE — BRIEF OP NOTE
Brief Postoperative Note      Patient: Elsi Jimenes  YOB: 1934  MRN: [id number]    Date of Procedure: 7/6/2020    Pre-Op Diagnosis: Mohs defect [M95.9]    Post-Op Diagnosis: Same       Procedure(s):  FULL THICKNESS SKIN GRAFT TO THE NOSE (2.5 X 2 CM), ROTATIONAL ADVANCEMENT FLAP (3X4.2 CM) TO THE LIP (3X2 CM)    Surgeon(s):  Brynn Azul MD    Assistant:  Resident: Meme Chan MD    Anesthesia: Local    Estimated Blood Loss (mL): Minimal    Complications: None    Specimens:   * No specimens in log *    Implants:  * No implants in log *      Drains: * No LDAs found *    Findings: see op note    Electronically signed by Meme Chan MD on 7/6/2020 at 5:32 PM

## 2020-07-06 NOTE — OP NOTE
[place] [address] SURGERY     OPERATIVE DICTATION    NAME: Marilu Glynn   MRN: [id number]  DATE: 7/6/2020    AGE: 80 y.o. LOCATION: [address] DIAGNOSIS:  Nasal and lip Mohs defects. POSTOPERATIVE DIAGNOSIS:  Same. OPERATION PERFORMED: 1) Full thickness skin graft to the nasal Mohs defect (2.5 x 2 cm)      2) Rotation advancement flap for right upper lip reconstruction (4.2 x 3 cm)     SURGEON:  Gene Mocha, MD ASSISTANTAnner Leventhal ([id number])     ANESTHESIA: General     ESTIMATED BLOOD LOSS:  Minimal     DRAINS:  None     SPECIMENS: None from plastics     OPERATIVE INDICATIONS:  This is a 80 y.o. male who presented with Mohs defects of the nose and upper lip. He has a history of multiple previous Mohs procedures. Given the defect size and location, plastic surgery was consulted for assistance with reconstruction. A discussion regarding the risks, benefits, alternatives, outcomes, and personnel involved was performed with the patient. After all questions were answered to the patient's satisfaction, they agreed to proceed. OPERATIVE PROCEDURE:  The patient was marked in the preoperative holding area and then brought to the operating room and placed in the supine position on the operating room table. After undergoing general anesthesia without difficulty, he was prepped and draped in the usual sterile manner. A time-out was performed confirming the patient and the procedure to be performed. The operation commenced by infiltration of local using a 50:50 mixture of 1% lidocaine with epinephrine into the face as well as the right supraclavicular area. A full thickness skin graft was then harvested from the right supraclavicular area. The graft was defatted on the backtable, perforated, and then sutured into position using 4-0 Chromic sutures.   The donor site was widely undermined and closed in layers using 3-0 Monocryl followed by a running 4-0 Chromic suture. Attention was then directed toward the large lip defect. The lip was  from the orbicularis oris to allow for advancement and coverage. An incision was then performed extending from the defect, along the nasal border including the nasolabial fold. This allowed for rotation and advancement of the skin to cover the defect and meet up with the advanced lip. The lip was sutured into position using 4-0 Chromic sutures. The skin was approximated using interrupted, horizontal mattress 4-0 and 5-0 Prolene sutures. There was good apposition at the end. The lip was swollen - from the local - but was soft without evidence of hematoma. The patient was then awakened and taken to the PACU in stable condition. There were no immediate complications and the patient tolerated the procedure well. At the end of the case, all counts were correct.     Moriah Fontenot MD

## 2020-07-07 ENCOUNTER — HOSPITAL ENCOUNTER (EMERGENCY)
Age: 85
Discharge: HOME | End: 2020-07-07
Attending: EMERGENCY MEDICINE
Payer: MEDICARE

## 2020-07-07 VITALS
OXYGEN SATURATION: 98 % | HEART RATE: 78 BPM | SYSTOLIC BLOOD PRESSURE: 141 MMHG | RESPIRATION RATE: 18 BRPM | DIASTOLIC BLOOD PRESSURE: 92 MMHG | TEMPERATURE: 98.3 F

## 2020-07-07 PROCEDURE — 99282 EMERGENCY DEPT VISIT SF MDM: CPT

## 2020-07-07 PROCEDURE — 6370000000 HC RX 637 (ALT 250 FOR IP): Performed by: STUDENT IN AN ORGANIZED HEALTH CARE EDUCATION/TRAINING PROGRAM

## 2020-07-07 PROCEDURE — 51702 INSERT TEMP BLADDER CATH: CPT

## 2020-07-07 RX ORDER — TAMSULOSIN HYDROCHLORIDE 0.4 MG/1
0.4 CAPSULE ORAL DAILY
Qty: 5 CAPSULE | Refills: 0 | Status: SHIPPED | OUTPATIENT
Start: 2020-07-08 | End: 2022-07-27

## 2020-07-07 RX ORDER — TAMSULOSIN HYDROCHLORIDE 0.4 MG/1
0.4 CAPSULE ORAL ONCE
Status: COMPLETED | OUTPATIENT
Start: 2020-07-07 | End: 2020-07-07

## 2020-07-07 RX ADMIN — TAMSULOSIN HYDROCHLORIDE 0.4 MG: 0.4 CAPSULE ORAL at 00:30

## 2020-07-07 ASSESSMENT — ENCOUNTER SYMPTOMS
ABDOMINAL PAIN: 1
RECTAL PAIN: 0
BACK PAIN: 0
NAUSEA: 0
BLOOD IN STOOL: 0
VOMITING: 0
PHOTOPHOBIA: 0
APNEA: 0

## 2020-07-07 NOTE — ED PROVIDER NOTES
[address] [facility] RESIDENT NOTE       Date of evaluation: 7/7/2020    Chief Complaint     Urinary Retention      History of Present Illness     Virginia Ramey is a 80 y.o. male history of atrial fibrillation, hypertension, hyperlipidemia who presents today for evaluation of urinary retention. Patient states that he had some moles surgery today for some basal cell carcinoma and had general anesthesia for that surgery. He states that since going home he has not been able to urinate and that he has been having increased bladder distention. He denies any prior episodes of difficulty urinating, dysuria, hematuria. He has not had any problems with his bowel movements. Review of Systems     Review of Systems   HENT: Negative for congestion and ear discharge. Eyes: Negative for photophobia and visual disturbance. Respiratory: Negative for apnea. Cardiovascular: Negative for chest pain. Gastrointestinal: Positive for abdominal pain. Negative for blood in stool, nausea, rectal pain and vomiting. Endocrine: Negative for polydipsia. Genitourinary: Positive for difficulty urinating. Musculoskeletal: Negative for back pain. Skin: Negative for pallor. Neurological: Negative for dizziness and syncope. Hematological: Negative for adenopathy. All other systems reviewed and are negative. Past Medical, Surgical, Family, and Social History     He has a past medical history of Aortic regurgitation, Atrial fibrillation ([address] BPH, Hyperlipidemia, Hypertension, Superficial phlebitis, and Ulcerative colitis. He has a past surgical history that includes parathyroidectomy; Salivary gland surgery; hernia repair; Colonoscopy (10/2011); Colonoscopy (09/20/2013); Colonoscopy (10/16/2015); Mohs surgery (07/25/2018); and Colonoscopy (N/A, 10/11/2019). His family history includes Kidney Disease in his father; Other in his mother.   He reports that he has been smoking cigars. He has smoked for the past 38.00 years. He has never used smokeless tobacco. He reports current alcohol use. He reports that he does not use drugs. Medications     Previous Medications    APIXABAN (ELIQUIS) 5 MG TABS TABLET    Take 1 tablet by mouth 2 times daily    ASPIRIN EC 81 MG EC TABLET    Take 1 tablet by mouth daily. CARVEDILOL (COREG) 12.5 MG TABLET    Take 1 tablet by mouth 2 times daily (with meals)    CEPHALEXIN (KEFLEX) 500 MG CAPSULE    Take 1 capsule by mouth 4 times daily for 7 days    DILTIAZEM (CARDIZEM CD) 180 MG EXTENDED RELEASE CAPSULE    Take 1 capsule by mouth daily    LATANOPROST (XALATAN) 0.005 % OPHTHALMIC SOLUTION    Place 1 drop into both eyes nightly. LISINOPRIL (PRINIVIL;ZESTRIL) 10 MG TABLET    Take 1 tablet by mouth daily    MERCAPTOPURINE (PURINETHOL) 50 MG TABLET    Take 50 mg by mouth daily. OXYCODONE-ACETAMINOPHEN (PERCOCET) 5-325 MG PER TABLET    Take 1 tablet by mouth every 6 hours as needed for Pain for up to 3 days. SILDENAFIL (VIAGRA) 100 MG TABLET    Take 1 tablet by mouth as needed for Erectile Dysfunction    SIMVASTATIN (ZOCOR) 20 MG TABLET    TAKE 1 TABLET IN THE EVENING    VITAMIN D (CHOLECALCIFEROL) 1000 UNITS CAPS CAPSULE    Take 1 capsule by mouth daily       Allergies     He is allergic to iodine. Physical Exam     INITIAL VITALS: BP: (!) 201/100, Temp: 98.3 °F (36.8 °C), Pulse: 78, Resp: 18, SpO2: 98 %   Physical Exam  Vitals signs reviewed. Constitutional:       Appearance: He is well-developed. Comments: Appears significantly uncomfortable   HENT:      Head: Normocephalic and atraumatic. Right Ear: Tympanic membrane normal.      Left Ear: Tympanic membrane normal.      Nose:      Comments: Dry blood from surgery earlier today. There is packing noted to the left nares. There is no evidence of surrounding cellulitis. Mouth/Throat:      Mouth: Mucous membranes are moist.      Pharynx: Oropharynx is clear.    Eyes: Pupils: Pupils are equal, round, and reactive to light. Neck:      Musculoskeletal: Neck supple. Cardiovascular:      Rate and Rhythm: Normal rate and regular rhythm. Heart sounds: Normal heart sounds. Pulmonary:      Effort: Pulmonary effort is normal.      Breath sounds: Normal breath sounds. Abdominal:      General: Bowel sounds are normal. There is distension. Palpations: Abdomen is soft. Tenderness: There is abdominal tenderness (in the suprapubic region). Genitourinary:     Penis: Normal.       Scrotum/Testes: Normal.   Musculoskeletal: Normal range of motion. General: No tenderness. Skin:     General: Skin is warm and dry. Capillary Refill: Capillary refill takes less than 2 seconds. Neurological:      General: No focal deficit present. Mental Status: He is alert and oriented to person, place, and time. Psychiatric:         Mood and Affect: Mood normal.         DiagnosticResults       RADIOLOGY:  No orders to display       LABS:   No results found for this visit on 07/07/20. RECENT VITALS:  BP: (!) 141/92, Temp: 98.3 °F (36.8 °C), Pulse: 78,Resp: 18, SpO2: 98 %     Procedures         ED Course     Nursing Notes, Past Medical Hx, Past Surgical Hx, Social Hx, Allergies, and Family Hx were reviewed. The patient was given the followingmedications:  Orders Placed This Encounter   Medications    tamsulosin (FLOMAX) capsule 0.4 mg    tamsulosin (FLOMAX) 0.4 MG capsule     Sig: Take 1 capsule by mouth daily for 5 doses     Dispense:  5 capsule     Refill:  0       CONSULTS:  None    MEDICAL DECISION MAKING / ASSESSMENT / Antonio Heilwood is a 80 y.o. male with a history of aortic regurgitation, atrial fibrillation, hypertension, hyperlipidemia who is presenting with acute urinary retention in the setting of postoperative anesthesia.   The patient also states that he has a history of a slightly enlarged prostate but has otherwise not been started on medications. The patient was given a dose of Flomax here and had a Roberts catheter placed with about a liter of urine output. He felt significantly better after this. The patient is already had a bowel movement today. I sent a message to his urologist Dr. Gabo Alicia the patient's visit here today. This patient was also evaluated by the attending physician. All care plans werediscussed and agreed upon. Clinical Impression     1.  Acute urinary retention        Disposition     PATIENT REFERRED TO:  Anai Russo MD  [place] [address] [address]    Schedule an appointment as soon as possible for a visit in 1 day  For follow-up and removal of your Roberts catheter      DISCHARGE MEDICATIONS:  New Prescriptions    TAMSULOSIN (FLOMAX) 0.4 MG CAPSULE    Take 1 capsule by mouth daily for 5 doses       DISPOSITION Decision To Discharge 07/07/2020 12:43:31 AM       Ivana Zambrano MD  Resident  07/07/20 4418

## 2020-07-07 NOTE — ED TRIAGE NOTES
Pt states he had a procedure done today on his nose and has not been able to urinate since he was released. Pt's wife at the bedside and states her  had general anthesia during the procedure. Pt seems visibly uncomfortable. Pt states he has an enlarged prostate.

## 2020-07-07 NOTE — ANESTHESIA POSTPROCEDURE EVALUATION
Department of Anesthesiology  Postprocedure Note    Patient: Andrés Ash  MRN: [id number]  YOB: 1934  Date of evaluation: 7/6/2020  Time:  8:26 PM     Procedure Summary     Date:  07/06/20 Room / Location:  [address] [place] [address] / [facility]    Anesthesia Start:  1600 Anesthesia Stop:  5499    Procedure:  FULL THICKNESS SKIN GRAFT TO THE NOSE (2.5 X 2 CM), ROTATIONAL ADVANCEMENT FLAP (3X4.2 CM) TO THE LIP (3X2 CM) (N/A ) Diagnosis:       Mohs defect      (Mohs defect [M95.9])    Surgeon:  Jose Mcgee MD Responsible Provider:  Ayanna Montoya MD    Anesthesia Type:  general ASA Status:  3          Anesthesia Type: general    Filipe Phase I: Filipe Score: 10    Filipe Phase II:      Last vitals: Reviewed and per EMR flowsheets.        Anesthesia Post Evaluation    Patient location during evaluation: PACU  Patient participation: complete - patient participated  Level of consciousness: awake  Pain score: 2  Airway patency: patent  Nausea & Vomiting: no nausea and no vomiting  Complications: no  Cardiovascular status: hemodynamically stable  Respiratory status: acceptable  Hydration status: euvolemic

## 2020-07-09 ENCOUNTER — HOSPITAL ENCOUNTER (EMERGENCY)
Age: 85
Discharge: HOME | End: 2020-07-10
Attending: EMERGENCY MEDICINE
Payer: MEDICARE

## 2020-07-09 VITALS
SYSTOLIC BLOOD PRESSURE: 134 MMHG | BODY MASS INDEX: 30.35 KG/M2 | OXYGEN SATURATION: 99 % | TEMPERATURE: 97.5 F | RESPIRATION RATE: 18 BRPM | HEART RATE: 68 BPM | DIASTOLIC BLOOD PRESSURE: 88 MMHG | HEIGHT: 70 IN | WEIGHT: 212 LBS

## 2020-07-09 PROCEDURE — 99282 EMERGENCY DEPT VISIT SF MDM: CPT

## 2020-07-09 ASSESSMENT — ENCOUNTER SYMPTOMS
ABDOMINAL PAIN: 0
SHORTNESS OF BREATH: 0
VOMITING: 0
RESPIRATORY NEGATIVE: 1
NAUSEA: 0

## 2020-07-10 NOTE — ED PROVIDER NOTES
ED Attending Attestation Note     Date of evaluation: 7/9/2020    This patient was seen by the advance practice provider. I have seen and examined the patient, agree with the workup, evaluation, management and diagnosis. The care plan has been discussed. My assessment reveals a well-appearing 27-year-old male with recent Mohs surgery complicated by postoperative urinary retention likely due to BPH who presents today with a Roberts catheter problem. His catheter has had some leakage from around the catheter and has had decreased urine in the bag for the past couple of hours. He does not feel as if his bladder is particularly full. There is a scant amount of urine in his bag. We will attempt to flush the Roberts catheter and if that does not result in some urine output then we will replace it.      Shahla Conti MD  07/09/20 3680

## 2020-07-10 NOTE — ED PROVIDER NOTES
[facility] ENCOUNTER          PHYSICIAN ASSISTANT NOTE       Date of evaluation: 7/9/2020    Chief Complaint     Urinary Retention (hill placed again today, was draining, but today quit when had bloody urine today)      History of Present Illness     Nery Funes is a 80 y.o. male who presents with urinary retention. Patient had surgery for skin cancer on 7/6/20. Patient was seen here on 7/7/20 for urinary retention and had a hill catheter placed. Patient followed-up with Urology, had it removed, but then developed retention again and had it replaced. Patient reports today he has had leaking around the catheter and little drainage in the bag. Patient call his brother who is a retired urologist and told him to come here for flushing or replacement. No fevers, vomiting, abdominal pain, flank pain. Review of Systems     Review of Systems   Constitutional: Negative. Negative for fever. Respiratory: Negative. Negative for shortness of breath. Cardiovascular: Negative. Negative for chest pain. Gastrointestinal: Negative for abdominal pain, nausea and vomiting. Genitourinary: Positive for difficulty urinating. Musculoskeletal: Negative. Skin: Negative. Neurological: Negative. All other systems reviewed and are negative. Past Medical, Surgical, Family, and Social History     He has a past medical history of Aortic regurgitation, Atrial fibrillation ([address] BPH, Hyperlipidemia, Hypertension, Superficial phlebitis, and Ulcerative colitis. He has a past surgical history that includes parathyroidectomy; Salivary gland surgery; hernia repair; Colonoscopy (10/2011); Colonoscopy (09/20/2013); Colonoscopy (10/16/2015); Mohs surgery (07/25/2018); Colonoscopy (N/A, 10/11/2019); and Mohs surgery (N/A, 7/6/2020). His family history includes Kidney Disease in his father; Other in his mother. He reports that he has been smoking cigars.  He has smoked for the past 38.00 tenderness. Genitourinary:     Comments: Roberts catheter in place with a small amount of urine in the bag. Skin:     General: Skin is warm and dry. Neurological:      General: No focal deficit present. Mental Status: He is alert and oriented to person, place, and time. Mental status is at baseline. Psychiatric:         Mood and Affect: Mood normal.         Behavior: Behavior normal.         Thought Content: Thought content normal.         Judgment: Judgment normal.         Diagnostic Results     RADIOLOGY:  No orders to display       LABS:   No results found for this visit on 07/09/20. ED BEDSIDE ULTRASOUND:      RECENT VITALS:  BP: 134/88, Temp: 97.5 °F (36.4 °C), Pulse: 68, Resp: 18, SpO2: 99 %     Procedures         ED Course     Nursing Notes, Past Medical Hx,Past Surgical Hx, Social Hx, Allergies, and Family Hx were reviewed. The patient was given the following medications:  No orders of the defined types were placed in this encounter. CONSULTS:  None    MEDICAL DECISION MAKING / ASSESSMENT / Keyon Chino is a 80 y.o. male with urinary retention. Patient is well-appearing and in no acute distress. Abdomen benign. No fevers or vomiting. The Roberts catheter was flushed in the ER with improved output into the bag, approximately 200 cc. Patient will be discharged with Roberts in place. To follow-up with urology next week as planned. Return precautions discussed. This patient was also evaluated by the attending physician. All care plans were discussed and agreed upon. Clinical Impression     1.  Urinary retention        Disposition     PATIENT REFERRED TO:  [facility]  [address] [place] [address]  [phone]    As needed, If symptoms worsen      DISCHARGE MEDICATIONS:  New Prescriptions    No medications on file       DISPOSITION Decision To Discharge 07/09/2020 11:29:24 PM        Rudy Adams PA-C  07/09/20 [id number]

## 2020-07-13 ENCOUNTER — OFFICE VISIT (OUTPATIENT)
Dept: SURGERY | Age: 85
End: 2020-07-13

## 2020-07-13 VITALS
SYSTOLIC BLOOD PRESSURE: 103 MMHG | DIASTOLIC BLOOD PRESSURE: 57 MMHG | HEIGHT: 70 IN | WEIGHT: 222 LBS | TEMPERATURE: 97.5 F | HEART RATE: 64 BPM | BODY MASS INDEX: 31.78 KG/M2 | RESPIRATION RATE: 16 BRPM

## 2020-07-13 PROCEDURE — 99024 POSTOP FOLLOW-UP VISIT: CPT | Performed by: SURGERY

## 2020-07-13 NOTE — Clinical Note
Jose Dumont is recovering as expected from his nasal and lip reconstructions. The permanent sutures were removed and he will continue with local wound care. Will follow-up with him in a few weeks to ensure continued wound healing success. Let me know if you have any questions. Thanks!   Shanell Hurt

## 2020-07-14 NOTE — PROGRESS NOTES
[facility]    PROCEDURE: 1) Full thickness skin graft to the nasal Mohs defect (2.5 x 2 cm)                                                  2) Rotation advancement flap for right upper lip reconstruction (4.2 x 3 cm)  DATE: 7/6/20    Estee Jones has been recovering satisfactorily since his procedure. Pain has been well controlled without pain medications. He does not that he has had some difficulties with urinary retention recently. EXAM    BP (!) 103/57   Pulse 64   Temp 97.5 °F (36.4 °C)   Resp 16   Ht 5' 10\" (1.778 m)   Wt 222 lb (100.7 kg)   BMI 31.85 kg/m²      GEN: NAD   FACE: Nasal skin graft with good take  Lip incision/flap healing as expected with appropriate edema  SHOULDER: Incision healing well    IMP: 86 y.o.male s/p nasal and lip reconstruction after Mohs defect  PLAN: Doing well overall. Permanent sutures removed. Continue with local wound care. Will follow-up in 1 month.      Stacey Hurt MD  [address] [place] [address] [address] Reconstructive Surgery  [phone]  07/13/20

## 2020-08-10 ENCOUNTER — OFFICE VISIT (OUTPATIENT)
Dept: SURGERY | Age: 85
End: 2020-08-10

## 2020-08-10 VITALS
HEIGHT: 70 IN | WEIGHT: 220 LBS | HEART RATE: 62 BPM | DIASTOLIC BLOOD PRESSURE: 75 MMHG | OXYGEN SATURATION: 96 % | BODY MASS INDEX: 31.5 KG/M2 | SYSTOLIC BLOOD PRESSURE: 130 MMHG | TEMPERATURE: 97.3 F

## 2020-08-10 PROCEDURE — 99024 POSTOP FOLLOW-UP VISIT: CPT | Performed by: SURGERY

## 2020-08-10 NOTE — PROGRESS NOTES
[facility]    PROCEDURE: 1) Full thickness skin graft to the nasal Mohs defect (2.5 x 2 cm)                            2) Rotation advancement flap for right upper lip reconstruction (4.2 x 3 cm)  DATE: 7/6/20    Trina Rain has been recovering satisfactorily since his procedure. Pain has been well controlled without pain medications. EXAM    /75 Comment: recheck after 5 minutes  Pulse 62   Temp 97.3 °F (36.3 °C) (Infrared)   Ht 5' 10\" (1.778 m)   Wt 220 lb (99.8 kg)   SpO2 96%   BMI 31.57 kg/m²      GEN: NAD   FACE: Nasal skin graft with good take  Lip incision/flap healing with some elevation (whistle tip)  SHOULDER: Incision healing well    IMP: 86 y.o.male s/p nasal and lip reconstruction after Mohs defect  PLAN: Doing well overall. He is overall happy. Advised to perform local wound care with massage as well as stop smoking. He will follow-up in 6 months to determine if he would benefit from lip lengthening with Z-plasty.     Carlynn Apley, MD  [address] [place] [address] [address] Reconstructive Surgery  [phone]  08/10/20

## 2020-08-10 NOTE — Clinical Note
He's doing well overall. Will follow-up in a few months to determine if he needs some lip lengthening. Thanks!   [place]

## 2020-09-14 LAB
A/G RATIO: 2.1 (ref 1.1–2.2)
ALBUMIN SERPL-MCNC: 4.2 G/DL (ref 3.4–5)
ALP BLD-CCNC: 76 U/L (ref 40–129)
ALT SERPL-CCNC: 16 U/L (ref 10–40)
ANION GAP SERPL CALCULATED.3IONS-SCNC: 13 MMOL/L (ref 3–16)
AST SERPL-CCNC: 16 U/L (ref 15–37)
BASOPHILS ABSOLUTE: 0.1 K/UL (ref 0–0.2)
BASOPHILS RELATIVE PERCENT: 1.3 %
BILIRUB SERPL-MCNC: 1.2 MG/DL (ref 0–1)
BUN BLDV-MCNC: 23 MG/DL (ref 7–20)
CALCIUM SERPL-MCNC: 8.7 MG/DL (ref 8.3–10.6)
CHLORIDE BLD-SCNC: 107 MMOL/L (ref 99–110)
CHOLESTEROL, TOTAL: 137 MG/DL (ref 0–199)
CO2: 23 MMOL/L (ref 21–32)
CREAT SERPL-MCNC: 1 MG/DL (ref 0.8–1.3)
EOSINOPHILS ABSOLUTE: 0.1 K/UL (ref 0–0.6)
EOSINOPHILS RELATIVE PERCENT: 3.4 %
GFR AFRICAN AMERICAN: >60
GFR NON-AFRICAN AMERICAN: >60
GLOBULIN: 2 G/DL
GLUCOSE BLD-MCNC: 104 MG/DL (ref 70–99)
HCT VFR BLD CALC: 38.6 % (ref 40.5–52.5)
HDLC SERPL-MCNC: 52 MG/DL (ref 40–60)
HEMOGLOBIN: 12.9 G/DL (ref 13.5–17.5)
LDL CHOLESTEROL CALCULATED: 76 MG/DL
LYMPHOCYTES ABSOLUTE: 0.6 K/UL (ref 1–5.1)
LYMPHOCYTES RELATIVE PERCENT: 13.7 %
MCH RBC QN AUTO: 33.4 PG (ref 26–34)
MCHC RBC AUTO-ENTMCNC: 33.5 G/DL (ref 31–36)
MCV RBC AUTO: 99.5 FL (ref 80–100)
MONOCYTES ABSOLUTE: 0.4 K/UL (ref 0–1.3)
MONOCYTES RELATIVE PERCENT: 9.5 %
NEUTROPHILS ABSOLUTE: 3.1 K/UL (ref 1.7–7.7)
NEUTROPHILS RELATIVE PERCENT: 72.1 %
PDW BLD-RTO: 17 % (ref 12.4–15.4)
PLATELET # BLD: 164 K/UL (ref 135–450)
PMV BLD AUTO: 9.4 FL (ref 5–10.5)
POTASSIUM SERPL-SCNC: 4.6 MMOL/L (ref 3.5–5.1)
PROSTATE SPECIFIC ANTIGEN: 14.56 NG/ML (ref 0–4)
RBC # BLD: 3.88 M/UL (ref 4.2–5.9)
SODIUM BLD-SCNC: 143 MMOL/L (ref 136–145)
TOTAL PROTEIN: 6.2 G/DL (ref 6.4–8.2)
TRIGL SERPL-MCNC: 45 MG/DL (ref 0–150)
TSH SERPL DL<=0.05 MIU/L-ACNC: 3.77 UIU/ML (ref 0.27–4.2)
VLDLC SERPL CALC-MCNC: 9 MG/DL
WBC # BLD: 4.3 K/UL (ref 4–11)

## 2020-09-21 ENCOUNTER — TELEPHONE (OUTPATIENT)
Dept: INTERNAL MEDICINE CLINIC | Age: 85
End: 2020-09-21

## 2020-09-21 NOTE — TELEPHONE ENCOUNTER
The anemia is minimal and would not cause swelling    That is his choice, it is a recommended yearly stool testing in addition to the colonoscopy  Would also recommend additional labs are in previous note

## 2020-09-21 NOTE — TELEPHONE ENCOUNTER
Pt given lab results  He's asking if anemia could cause swelling in his ankles    He gets a colonoscopy every year so he won't be doing the stool screening

## 2020-09-23 DIAGNOSIS — D50.8 OTHER IRON DEFICIENCY ANEMIA: ICD-10-CM

## 2020-09-23 LAB
FERRITIN: 69.4 NG/ML (ref 30–400)
FOLATE: 14.71 NG/ML (ref 4.78–24.2)
IRON SATURATION: 27 % (ref 20–50)
IRON: 91 UG/DL (ref 59–158)
TOTAL IRON BINDING CAPACITY: 331 UG/DL (ref 260–445)
VITAMIN B-12: 990 PG/ML (ref 211–911)

## 2020-09-25 ENCOUNTER — NURSE ONLY (OUTPATIENT)
Dept: PRIMARY CARE CLINIC | Age: 85
End: 2020-09-25
Payer: MEDICARE

## 2020-09-25 PROCEDURE — 99211 OFF/OP EST MAY X REQ PHY/QHP: CPT | Performed by: NURSE PRACTITIONER

## 2020-09-28 LAB — SARS-COV-2, NAA: NOT DETECTED

## 2020-09-28 NOTE — PROGRESS NOTES
Pt states that [address] from Tomasz's office will contact him re: eliquis and asa instruction for procedure on 10/2

## 2020-09-28 NOTE — PROGRESS NOTES
C-Difficile admission screening and protocol:     * Admitted with diarrhea? n     *Prior history of C-Diff. In last 3 months?n     *Antibiotic use in the past 6-8 weeks?n     *Prior hospitalization or nursing home in the last month?n        [address] time_______7am_____        Surgery time____________    Take the following medications with a sip of water:    Do not eat or drink anything after 12:00 midnight prior to your surgery. This includes water chewing gum, mints and ice chips. You may brush your teeth and gargle the morning of your surgery, but do not swallow the water     Please see your family doctor/pediatrician for a history and physical and/or concerning medications. Bring any test results/reports from your physicians office. If you are under the care of a heart doctor or specialist doctor, please be aware that you may be asked to them for clearance    You may be asked to stop blood thinners such as Coumadin, Plavix, Fragmin, Lovenox, etc., or any anti-inflammatories such as:  Aspirin, Ibuprofen, Advil, Naproxen prior to your surgery. We also ask that you stop any OTC medications such as fish oil, vitamin E, glucosamine, garlic, Multivitamins, COQ 10, etc.    We ask that you do not smoke 24 hours prior to surgery  We ask that you do not  drink any alcoholic beverages 24 hours prior to surgery     You must make arrangements for a responsible adult to take you home after your surgery. For your safety you will not be allowed to leave alone or drive yourself home. Your surgery will be cancelled if you do not have a ride home. Also for your safety, it is strongly suggested that someone stay with you the first 24 hours after your surgery. A parent or legal guardian must accompany a child scheduled for surgery and plan to stay at the hospital until the child is discharged. Please do not bring other children with you.     For your comfort, please wear simple loose fitting clothing to the hospital.  Please do not bring valuables. Do not wear any make-up or nail polish on your fingers or toes      For your safety, please do not wear any jewelry or body piercing's on the day of surgery. All jewelry must be removed. If you have dentures, they will be removed before going to operating room. For your convenience, we will provide you with a container. If you wear contact lenses or glasses, they will be removed, please bring a case for them. If you have a living will and a durable power of  for healthcare, please bring in a copy. As part of our patient safety program to minimize surgical site infections, we ask you to do the following:    · Please notify your surgeon if you develop any illness between         now and the  day of your surgery. · This includes a cough, cold, fever, sore throat, nausea,         or vomiting, and diarrhea, etc.  ·  Please notify your surgeon if you experience dizziness, shortness         of breath or blurred vision between now and the time of your surgery. Do not shave your operative site 96 hours prior to surgery. For face and neck surgery, men may use an electric razor 48 hours   prior to surgery. You may shower the night before surgery or the morning of   your surgery with an antibacterial soap. You will need to bring a photo ID and insurance card    [facility] has an onsite pharmacy, would you like to utilize our pharmacy     If you will be staying overnight and use a C-pap machine, please bring   your C-pap to hospital     Our goal is to provide you with excellent care, therefore, visitors will be limited to two(2) in the room at a time so that we may focus on providing this care for you. Please contact pre-admission testing if you have any further questions.                  [facility] phone number:  [phone] PAT fax number:  [phone]  Please note these are generalized instructions for all surgical cases, you may be provided with more specific instructions according to your surgery.

## 2020-09-28 NOTE — PROGRESS NOTES
Preoperative Screening for Elective Surgery/Invasive Procedures While COVID-19 present in the community     Have you tested positive or have been told to self-isolate for COVID-19 like symptoms within the past 28 days? n   Do you currently have any of the following symptoms? o Fever >100.0 F or 99.9 F in immunocompromised patients? n  o New onset cough, shortness of breath or difficulty breathing? n  o New onset sore throat, myalgia (muscle aches and pains), headache, loss of taste/smell or diarrhea?n   Have you had a potential exposure to COVID-19 within the past 14 days by:  o Close contact with a confirmed case?n  o Close contact with a healthcare worker,  or essential infrastructure worker (grocery store, [employer], gas station, public utilities or transportation)? YES  o Do you reside in a congregate setting such as; skilled nursing facility, adult home, correctional facility, homeless shelter or other institutional setting?no  o Have you had recent travel to a known COVID-19 hotspot? Pt resides in [address] [place] [address] if the patient has a positive screen by answering yes to one or more of the above questions. Patients who test positive or screen positive prior to surgery or on the day of surgery should be evaluated in conjunction with the surgeon/proceduralist/anesthesiologist to determine the urgency of the procedure.

## 2020-10-01 ENCOUNTER — ANESTHESIA EVENT (OUTPATIENT)
Dept: ENDOSCOPY | Age: 85
End: 2020-10-01
Payer: MEDICARE

## 2020-10-02 ENCOUNTER — ANESTHESIA (OUTPATIENT)
Dept: ENDOSCOPY | Age: 85
End: 2020-10-02
Payer: MEDICARE

## 2020-10-02 ENCOUNTER — HOSPITAL ENCOUNTER (OUTPATIENT)
Age: 85
Setting detail: OUTPATIENT SURGERY
Discharge: HOME | End: 2020-10-02
Attending: INTERNAL MEDICINE | Admitting: INTERNAL MEDICINE
Payer: MEDICARE

## 2020-10-02 VITALS
HEIGHT: 70 IN | WEIGHT: 213.85 LBS | HEART RATE: 56 BPM | SYSTOLIC BLOOD PRESSURE: 109 MMHG | BODY MASS INDEX: 30.61 KG/M2 | OXYGEN SATURATION: 96 % | DIASTOLIC BLOOD PRESSURE: 65 MMHG | RESPIRATION RATE: 16 BRPM | TEMPERATURE: 97 F

## 2020-10-02 VITALS — DIASTOLIC BLOOD PRESSURE: 55 MMHG | OXYGEN SATURATION: 97 % | SYSTOLIC BLOOD PRESSURE: 91 MMHG

## 2020-10-02 LAB — SARS-COV-2, NAAT: NOT DETECTED

## 2020-10-02 PROCEDURE — 7100000000 HC PACU RECOVERY - FIRST 15 MIN: Performed by: INTERNAL MEDICINE

## 2020-10-02 PROCEDURE — 3700000000 HC ANESTHESIA ATTENDED CARE: Performed by: INTERNAL MEDICINE

## 2020-10-02 PROCEDURE — 7100000010 HC PHASE II RECOVERY - FIRST 15 MIN: Performed by: INTERNAL MEDICINE

## 2020-10-02 PROCEDURE — 2500000003 HC RX 250 WO HCPCS

## 2020-10-02 PROCEDURE — 2500000003 HC RX 250 WO HCPCS: Performed by: NURSE ANESTHETIST, CERTIFIED REGISTERED

## 2020-10-02 PROCEDURE — 2720000010 HC SURG SUPPLY STERILE: Performed by: INTERNAL MEDICINE

## 2020-10-02 PROCEDURE — 3609010600 HC COLONOSCOPY POLYPECTOMY SNARE/COLD BIOPSY: Performed by: INTERNAL MEDICINE

## 2020-10-02 PROCEDURE — 88305 TISSUE EXAM BY PATHOLOGIST: CPT

## 2020-10-02 PROCEDURE — 2709999900 HC NON-CHARGEABLE SUPPLY: Performed by: INTERNAL MEDICINE

## 2020-10-02 PROCEDURE — 3609010300 HC COLONOSCOPY W/BIOPSY SINGLE/MULTIPLE: Performed by: INTERNAL MEDICINE

## 2020-10-02 PROCEDURE — U0002 COVID-19 LAB TEST NON-CDC: HCPCS

## 2020-10-02 PROCEDURE — 7100000001 HC PACU RECOVERY - ADDTL 15 MIN: Performed by: INTERNAL MEDICINE

## 2020-10-02 PROCEDURE — 2580000003 HC RX 258: Performed by: ANESTHESIOLOGY

## 2020-10-02 PROCEDURE — 7100000011 HC PHASE II RECOVERY - ADDTL 15 MIN: Performed by: INTERNAL MEDICINE

## 2020-10-02 PROCEDURE — 88342 IMHCHEM/IMCYTCHM 1ST ANTB: CPT

## 2020-10-02 PROCEDURE — 3700000001 HC ADD 15 MINUTES (ANESTHESIA): Performed by: INTERNAL MEDICINE

## 2020-10-02 PROCEDURE — 6360000002 HC RX W HCPCS: Performed by: NURSE ANESTHETIST, CERTIFIED REGISTERED

## 2020-10-02 RX ORDER — PROPOFOL 10 MG/ML
INJECTION, EMULSION INTRAVENOUS CONTINUOUS PRN
Status: DISCONTINUED | OUTPATIENT
Start: 2020-10-02 | End: 2020-10-02 | Stop reason: SDUPTHER

## 2020-10-02 RX ORDER — SODIUM CHLORIDE 0.9 % (FLUSH) 0.9 %
10 SYRINGE (ML) INJECTION PRN
Status: DISCONTINUED | OUTPATIENT
Start: 2020-10-02 | End: 2020-10-02 | Stop reason: HOSPADM

## 2020-10-02 RX ORDER — MORPHINE SULFATE 2 MG/ML
1 INJECTION, SOLUTION INTRAMUSCULAR; INTRAVENOUS EVERY 5 MIN PRN
Status: DISCONTINUED | OUTPATIENT
Start: 2020-10-02 | End: 2020-10-02 | Stop reason: HOSPADM

## 2020-10-02 RX ORDER — PHENYLEPHRINE HCL IN 0.9% NACL 1 MG/10 ML
SYRINGE (ML) INTRAVENOUS PRN
Status: DISCONTINUED | OUTPATIENT
Start: 2020-10-02 | End: 2020-10-02 | Stop reason: SDUPTHER

## 2020-10-02 RX ORDER — OXYCODONE HYDROCHLORIDE 10 MG/1
10 TABLET ORAL PRN
Status: DISCONTINUED | OUTPATIENT
Start: 2020-10-02 | End: 2020-10-02 | Stop reason: HOSPADM

## 2020-10-02 RX ORDER — OXYCODONE HYDROCHLORIDE 5 MG/1
5 TABLET ORAL PRN
Status: DISCONTINUED | OUTPATIENT
Start: 2020-10-02 | End: 2020-10-02 | Stop reason: HOSPADM

## 2020-10-02 RX ORDER — FENTANYL CITRATE 50 UG/ML
50 INJECTION, SOLUTION INTRAMUSCULAR; INTRAVENOUS EVERY 5 MIN PRN
Status: DISCONTINUED | OUTPATIENT
Start: 2020-10-02 | End: 2020-10-02 | Stop reason: HOSPADM

## 2020-10-02 RX ORDER — MORPHINE SULFATE 2 MG/ML
2 INJECTION, SOLUTION INTRAMUSCULAR; INTRAVENOUS EVERY 5 MIN PRN
Status: DISCONTINUED | OUTPATIENT
Start: 2020-10-02 | End: 2020-10-02 | Stop reason: HOSPADM

## 2020-10-02 RX ORDER — MEPERIDINE HYDROCHLORIDE 25 MG/ML
12.5 INJECTION INTRAMUSCULAR; INTRAVENOUS; SUBCUTANEOUS EVERY 5 MIN PRN
Status: DISCONTINUED | OUTPATIENT
Start: 2020-10-02 | End: 2020-10-02 | Stop reason: HOSPADM

## 2020-10-02 RX ORDER — SODIUM CHLORIDE 9 MG/ML
INJECTION, SOLUTION INTRAVENOUS CONTINUOUS
Status: DISCONTINUED | OUTPATIENT
Start: 2020-10-02 | End: 2020-10-02 | Stop reason: HOSPADM

## 2020-10-02 RX ORDER — SODIUM CHLORIDE 0.9 % (FLUSH) 0.9 %
10 SYRINGE (ML) INJECTION EVERY 12 HOURS SCHEDULED
Status: DISCONTINUED | OUTPATIENT
Start: 2020-10-02 | End: 2020-10-02 | Stop reason: HOSPADM

## 2020-10-02 RX ORDER — FENTANYL CITRATE 50 UG/ML
25 INJECTION, SOLUTION INTRAMUSCULAR; INTRAVENOUS EVERY 5 MIN PRN
Status: DISCONTINUED | OUTPATIENT
Start: 2020-10-02 | End: 2020-10-02 | Stop reason: HOSPADM

## 2020-10-02 RX ORDER — ONDANSETRON 2 MG/ML
4 INJECTION INTRAMUSCULAR; INTRAVENOUS
Status: DISCONTINUED | OUTPATIENT
Start: 2020-10-02 | End: 2020-10-02 | Stop reason: HOSPADM

## 2020-10-02 RX ADMIN — SODIUM CHLORIDE: 9 INJECTION, SOLUTION INTRAVENOUS at 07:49

## 2020-10-02 RX ADMIN — Medication 100 MCG: at 08:54

## 2020-10-02 RX ADMIN — Medication 100 MCG: at 09:13

## 2020-10-02 RX ADMIN — Medication 100 MCG: at 08:52

## 2020-10-02 RX ADMIN — Medication 100 MCG: at 09:04

## 2020-10-02 RX ADMIN — PROPOFOL 140 MCG/KG/MIN: 10 INJECTION, EMULSION INTRAVENOUS at 08:32

## 2020-10-02 ASSESSMENT — PULMONARY FUNCTION TESTS
PIF_VALUE: 0

## 2020-10-02 ASSESSMENT — PAIN SCALES - GENERAL
PAINLEVEL_OUTOF10: 0

## 2020-10-02 ASSESSMENT — LIFESTYLE VARIABLES: SMOKING_STATUS: 1

## 2020-10-02 ASSESSMENT — PAIN - FUNCTIONAL ASSESSMENT: PAIN_FUNCTIONAL_ASSESSMENT: 0-10

## 2020-10-02 NOTE — PROGRESS NOTES
Discharge instructions reviewed with patient/responsible adult. All home medications have been reviewed, questions answered and patient verbalized understanding. Discharge instructions signed and copies given. Patient discharged with belongings. IV removed. Pt dressing self wife in room. Call light in reach. Pt stable ready for d/c home.

## 2020-10-02 NOTE — ANESTHESIA POSTPROCEDURE EVALUATION
Department of Anesthesiology  Postprocedure Note    Patient: Virgen Dobson  MRN: [id number]  YOB: 1934  Date of evaluation: 10/2/2020  Time:  2:14 PM     Procedure Summary     Date:  10/02/20 Room / Location:  [address] [place] [address]    Anesthesia Start:  [facility] Anesthesia Stop:  0922    Procedures:       COLONOSCOPY WITH BIOPSY      COLONOSCOPY POLYPECTOMY SNARE/COLD BIOPSY Diagnosis:       Ulcerative pancolitis ([address]      (ULCERATIVE PANCOLITIS)    Surgeon:  Juliocesar Boswell MD Responsible Provider:  Sherif Thomas MD    Anesthesia Type:  general ASA Status:  3          Anesthesia Type: general    Filipe Phase I: Filipe Score: 10    Filipe Phase II: Filipe Score: 10    Last vitals: Reviewed and per EMR flowsheets.        Anesthesia Post Evaluation    Patient location during evaluation: PACU  Patient participation: complete - patient participated  Level of consciousness: awake and alert  Pain score: 0  Airway patency: patent  Nausea & Vomiting: no nausea and no vomiting  Complications: no  Cardiovascular status: blood pressure returned to baseline  Respiratory status: acceptable  Hydration status: euvolemic

## 2020-10-02 NOTE — OP NOTE
(ZOCOR) 20 MG tablet TAKE 1 TABLET IN THE EVENING 90 tablet 1    latanoprost (XALATAN) 0.005 % ophthalmic solution Place 1 drop into both eyes nightly.  aspirin EC 81 MG EC tablet Take 1 tablet by mouth daily. 30 tablet 12    mercaptopurine (PURINETHOL) 50 MG tablet Take 50 mg by mouth daily.  tamsulosin (FLOMAX) 0.4 MG capsule Take 1 capsule by mouth daily for 5 doses 5 capsule 0    sildenafil (VIAGRA) 100 MG tablet Take 1 tablet by mouth as needed for Erectile Dysfunction 16 tablet 2        Allergies:  Iodine    Social History:   Social History     Socioeconomic History    Marital status:      Spouse name: Not on file    Number of children: 3    Years of education: Not on file    Highest education level: Not on file   Occupational History    Occupation: sold corrugated boxes     Comment: retired   Social Needs    Financial resource strain: Not on file    Food insecurity     Worry: Not on file     Inability: Not on file   Publictivity needs     Medical: Not on file     Non-medical: Not on file   Tobacco Use    Smoking status: Current Some Day Smoker     Packs/day: 1.00     Years: 38.00     Pack years: 38.00     Types: Cigars, Cigarettes    Smokeless tobacco: Never Used   Substance and Sexual Activity    Alcohol use:  Yes     Alcohol/week: 0.0 standard drinks     Comment: socially    Drug use: No    Sexual activity: Yes     Partners: Female   Lifestyle    Physical activity     Days per week: Not on file     Minutes per session: Not on file    Stress: Not on file   Relationships    Social connections     Talks on phone: Not on file     Gets together: Not on file     Attends Restoration service: Not on file     Active member of club or organization: Not on file     Attends meetings of clubs or organizations: Not on file     Relationship status: Not on file    Intimate partner violence     Fear of current or ex partner: Not on file     Emotionally abused: Not on file Physically abused: Not on file     Forced sexual activity: Not on file   Other Topics Concern    Not on file   Social History Narrative    Not on file      Family History:       Problem Relation Age of Onset    Other Mother         'intestine shut down'    Kidney Disease Father         PHYSICAL EXAM:      BP (!) 98/39   Pulse 65   Temp 96.9 °F (36.1 °C) (Temporal)   Resp 14   Ht 5' 10\" (1.778 m)   Wt 213 lb 13.5 oz (97 kg)   SpO2 92%   BMI 30.68 kg/m²  I        Heart:  RRR    Lungs:  CTA b    Abdomen:  S/NT/ND/+BS      ASSESSMENT AND PLAN:  ASA: per anesthesia  Mallampati: per anesthesia  1. Patient is a 80 y.o. male here for colonoscopy   2. Procedure options, risks and benefits reviewed with the patient. The patient expresses understanding.     Red Sheth

## 2020-10-02 NOTE — ANESTHESIA PRE PROCEDURE
Department of Anesthesiology  Preprocedure Note       Name:  Lila Park   Age:  80 y.o.  :  1934                                          MRN:  [id number]         Date:  10/2/2020      Surgeon: Kacie Melton):  Luiz Blunt MD    Procedure: Procedure(s):  colonoscopy with ablation of lesion  Medications prior to admission:   Prior to Admission medications    Medication Sig Start Date End Date Taking? Authorizing Provider   tamsulosin (FLOMAX) 0.4 MG capsule Take 1 capsule by mouth daily for 5 doses 20  Fabi Summers MD   carvedilol (COREG) 12.5 MG tablet Take 1 tablet by mouth 2 times daily (with meals) 20   Tiffany Cui MD   lisinopril (PRINIVIL;ZESTRIL) 10 MG tablet Take 1 tablet by mouth daily 20   Tiffany Cui MD   apixaban Priscille Cones) 5 MG TABS tablet Take 1 tablet by mouth 2 times daily 20   Tiffany Cui MD   dilTIAZem (CARDIZEM CD) 180 MG extended release capsule Take 1 capsule by mouth daily 20   Tiffany Cui MD   Vitamin D (CHOLECALCIFEROL) 1000 UNITS CAPS capsule Take 1 capsule by mouth daily 11/3/15   Tiffany Cui MD   sildenafil (VIAGRA) 100 MG tablet Take 1 tablet by mouth as needed for Erectile Dysfunction 11/3/15   Tiffany Cui MD   simvastatin (ZOCOR) 20 MG tablet TAKE 1 TABLET IN THE EVENING 10/28/13   Tiffany Cui MD   latanoprost (XALATAN) 0.005 % ophthalmic solution Place 1 drop into both eyes nightly. Historical Provider, MD   aspirin EC 81 MG EC tablet Take 1 tablet by mouth daily. 11/15/10   Tiffany Cui MD   mercaptopurine (PURINETHOL) 50 MG tablet Take 50 mg by mouth daily. Historical Provider, MD       Current medications:    No current facility-administered medications for this visit. No current outpatient medications on file.      Facility-Administered Medications Ordered in Other Visits   Medication Dose Route Frequency Provider Last Rate Last Dose    0.9 % sodium chloride infusion   Intravenous Continuous Gely Lainez MD        sodium chloride flush 0.9 % injection 10 mL  10 mL Intravenous 2 times per day Gely Lainez MD        sodium chloride flush 0.9 % injection 10 mL  10 mL Intravenous PRN Gely Lainez MD           Allergies:     Allergies   Allergen Reactions    Iodine      **IVP DYES**       Problem List:    Patient Active Problem List   Diagnosis Code    Benign non-nodular prostatic hyperplasia without lower urinary tract symptoms N40.0    Aortic valve disorder I35.9    Hyperlipidemia E78.5    Ulcerative colitis ([address] K51.90    Hyperparathyroidism ([address] E21.3    CAD (coronary artery disease) I25.10    Ventral hernia K43.9    Slow transit constipation K59.01    Gross hematuria R31.0    Basal cell carcinoma, face C44.310    Sacral back pain M53.3    Leg edema, left R60.0    Permanent atrial fibrillation (HCC) I48.21       Past Medical History:        Diagnosis Date    Aortic regurgitation     Atrial fibrillation ([address]     BPH     Hyperlipidemia     Hypertension     Superficial phlebitis     Ulcerative colitis     Urinary retention        Past Surgical History:        Procedure Laterality Date    COLONOSCOPY  10/2011    COLONOSCOPY  09/20/2013    COLONOSCOPY  10/16/2015    COLONOSCOPY N/A 10/11/2019    COLONOSCOPY CONTROL HEMORRHAGE performed by Melquiades Orellana MD at [facility]      inguinal    MOHS SURGERY  07/25/2018    Left lower lip, left cheek, right cheek    MOHS SURGERY N/A 7/6/2020    FULL THICKNESS SKIN GRAFT TO THE NOSE (2.5 X 2 CM), ROTATIONAL ADVANCEMENT FLAP (3X4.2 CM) TO THE LIP (3X2 CM) performed by Cynthia Álvarez MD at [facility]      multiple times    PARATHYROIDECTOMY      SALIVARY GLAND SURGERY      parotid       Social History:    Social History     Tobacco Use    Smoking status: Current Some Day Smoker     Packs/day: 1.00     Years: 38.00     Pack years: 38.00     Types: Cigars, Cigarettes    Smokeless tobacco: Never Used   Substance Use Topics    Alcohol use: Yes     Alcohol/week: 0.0 standard drinks     Comment: socially                                Ready to quit: Not Answered  Counseling given: Not Answered      Vital Signs (Current): There were no vitals filed for this visit. BP Readings from Last 3 Encounters:   08/10/20 130/75   07/13/20 (!) 103/57   07/09/20 134/88       NPO Status:                                                                                 BMI:   Wt Readings from Last 3 Encounters:   09/28/20 214 lb (97.1 kg)   08/10/20 220 lb (99.8 kg)   07/13/20 222 lb (100.7 kg)     There is no height or weight on file to calculate BMI.    CBC:   Lab Results   Component Value Date    WBC 4.3 09/14/2020    RBC 3.88 09/14/2020    HGB 12.9 09/14/2020    HCT 38.6 09/14/2020    MCV 99.5 09/14/2020    RDW 17.0 09/14/2020     09/14/2020       CMP:   Lab Results   Component Value Date     09/14/2020    K 4.6 09/14/2020     09/14/2020    CO2 23 09/14/2020    BUN 23 09/14/2020    CREATININE 1.0 09/14/2020    GFRAA >60 09/14/2020    GFRAA >60 07/18/2012    AGRATIO 2.1 09/14/2020    LABGLOM >60 09/14/2020    GLUCOSE 104 09/14/2020    GLUCOSE 99 09/30/2011    PROT 6.2 09/14/2020    PROT 6.6 07/18/2012    CALCIUM 8.7 09/14/2020    BILITOT 1.2 09/14/2020    ALKPHOS 76 09/14/2020    AST 16 09/14/2020    ALT 16 09/14/2020       POC Tests: No results for input(s): POCGLU, POCNA, POCK, POCCL, POCBUN, POCHEMO, POCHCT in the last 72 hours.     Coags: No results found for: PROTIME, INR, APTT    HCG (If Applicable): No results found for: PREGTESTUR, PREGSERUM, HCG, HCGQUANT     ABGs: No results found for: PHART, PO2ART, AIF2ZWG, DJR0AZX, BEART, Z4WMOHCB     Type & Screen (If Applicable):  No results found for: LABABO, LABRH    Drug/Infectious Status (If Applicable):  No results found for: HIV, HEPCAB    COVID-19 Screening (If MD  October 2, 2020  7:17 AM

## 2020-10-02 NOTE — PROGRESS NOTES
Pt arrived to phase 2 from pacu a&o. VSS on monitor. Ab soft. Pt breathes easy on RA. Pt denies pain and nausea. Pt up to chair from bed pt tolerates transfer well. Snack and drink given pt tolerates po well. Pt up in chair call light and bedside table in reach.

## 2020-10-03 NOTE — H&P
[address] [place] [address]  [facility]   Pre-operative History and Physical     Patient: Conor Hammonds                        : 1934                        Acct#:      HISTORY OF PRESENT ILLNESS:     The patient is a 80 y.o. male who presents with 3cm adenoma on proximal side of fold in hepatic flexure removed piecemeal in 2019.   Here to check for residual.  Also with ulcerative colitis for surveillance biopsies.     Past Medical History:    Past Medical History             Diagnosis Date    Aortic regurgitation      Atrial fibrillation (HCC)      BPH      Hyperlipidemia      Hypertension      Superficial phlebitis      Ulcerative colitis      Urinary retention           Past Surgical History:    Past Surgical History             Procedure Laterality Date    COLONOSCOPY   10/2011    COLONOSCOPY   2013    COLONOSCOPY   10/16/2015    COLONOSCOPY N/A 10/11/2019     COLONOSCOPY CONTROL HEMORRHAGE performed by Alysia Marroquin MD at [address] [place] [address] inguinal    MOHS SURGERY   2018     Left lower lip, left cheek, right cheek    MOHS SURGERY N/A 2020     FULL THICKNESS SKIN GRAFT TO THE NOSE (2.5 X 2 CM), ROTATIONAL ADVANCEMENT FLAP (3X4.2 CM) TO THE LIP (3X2 CM) performed by Valerie Song MD at [facility]         multiple times    PARATHYROIDECTOMY        SALIVARY GLAND SURGERY         parotid         Medications Prior to Admission:   No current facility-administered medications on file prior to encounter.              Current Outpatient Medications on File Prior to Encounter   Medication Sig Dispense Refill    carvedilol (COREG) 12.5 MG tablet Take 1 tablet by mouth 2 times daily (with meals) 90 tablet 1    lisinopril (PRINIVIL;ZESTRIL) 10 MG tablet Take 1 tablet by mouth daily 90 tablet 1    apixaban (ELIQUIS) 5 MG TABS tablet Take 1 tablet by mouth 2 times daily 180 tablet 1    dilTIAZem (CARDIZEM CD) 180 MG extended release capsule Take 1 capsule by mouth daily 90 capsule 1    Vitamin D (CHOLECALCIFEROL) 1000 UNITS CAPS capsule Take 1 capsule by mouth daily 30 capsule 0    simvastatin (ZOCOR) 20 MG tablet TAKE 1 TABLET IN THE EVENING 90 tablet 1    latanoprost (XALATAN) 0.005 % ophthalmic solution Place 1 drop into both eyes nightly.          aspirin EC 81 MG EC tablet Take 1 tablet by mouth daily. 30 tablet 12    mercaptopurine (PURINETHOL) 50 MG tablet Take 50 mg by mouth daily.        tamsulosin (FLOMAX) 0.4 MG capsule Take 1 capsule by mouth daily for 5 doses 5 capsule 0    sildenafil (VIAGRA) 100 MG tablet Take 1 tablet by mouth as needed for Erectile Dysfunction 16 tablet 2         Allergies:  Iodine     Social History:   Social History               Socioeconomic History    Marital status:        Spouse name: Not on file    Number of children: 3    Years of education: Not on file    Highest education level: Not on file   Occupational History    Occupation: sold corrugated boxes       Comment: retired   Social Needs    Financial resource strain: Not on file    Food insecurity       Worry: Not on file       Inability: Not on file    Transportation needs       Medical: Not on file       Non-medical: Not on file   Tobacco Use    Smoking status: Current Some Day Smoker       Packs/day: 1.00       Years: 38.00       Pack years: 38.00       Types: Cigars, Cigarettes    Smokeless tobacco: Never Used   Substance and Sexual Activity    Alcohol use:  Yes       Alcohol/week: 0.0 standard drinks       Comment: socially    Drug use: No    Sexual activity: Yes       Partners: Female   Lifestyle    Physical activity       Days per week: Not on file       Minutes per session: Not on file    Stress: Not on file   Relationships    Social connections       Talks on phone: Not on file       Gets together: Not on file       Attends [employer] service: Not on file       Active member of club or organization: Not on file       Attends meetings of clubs or organizations: Not on file       Relationship status: Not on file    Intimate partner violence       Fear of current or ex partner: Not on file       Emotionally abused: Not on file       Physically abused: Not on file       Forced sexual activity: Not on file   Other Topics Concern    Not on file   Social History Narrative    Not on file         Family History:   Family History             Problem Relation Age of Onset    Other Mother           'intestine shut down'    Kidney Disease Father              PHYSICAL EXAM:       BP (!) 98/39   Pulse 65   Temp 96.9 °F (36.1 °C) (Temporal)   Resp 14   Ht 5' 10\" (1.778 m)   Wt 213 lb 13.5 oz (97 kg)   SpO2 92%   BMI 30.68 kg/m²  I          Heart:  RRR     Lungs:  CTA b     Abdomen:  S/NT/ND/+BS        ASSESSMENT AND PLAN:  ASA: per anesthesia  Mallampati: per anesthesia  1. Patient is a 80 y.o. male here for colonoscopy   2. Procedure options, risks and benefits reviewed with the patient.   The patient expresses understanding.     Pallavi Farrell MD  [facility]

## 2020-11-10 ENCOUNTER — OFFICE VISIT (OUTPATIENT)
Dept: INTERNAL MEDICINE CLINIC | Age: 85
End: 2020-11-10
Payer: MEDICARE

## 2020-11-10 VITALS
HEART RATE: 78 BPM | DIASTOLIC BLOOD PRESSURE: 68 MMHG | HEIGHT: 70 IN | SYSTOLIC BLOOD PRESSURE: 124 MMHG | TEMPERATURE: 97.5 F | WEIGHT: 221.3 LBS | BODY MASS INDEX: 31.68 KG/M2 | OXYGEN SATURATION: 97 %

## 2020-11-10 PROCEDURE — 1123F ACP DISCUSS/DSCN MKR DOCD: CPT | Performed by: NURSE PRACTITIONER

## 2020-11-10 PROCEDURE — G8484 FLU IMMUNIZE NO ADMIN: HCPCS | Performed by: NURSE PRACTITIONER

## 2020-11-10 PROCEDURE — 4004F PT TOBACCO SCREEN RCVD TLK: CPT | Performed by: NURSE PRACTITIONER

## 2020-11-10 PROCEDURE — G8427 DOCREV CUR MEDS BY ELIG CLIN: HCPCS | Performed by: NURSE PRACTITIONER

## 2020-11-10 PROCEDURE — 4040F PNEUMOC VAC/ADMIN/RCVD: CPT | Performed by: NURSE PRACTITIONER

## 2020-11-10 PROCEDURE — 99214 OFFICE O/P EST MOD 30 MIN: CPT | Performed by: NURSE PRACTITIONER

## 2020-11-10 PROCEDURE — G8417 CALC BMI ABV UP PARAM F/U: HCPCS | Performed by: NURSE PRACTITIONER

## 2020-11-10 PROCEDURE — G0008 ADMIN INFLUENZA VIRUS VAC: HCPCS | Performed by: NURSE PRACTITIONER

## 2020-11-10 PROCEDURE — 90694 VACC AIIV4 NO PRSRV 0.5ML IM: CPT | Performed by: NURSE PRACTITIONER

## 2020-11-10 ASSESSMENT — PATIENT HEALTH QUESTIONNAIRE - PHQ9
SUM OF ALL RESPONSES TO PHQ QUESTIONS 1-9: 0
DEPRESSION UNABLE TO ASSESS: FUNCTIONAL CAPACITY MOTIVATION LIMITS ACCURACY
1. LITTLE INTEREST OR PLEASURE IN DOING THINGS: 0
SUM OF ALL RESPONSES TO PHQ QUESTIONS 1-9: 0
2. FEELING DOWN, DEPRESSED OR HOPELESS: 0
2. FEELING DOWN, DEPRESSED OR HOPELESS: 0
SUM OF ALL RESPONSES TO PHQ9 QUESTIONS 1 & 2: 0
SUM OF ALL RESPONSES TO PHQ QUESTIONS 1-9: 0
SUM OF ALL RESPONSES TO PHQ9 QUESTIONS 1 & 2: 0
1. LITTLE INTEREST OR PLEASURE IN DOING THINGS: 0

## 2020-11-10 ASSESSMENT — ENCOUNTER SYMPTOMS
COLOR CHANGE: 0
CHEST TIGHTNESS: 0
SHORTNESS OF BREATH: 0
COUGH: 0
EYE REDNESS: 0
EYE ITCHING: 0
NAUSEA: 0
SORE THROAT: 0
DIARRHEA: 0
RHINORRHEA: 0
SINUS PRESSURE: 0
CONSTIPATION: 0
BACK PAIN: 0
WHEEZING: 0
BLOOD IN STOOL: 0
VOMITING: 0
ABDOMINAL PAIN: 0

## 2020-11-10 NOTE — PROGRESS NOTES
Subjective:      Patient ID: Alice Muller is a 80 y.o. male. Chief Complaint   Patient presents with    Hyperlipidemia    Hypertension        HPI   Alice Muller returns for follow up of hypertension. He has been taking His medications as prescribed. Patient's blood pressure is  controlled. Side effects related to taking the medications include no medication side effects noted    Patient returns for follow up of hyperlipidemia. Patient has been taking His medications as prescribed. Patient's lipids are controlled. Side effects related to taking the medications include none. He has had clinical consequences related to hyperlipidemia including, but not limited to acute coronary syndrome, stroke or chronic kidney disease. He reports some leg swelling that is worse in the left leg he has had dopplers to rule out dvt and has seen cardiology who offered diuretics, but he does not want to urinate more than he already does. He has compression stockings, but does not wear them.      Past Medical History:   Diagnosis Date    Aortic regurgitation     Atrial fibrillation ([facility] [address]     BPH     Hyperlipidemia     Hypertension     Superficial phlebitis     Ulcerative colitis     Urinary retention      Past Surgical History:   Procedure Laterality Date    COLONOSCOPY  10/2011    COLONOSCOPY  09/20/2013    COLONOSCOPY  10/16/2015    COLONOSCOPY N/A 10/11/2019    COLONOSCOPY CONTROL HEMORRHAGE performed by Shama Cunha MD at [address]  10/2/2020    COLONOSCOPY WITH BIOPSY performed by Shama Cunha MD at [address]  10/2/2020    COLONOSCOPY POLYPECTOMY SNARE/COLD BIOPSY performed by Shama Cunha MD at [facility]      inguinal    MOHS SURGERY  07/25/2018    Left lower lip, left cheek, right cheek    MOHS SURGERY N/A 7/6/2020    FULL THICKNESS SKIN GRAFT TO THE NOSE (2.5 X 2 CM), ROTATIONAL ADVANCEMENT FLAP (3X4.2 CM) TO THE LIP (3X2 CM) performed by Ibeth Garza MD at [facility]      multiple times    PARATHYROIDECTOMY      SALIVARY GLAND SURGERY      parotid     Family History   Problem Relation Age of Onset    Other Mother         'intestine shut down'    Kidney Disease Father      Social History     Socioeconomic History    Marital status:      Spouse name: Not on file    Number of children: 3    Years of education: Not on file    Highest education level: Not on file   Occupational History    Occupation: sold [employer]     Comment: retired   Social Needs    Financial resource strain: Not on file    Food insecurity     Worry: Not on file     Inability: Not on file   [employer] needs     Medical: Not on file     Non-medical: Not on file   Tobacco Use    Smoking status: Current Some Day Smoker     Packs/day: 1.00     Years: 38.00     Pack years: 38.00     Types: Cigars, Cigarettes    Smokeless tobacco: Never Used   Substance and Sexual Activity    Alcohol use: Yes     Alcohol/week: 0.0 standard drinks     Comment: socially    Drug use: No    Sexual activity: Yes     Partners: Female   Lifestyle    Physical activity     Days per week: Not on file     Minutes per session: Not on file    Stress: Not on file   Relationships    Social connections     Talks on phone: Not on file     Gets together: Not on file     Attends [employer] service: Not on file     Active member of club or organization: Not on file     Attends meetings of clubs or organizations: Not on file     Relationship status: Not on file    Intimate partner violence     Fear of current or ex partner: Not on file     Emotionally abused: Not on file     Physically abused: Not on file     Forced sexual activity: Not on file   Other Topics Concern    Not on file   Social History Narrative    Not on file       Review of Systems   Constitutional: Negative for chills, fatigue and fever.    HENT: Negative for congestion, ear pain, postnasal drip, rhinorrhea, sinus pressure, sneezing and sore throat. Eyes: Negative for redness and itching. Respiratory: Negative for cough, chest tightness, shortness of breath and wheezing. Cardiovascular: Positive for leg swelling (L>R). Negative for chest pain and palpitations. Gastrointestinal: Negative for abdominal pain, blood in stool, constipation, diarrhea, nausea and vomiting. Endocrine: Negative for cold intolerance and heat intolerance. Genitourinary: Negative for difficulty urinating, dysuria, flank pain, frequency, hematuria and urgency. Musculoskeletal: Negative for arthralgias, back pain, joint swelling and myalgias. Skin: Negative for color change, pallor, rash and wound. Allergic/Immunologic: Negative for environmental allergies and food allergies. Neurological: Negative for dizziness, seizures, syncope, weakness, light-headedness, numbness and headaches. Hematological: Negative for adenopathy. Does not bruise/bleed easily. Psychiatric/Behavioral: Negative for confusion, sleep disturbance and suicidal ideas. The patient is not nervous/anxious and is not hyperactive. Objective:     Vitals:    11/10/20 1136   BP: 124/68   Site: Left Upper Arm   Position: Sitting   Cuff Size: Medium Adult   Pulse: 78   Temp: 97.5 °F (36.4 °C)   TempSrc: Temporal   SpO2: 97%   Weight: 221 lb 4.8 oz (100.4 kg)   Height: 5' 10\" (1.778 m)     Physical Exam  Constitutional:       Appearance: He is well-developed. HENT:      Right Ear: Hearing, tympanic membrane, ear canal and external ear normal.      Left Ear: Hearing, tympanic membrane, ear canal and external ear normal.      Nose: No mucosal edema or rhinorrhea. Right Sinus: No maxillary sinus tenderness or frontal sinus tenderness. Left Sinus: No maxillary sinus tenderness or frontal sinus tenderness. Mouth/Throat:      Pharynx: No oropharyngeal exudate or posterior oropharyngeal erythema. Tonsils: No tonsillar abscesses. Cardiovascular:      Rate and Rhythm: Normal rate and regular rhythm. Heart sounds: Normal heart sounds. Pulmonary:      Effort: Pulmonary effort is normal.      Breath sounds: Normal breath sounds. Musculoskeletal:      Right lower le+ Edema present. Left lower le+ Edema present. Lymphadenopathy:      Head:      Right side of head: No submental, submandibular, tonsillar, preauricular, posterior auricular or occipital adenopathy. Left side of head: No submental, submandibular, tonsillar, preauricular, posterior auricular or occipital adenopathy. Cervical: No cervical adenopathy. Skin:     General: Skin is warm and dry. Current Outpatient Medications   Medication Sig Dispense Refill    carvedilol (COREG) 12.5 MG tablet Take 1 tablet by mouth 2 times daily (with meals) 90 tablet 1    lisinopril (PRINIVIL;ZESTRIL) 10 MG tablet Take 1 tablet by mouth daily 90 tablet 1    apixaban (ELIQUIS) 5 MG TABS tablet Take 1 tablet by mouth 2 times daily 180 tablet 1    dilTIAZem (CARDIZEM CD) 180 MG extended release capsule Take 1 capsule by mouth daily 90 capsule 1    Vitamin D (CHOLECALCIFEROL) 1000 UNITS CAPS capsule Take 1 capsule by mouth daily 30 capsule 0    sildenafil (VIAGRA) 100 MG tablet Take 1 tablet by mouth as needed for Erectile Dysfunction 16 tablet 2    simvastatin (ZOCOR) 20 MG tablet TAKE 1 TABLET IN THE EVENING 90 tablet 1    latanoprost (XALATAN) 0.005 % ophthalmic solution Place 1 drop into both eyes nightly.  aspirin EC 81 MG EC tablet Take 1 tablet by mouth daily. 30 tablet 12    mercaptopurine (PURINETHOL) 50 MG tablet Take 50 mg by mouth daily.  tamsulosin (FLOMAX) 0.4 MG capsule Take 1 capsule by mouth daily for 5 doses 5 capsule 0     No current facility-administered medications for this visit.       PHQ Scores 11/10/2020 2020 10/7/2019 2019 2019 2019 9/10/2018   PHQ2 Score 0 0 0 0 0 0 0   PHQ9 Score 0 0 0 0 0 0 0 :     1. Benign non-nodular prostatic hyperplasia without lower urinary tract symptoms    2. Coronary artery disease involving native coronary artery of native heart without angina pectoris    3. Mixed hyperlipidemia    4. Permanent atrial fibrillation ([address]    5. Leg edema, left      Plan:     Problem List     Benign non-nodular prostatic hyperplasia without lower urinary tract symptoms     Follows with the urology group           Hyperlipidemia     Stable, controlled  No changes  Continue current treatment plan            Relevant Medications    aspirin EC 81 MG EC tablet    simvastatin (ZOCOR) 20 MG tablet    sildenafil (VIAGRA) 100 MG tablet    carvedilol (COREG) 12.5 MG tablet    lisinopril (PRINIVIL;ZESTRIL) 10 MG tablet    apixaban (ELIQUIS) 5 MG TABS tablet    dilTIAZem (CARDIZEM CD) 180 MG extended release capsule    CAD (coronary artery disease)     Stable, controlled  No changes  Continue current treatment plan   No angina           Relevant Medications    aspirin EC 81 MG EC tablet    simvastatin (ZOCOR) 20 MG tablet    sildenafil (VIAGRA) 100 MG tablet    carvedilol (COREG) 12.5 MG tablet    lisinopril (PRINIVIL;ZESTRIL) 10 MG tablet    apixaban (ELIQUIS) 5 MG TABS tablet    dilTIAZem (CARDIZEM CD) 180 MG extended release capsule    Leg edema, left     Negative dopplers also on eliquis  Recommend he use compression stockings since he does not   If swelling does not improve, follow up         Permanent atrial fibrillation (HCC)     Stable, controlled  No changes  Continue current treatment plan                     Discussed use, benefit, and side effects of all prescribed medications. Barriers to medication compliance addressed. All patient questions answered. Pt voiced understanding. All patientquestions answered. Pt voiced understanding.

## 2021-05-05 ENCOUNTER — OFFICE VISIT (OUTPATIENT)
Dept: SURGERY | Age: 86
End: 2021-05-05
Payer: MEDICARE

## 2021-05-05 VITALS
WEIGHT: 226 LBS | OXYGEN SATURATION: 97 % | BODY MASS INDEX: 32.43 KG/M2 | HEART RATE: 61 BPM | TEMPERATURE: 98 F | SYSTOLIC BLOOD PRESSURE: 147 MMHG | DIASTOLIC BLOOD PRESSURE: 81 MMHG | RESPIRATION RATE: 16 BRPM

## 2021-05-05 DIAGNOSIS — Z09 POSTOP CHECK: Primary | ICD-10-CM

## 2021-05-05 PROCEDURE — G8417 CALC BMI ABV UP PARAM F/U: HCPCS | Performed by: SURGERY

## 2021-05-05 PROCEDURE — 4004F PT TOBACCO SCREEN RCVD TLK: CPT | Performed by: SURGERY

## 2021-05-05 PROCEDURE — 1123F ACP DISCUSS/DSCN MKR DOCD: CPT | Performed by: SURGERY

## 2021-05-05 PROCEDURE — 99213 OFFICE O/P EST LOW 20 MIN: CPT | Performed by: SURGERY

## 2021-05-05 PROCEDURE — 4040F PNEUMOC VAC/ADMIN/RCVD: CPT | Performed by: SURGERY

## 2021-05-05 PROCEDURE — G8427 DOCREV CUR MEDS BY ELIG CLIN: HCPCS | Performed by: SURGERY

## 2021-05-05 NOTE — PROGRESS NOTES
[facility]    PROCEDURE: 1) Full thickness skin graft to the nasal Mohs defect (2.5 x 2 cm)                            2) Rotation advancement flap for right upper lip reconstruction (4.2 x 3 cm)  DATE: 7/6/20    Geovanni Ache has been recovering well since his procedure. Pain has been well controlled without pain medications.      PMHx:   Past Medical History:   Diagnosis Date    Aortic regurgitation     Atrial fibrillation ([address]     BPH     Hyperlipidemia     Hypertension     Superficial phlebitis     Ulcerative colitis     Urinary retention    Afib (on anticoagulation)    PSHx:   Past Surgical History:   Procedure Laterality Date    COLONOSCOPY  10/2011    COLONOSCOPY  09/20/2013    COLONOSCOPY  10/16/2015    COLONOSCOPY N/A 10/11/2019    COLONOSCOPY CONTROL HEMORRHAGE performed by Kyle Maldonado MD at [address]  10/2/2020    COLONOSCOPY WITH BIOPSY performed by Kyle Maldonado MD at [address]  10/2/2020    COLONOSCOPY POLYPECTOMY SNARE/COLD BIOPSY performed by Kyle Maldonado MD at [facility] [facility]    MOHS SURGERY  07/25/2018    Left lower lip, left cheek, right cheek    MOHS SURGERY N/A 7/6/2020    FULL THICKNESS SKIN GRAFT TO THE NOSE (2.5 X 2 CM), ROTATIONAL ADVANCEMENT FLAP (3X4.2 CM) TO THE LIP (3X2 CM) performed by Yazmin Davidson MD at [facility]      multiple times    PARATHYROIDECTOMY      SALIVARY GLAND SURGERY      parotid     Allergy:   Allergies   Allergen Reactions    Iodine      **IVP DYES**       SHx:   Social History     Socioeconomic History    Marital status:      Spouse name: Not on file    Number of children: 3    Years of education: Not on file    Highest education level: Not on file   Occupational History    Occupation: sold [employer]     Comment: retired   Social Needs    Financial resource strain: Not on file   [employer] insecurity Worry: Not on file     Inability: Not on file    Transportation needs     Medical: Not on file     Non-medical: Not on file   Tobacco Use    Smoking status: Current Some Day Smoker     Packs/day: 1.00     Years: 38.00     Pack years: 38.00     Types: Cigars, Cigarettes    Smokeless tobacco: Never Used   Substance and Sexual Activity    Alcohol use:  Yes     Alcohol/week: 0.0 standard drinks     Comment: socially    Drug use: No    Sexual activity: Yes     Partners: Female   Lifestyle    Physical activity     Days per week: Not on file     Minutes per session: Not on file    Stress: Not on file   Relationships    Social connections     Talks on phone: Not on file     Gets together: Not on file     Attends Episcopalian service: Not on file     Active member of club or organization: Not on file     Attends meetings of clubs or organizations: Not on file     Relationship status: Not on file    Intimate partner violence     Fear of current or ex partner: Not on file     Emotionally abused: Not on file     Physically abused: Not on file     Forced sexual activity: Not on file   Other Topics Concern    Not on file   Social History Narrative    Not on file     FHx: Family history reviewed and is noncontributory  Meds:   Current Outpatient Medications   Medication Sig Dispense Refill    tamsulosin (FLOMAX) 0.4 MG capsule Take 1 capsule by mouth daily for 5 doses 5 capsule 0    carvedilol (COREG) 12.5 MG tablet Take 1 tablet by mouth 2 times daily (with meals) 90 tablet 1    lisinopril (PRINIVIL;ZESTRIL) 10 MG tablet Take 1 tablet by mouth daily 90 tablet 1    apixaban (ELIQUIS) 5 MG TABS tablet Take 1 tablet by mouth 2 times daily 180 tablet 1    dilTIAZem (CARDIZEM CD) 180 MG extended release capsule Take 1 capsule by mouth daily 90 capsule 1    Vitamin D (CHOLECALCIFEROL) 1000 UNITS CAPS capsule Take 1 capsule by mouth daily 30 capsule 0    sildenafil (VIAGRA) 100 MG tablet Take 1 tablet by mouth as needed

## 2021-05-25 ENCOUNTER — TELEPHONE (OUTPATIENT)
Dept: SURGERY | Age: 86
End: 2021-05-25

## 2021-06-09 NOTE — TELEPHONE ENCOUNTER
Pt scheduled on 7/8 for Mohs on the lip area. See notes regarding R medial malar cheek. Nursing staff please see note if there's question in scheduling R medial malar cheek (M-D).

## 2021-07-08 ENCOUNTER — PROCEDURE VISIT (OUTPATIENT)
Dept: SURGERY | Age: 86
End: 2021-07-08
Payer: MEDICARE

## 2021-07-08 VITALS — DIASTOLIC BLOOD PRESSURE: 76 MMHG | SYSTOLIC BLOOD PRESSURE: 157 MMHG | TEMPERATURE: 97.8 F

## 2021-07-08 DIAGNOSIS — C44.01 BCC (BASAL CELL CARCINOMA), LIP: Primary | ICD-10-CM

## 2021-07-08 PROCEDURE — 17311 MOHS 1 STAGE H/N/HF/G: CPT | Performed by: DERMATOLOGY

## 2021-07-08 PROCEDURE — 13152 CMPLX RPR E/N/E/L 2.6-7.5 CM: CPT | Performed by: DERMATOLOGY

## 2021-07-08 NOTE — PROGRESS NOTES
MOHS PROCEDURE NOTE    PHYSICIAN:  Laura Bailey. Minna Ocampo MD    ASSISTANT: Edin Taylor RN, Kaelyn Johnson, MA     REFERRING PROVIDER:  Patsy Crespo MD    PREOPERATIVE DIAGNOSIS: Nodular Basal Cell Carcinoma     SPECIFIC MOHS INDICATIONS:  location    AUC SCORIN/9    POSTOPERATIVE DIAGNOSIS: SAME    LOCATION: Right lower cutaneous lip    OPERATIVE PROCEDURE:  MOHS MICROGRAPHIC SURGERY    RECONSTRUCTION OF DEFECT: Complex layered closure    PREOPERATIVE SIZE: 7x5 MM    DEFECT SIZE: 23x13 MM    LENGTH OF REPAIRED WOUND/SIZE OF FLAP/SIZE OF GRAFT:  32 MM    ANESTHESIA:  8mL 1% lidocaine with epinephrine 1:100,000 buffered. EBL:  MINIMAL    DURATION OF PROCEDURE:  1 HOUR    POSTOPERATIVE OBSERVATION: 1 HOUR    SPECIMENS:  SEE MOHS MAP    COMPLICATIONS:  NONE    DESCRIPTION OF PROCEDURE:  The patient was given a mirror, as appropriate, and the biopsy site was identified, marked with a surgical marking pen, and verified by the patient. Options for treatment were discussed and the patient was informed that Mohs surgery was the selected treatment based on its lower recurrence rate, given the features listed above, as compared to other treatment modalities such as excision, radiation, or curettage, and agreed with this treatment plan. Risks and benefits including bruising, swelling, bleeding, infection, nerve injury, recurrence, and scarring were discussed with the patient prior to the procedure and a written consent detailing these and other risks was reviewed with the patient and signed. There was a time out for person and procedure verification. The surgical site was prepped with an antiseptic solution. Application of an antiseptic solution was repeated before each surgical stage. Stage I:  The clinically-apparent tumor was carefully defined and debulked, determining the edge of the surgical excision.     A thin layer of tumor-laden tissue was excised with a narrow margin of normal-appearing skin, using the technique of Mohs. A map was prepared to correspond to the area of skin from which it was excised. Hemostasis was achieved using electrosurgery. The wound was bandaged. The tissue was prepared for the cryostat and sectioned. 1 section(s) prepared. Each section was coded, cut, and stained for microscopic examination. The entire base and margins of the excised piece of tissue were examined by the surgeon. The tissue was examined to the level of subcutaneous fat. No tumor was identified at the peripheral margins of stage I of microscopically controlled surgery. DEFECT MANAGEMENT:    REPAIR DESCRIPTION:  Various closure modalities were discussed with the patient, and it was decided that a complex repair would best preserve normal anatomic and functional relationships. Additional risk of wound dehiscence was discussed. This is a complex closure based on: Undermining    Extensive undermining was performed: the distance of undermining was 15mm, which is equal to or greater than the maximum width of the defect, measured perpendicular to the closure line along at least one entire edge of the defect. The patient was placed on the procedure room table. The area was anesthetized with 1% lidocaine with epinephrine 1:100,000 buffered, was given a sterile prep using Chlorhexidine gluconate 4% solution, and draped in the usual sterile fashion. Recreation and enlargement of the wound was performed by excising cones of tissue via the triangulation technique. The final incision lines were placed with respect for the patient's natural skin tension lines in a linear configuration to avoid functional and aesthetic distortion of adjacent free margins. Following undermining, meticulous hemostasis was obtained with spot monopolar cautery.   Subcutaneous dead space and dermis were closed using 5-0 Vicryl buried subcutaneous interrupted suture and the epidermis was approximated with 6-0 Prolene using running epidermal sutures. WOUND COVERAGE:  The wound was cleaned with normal saline solution, dried off, Aquaphor ointment was applied, and the wound was covered. A dressing was applied for stabilization and light pressure. The patient was given detailed oral and written instructions on postoperative care. There were no complications. The patient left the Unit in good medical condition. FOLLOW-UP:  The patient will return for suture removal in 7 days.

## 2021-07-08 NOTE — PATIENT INSTRUCTIONS
[facility] Office Hours:    Monday-Thursday  7:30 AM-4:30 PM    Friday  9:00 AM-1:00 PM      POST-OPERATIVE CARE FOR STICHES  Bandage change after 48 hours    CARING FOR YOUR SURGICAL SITE  The bandage should remain on and completely dry for 48 hours. Do NOT get the bandage wet. 1. After the first 48 hours, gently remove the remaining part of the bandage. It can be helpful to moisten the bandage edges in the shower. Steri strips may still be on the wound. It is ok, they will fall off slowly with the daily bandage changes. 2. Gently clean the wound daily with mild soap and water. Try to clean off crust and debris. 3. Dry (pat) the area with a clean Q-tip or gauze. 4. Apply a layer of Vaseline/ Aquaphor (or Bacitracin if your doctor recommends) to the wound area only. 5. Cut a piece of Telfa (or any non-stick dressing) to fit just over the wound and secure it with paper tape. If the wound is small you may use a Band- Aid. Keep area covered for a total of 1 week(s). If the dressing comes off or if you have questions, or concerns about the dressing, please call the office for instructions! POST OPERATIVE INSTRUCTIONS    1. Activity: Do not lift anything heavier than a gallon of milk for 1 week. Also, avoid strenuous activity such as running, power walking or contact sports. 2. Eating and drinking: Do not drink alcohol for 48 hours after your procedure. Alcohol increases the chances of bleeding. 3. Medicines   -If you have discomfort, take Acetaminophen (Tylenol or Extra Strength Tylenol). Follow the instructions and warning on the bottle. -If your doctor has prescribed you an Aspirin daily, please keep taking it. Do not take extra Aspirin or medicines containing Aspirin (such as [place] and Excedrin) for 48 hours after your procedure. Bleeding: If bleeding occurs, DO NOT remove the bandage. Put firm pressure on the area with gauze for 20 minutes without peeking.  If the bleeding continues, apply pressure for another 20 minutes. If the bleeding does not stop after you apply pressure, call us right away. If you can not call, go to the nearest emergency room or urgent care facility. What to expect:  You may have these symptoms. They are normal and should get better with time:  1. Swelling. Swelling usually increases for the first 48 hours after your procedure and then begins to improve. Some soreness and redness around your wound. If we worked close to your eyes (forehead, nose, temple, or upper cheeks) your eyes may become swollen and/ or black and blue. 2. Bruising, which could last 1 week or more. 3. Pink and bumpy appearance to the scar. This may happen a few weeks after your procedure. After 4 weeks, you may gently massage the area each day with facial moisturizer or petroleum jelly (Vaseline or Aquaphor). This will help to smooth the skin and improve the appearance of the scar. The color of your scar will fade over time, but it may be pink for several months after the procedure. The scar may take 6 months to 1 year to reach its final color and appearance. 4. \"Spitting\" suture. Occasionally, an inside suture (stitch) does not completely dissolve. When this happens, (generally 4-8 weeks after surgery), it causes a bump or \"pimple\" to form on the scar. This is easily removed and is not at all serious. It does not mean the skin cancer has returned. Contact us if it happens, but do not be alarmed. Vitamin E oil is NOT necessary. A good moisturizer is just as effective. Sunscreen IS necessary. Use at least and SPF 30 sunscreen daily- even in winter    Call us at [phone] right away if you have any of the following symptoms:  -Bleeding that you can not stop (see highlighted area above). -Pain that lasts longer than 48 hours.  -Your wound becomes more painful, red or hot.   -Bruising and swelling that does not begin to improve within the 48 hours or gets worse

## 2021-07-09 ENCOUNTER — TELEPHONE (OUTPATIENT)
Dept: SURGERY | Age: 86
End: 2021-07-09

## 2021-07-09 NOTE — TELEPHONE ENCOUNTER
The patient was in the office on 7/8/2021 for MOHS located on the right lower cutaneous lip with CLC repair. The patient tolerated the procedure well and left the office in good condition. Pain level on post-operative day 1:  none and level of pain (1-10, 10 severe), pt states intermittent pain is tolerable and has been treating with tylenol and ice. Any bleeding episode that required pressure to be held, bandage change or a call to the office or MD?  no     Any other issues?:  yes - Pt states that there was more swelling this time compares to previous MOHS procedures. Pt instructed that the lip and other soft tissues are more prone to swelling, and this should improve within a few days. Instructed to continue with intermittent ice compresses, pt satisfied with this response and verbalizes understanding. A post-operative telephone call was placed at [phone] 7/9/2021 in order to check on the patient's recovery process. The patient reported doing well and had no complaints other than those listed above, if any. All of the patient's questions were answered.

## 2021-07-15 ENCOUNTER — TELEPHONE (OUTPATIENT)
Dept: INTERNAL MEDICINE CLINIC | Age: 86
End: 2021-07-15

## 2021-07-15 ENCOUNTER — OFFICE VISIT (OUTPATIENT)
Dept: SURGERY | Age: 86
End: 2021-07-15

## 2021-07-15 VITALS — TEMPERATURE: 97.8 F

## 2021-07-15 DIAGNOSIS — Z48.02 VISIT FOR SUTURE REMOVAL: Primary | ICD-10-CM

## 2021-07-15 DIAGNOSIS — I48.21 PERMANENT ATRIAL FIBRILLATION (HCC): ICD-10-CM

## 2021-07-15 DIAGNOSIS — E78.2 MIXED HYPERLIPIDEMIA: ICD-10-CM

## 2021-07-15 DIAGNOSIS — Z00.00 ROUTINE GENERAL MEDICAL EXAMINATION AT A HEALTH CARE FACILITY: ICD-10-CM

## 2021-07-15 DIAGNOSIS — N40.0 BENIGN NON-NODULAR PROSTATIC HYPERPLASIA WITHOUT LOWER URINARY TRACT SYMPTOMS: Primary | ICD-10-CM

## 2021-07-15 DIAGNOSIS — E55.9 VITAMIN D DEFICIENCY: ICD-10-CM

## 2021-07-15 PROCEDURE — 99024 POSTOP FOLLOW-UP VISIT: CPT | Performed by: DERMATOLOGY

## 2021-07-15 NOTE — TELEPHONE ENCOUNTER
Pt stopped in   He called to request lab orders but there is no message and no orders  He needs lab orders prior to his 7/21 AWV  If he had those done Monday would the results be available by Wednesday?

## 2021-07-16 DIAGNOSIS — E78.2 MIXED HYPERLIPIDEMIA: ICD-10-CM

## 2021-07-16 DIAGNOSIS — I48.21 PERMANENT ATRIAL FIBRILLATION (HCC): ICD-10-CM

## 2021-07-16 DIAGNOSIS — Z00.00 ROUTINE GENERAL MEDICAL EXAMINATION AT A HEALTH CARE FACILITY: ICD-10-CM

## 2021-07-16 DIAGNOSIS — E55.9 VITAMIN D DEFICIENCY: ICD-10-CM

## 2021-07-16 DIAGNOSIS — N40.0 BENIGN NON-NODULAR PROSTATIC HYPERPLASIA WITHOUT LOWER URINARY TRACT SYMPTOMS: ICD-10-CM

## 2021-07-16 LAB
A/G RATIO: 1.8 (ref 1.1–2.2)
ALBUMIN SERPL-MCNC: 4.1 G/DL (ref 3.4–5)
ALP BLD-CCNC: 84 U/L (ref 40–129)
ALT SERPL-CCNC: 13 U/L (ref 10–40)
ANION GAP SERPL CALCULATED.3IONS-SCNC: 9 MMOL/L (ref 3–16)
AST SERPL-CCNC: 18 U/L (ref 15–37)
BILIRUB SERPL-MCNC: 1.1 MG/DL (ref 0–1)
BUN BLDV-MCNC: 21 MG/DL (ref 7–20)
CALCIUM SERPL-MCNC: 8.6 MG/DL (ref 8.3–10.6)
CHLORIDE BLD-SCNC: 104 MMOL/L (ref 99–110)
CHOLESTEROL, FASTING: 122 MG/DL (ref 0–199)
CO2: 28 MMOL/L (ref 21–32)
CREAT SERPL-MCNC: 1.1 MG/DL (ref 0.8–1.3)
GFR AFRICAN AMERICAN: >60
GFR NON-AFRICAN AMERICAN: >60
GLOBULIN: 2.3 G/DL
GLUCOSE BLD-MCNC: 101 MG/DL (ref 70–99)
HCT VFR BLD CALC: 40.4 % (ref 40.5–52.5)
HDLC SERPL-MCNC: 47 MG/DL (ref 40–60)
HEMOGLOBIN: 13.8 G/DL (ref 13.5–17.5)
LDL CHOLESTEROL CALCULATED: 66 MG/DL
MCH RBC QN AUTO: 31.1 PG (ref 26–34)
MCHC RBC AUTO-ENTMCNC: 34.1 G/DL (ref 31–36)
MCV RBC AUTO: 91.3 FL (ref 80–100)
PDW BLD-RTO: 15.2 % (ref 12.4–15.4)
PLATELET # BLD: 165 K/UL (ref 135–450)
PMV BLD AUTO: 9.2 FL (ref 5–10.5)
POTASSIUM SERPL-SCNC: 5.3 MMOL/L (ref 3.5–5.1)
PROSTATE SPECIFIC ANTIGEN: 11.82 NG/ML (ref 0–4)
RBC # BLD: 4.42 M/UL (ref 4.2–5.9)
SODIUM BLD-SCNC: 141 MMOL/L (ref 136–145)
TOTAL PROTEIN: 6.4 G/DL (ref 6.4–8.2)
TRIGLYCERIDE, FASTING: 46 MG/DL (ref 0–150)
TSH REFLEX: 3.83 UIU/ML (ref 0.27–4.2)
VITAMIN D 25-HYDROXY: 39.6 NG/ML
VLDLC SERPL CALC-MCNC: 9 MG/DL
WBC # BLD: 4.7 K/UL (ref 4–11)

## 2021-07-19 NOTE — PROGRESS NOTES
S:  The patient is here for suture removal s/p Mohs surgery on the right lower cutaneous lip and Complex layered closure repair, 1 week(s) ago. The site appears well-healed without signs of infection (redness, pain or discharge). The sutures were removed. Looks good. Still needs some healing at center. Wound care and activity instructions given. The patient was scheduled for follow-up prn for scar/wound check. The patient was scheduled for f/u with General Dermatology per their instructions.

## 2021-07-21 ENCOUNTER — OFFICE VISIT (OUTPATIENT)
Dept: INTERNAL MEDICINE CLINIC | Age: 86
End: 2021-07-21
Payer: MEDICARE

## 2021-07-21 VITALS
DIASTOLIC BLOOD PRESSURE: 70 MMHG | OXYGEN SATURATION: 97 % | BODY MASS INDEX: 31.78 KG/M2 | WEIGHT: 222 LBS | SYSTOLIC BLOOD PRESSURE: 122 MMHG | HEART RATE: 80 BPM | HEIGHT: 70 IN

## 2021-07-21 DIAGNOSIS — N40.0 BENIGN NON-NODULAR PROSTATIC HYPERPLASIA WITHOUT LOWER URINARY TRACT SYMPTOMS: ICD-10-CM

## 2021-07-21 DIAGNOSIS — Z00.00 ROUTINE GENERAL MEDICAL EXAMINATION AT A HEALTH CARE FACILITY: ICD-10-CM

## 2021-07-21 DIAGNOSIS — I48.21 PERMANENT ATRIAL FIBRILLATION (HCC): Primary | ICD-10-CM

## 2021-07-21 DIAGNOSIS — I25.10 CORONARY ARTERY DISEASE INVOLVING NATIVE CORONARY ARTERY OF NATIVE HEART WITHOUT ANGINA PECTORIS: ICD-10-CM

## 2021-07-21 DIAGNOSIS — E78.2 MIXED HYPERLIPIDEMIA: ICD-10-CM

## 2021-07-21 DIAGNOSIS — I48.21 PERMANENT ATRIAL FIBRILLATION (HCC): ICD-10-CM

## 2021-07-21 DIAGNOSIS — Z00.00 ENCOUNTER FOR ANNUAL WELLNESS VISIT (AWV) IN MEDICARE PATIENT: ICD-10-CM

## 2021-07-21 DIAGNOSIS — K51.00 ULCERATIVE PANCOLITIS (HCC): ICD-10-CM

## 2021-07-21 LAB
A/G RATIO: 2 (ref 1.1–2.2)
ALBUMIN SERPL-MCNC: 4.4 G/DL (ref 3.4–5)
ALP BLD-CCNC: 83 U/L (ref 40–129)
ALT SERPL-CCNC: 13 U/L (ref 10–40)
ANION GAP SERPL CALCULATED.3IONS-SCNC: 12 MMOL/L (ref 3–16)
AST SERPL-CCNC: 17 U/L (ref 15–37)
BILIRUB SERPL-MCNC: 1.1 MG/DL (ref 0–1)
BUN BLDV-MCNC: 23 MG/DL (ref 7–20)
CALCIUM SERPL-MCNC: 9 MG/DL (ref 8.3–10.6)
CHLORIDE BLD-SCNC: 103 MMOL/L (ref 99–110)
CO2: 28 MMOL/L (ref 21–32)
CREAT SERPL-MCNC: 1.2 MG/DL (ref 0.8–1.3)
GFR AFRICAN AMERICAN: >60
GFR NON-AFRICAN AMERICAN: 57
GLOBULIN: 2.2 G/DL
GLUCOSE FASTING: 90 MG/DL (ref 70–99)
POTASSIUM SERPL-SCNC: 5 MMOL/L (ref 3.5–5.1)
SODIUM BLD-SCNC: 143 MMOL/L (ref 136–145)
TOTAL PROTEIN: 6.6 G/DL (ref 6.4–8.2)

## 2021-07-21 PROCEDURE — 4040F PNEUMOC VAC/ADMIN/RCVD: CPT | Performed by: NURSE PRACTITIONER

## 2021-07-21 PROCEDURE — G0439 PPPS, SUBSEQ VISIT: HCPCS | Performed by: NURSE PRACTITIONER

## 2021-07-21 PROCEDURE — 1123F ACP DISCUSS/DSCN MKR DOCD: CPT | Performed by: NURSE PRACTITIONER

## 2021-07-21 ASSESSMENT — ENCOUNTER SYMPTOMS
SORE THROAT: 0
VOMITING: 0
WHEEZING: 0
BACK PAIN: 0
SINUS PRESSURE: 0
EYE REDNESS: 0
ABDOMINAL PAIN: 0
CONSTIPATION: 0
CHEST TIGHTNESS: 0
COUGH: 0
DIARRHEA: 0
EYE ITCHING: 0
SHORTNESS OF BREATH: 0
RHINORRHEA: 0
BLOOD IN STOOL: 0
COLOR CHANGE: 0
NAUSEA: 0

## 2021-07-21 ASSESSMENT — LIFESTYLE VARIABLES: HOW OFTEN DO YOU HAVE A DRINK CONTAINING ALCOHOL: 0

## 2021-07-21 ASSESSMENT — PATIENT HEALTH QUESTIONNAIRE - PHQ9
SUM OF ALL RESPONSES TO PHQ9 QUESTIONS 1 & 2: 0
SUM OF ALL RESPONSES TO PHQ QUESTIONS 1-9: 0
SUM OF ALL RESPONSES TO PHQ QUESTIONS 1-9: 0
2. FEELING DOWN, DEPRESSED OR HOPELESS: 0
SUM OF ALL RESPONSES TO PHQ QUESTIONS 1-9: 0
1. LITTLE INTEREST OR PLEASURE IN DOING THINGS: 0

## 2021-07-21 NOTE — PROGRESS NOTES
Alice Muller (:  1934) is a 80 y.o. male,{New vs Established:[id number]::\"Established patient\"}, here for evaluation of the following chief complaint(s):  Medicare AW      ASSESSMENT/PLAN:  {There are no diagnoses linked to this encounter. (Refresh or delete this SmartLink)}    No follow-ups on file. SUBJECTIVE/OBJECTIVE:  HPI    Current Outpatient Medications   Medication Sig Dispense Refill    carvedilol (COREG) 12.5 MG tablet Take 1 tablet by mouth 2 times daily (with meals) 90 tablet 1    lisinopril (PRINIVIL;ZESTRIL) 10 MG tablet Take 1 tablet by mouth daily 90 tablet 1    apixaban (ELIQUIS) 5 MG TABS tablet Take 1 tablet by mouth 2 times daily 180 tablet 1    dilTIAZem (CARDIZEM CD) 180 MG extended release capsule Take 1 capsule by mouth daily 90 capsule 1    Vitamin D (CHOLECALCIFEROL) 1000 UNITS CAPS capsule Take 1 capsule by mouth daily 30 capsule 0    sildenafil (VIAGRA) 100 MG tablet Take 1 tablet by mouth as needed for Erectile Dysfunction 16 tablet 2    simvastatin (ZOCOR) 20 MG tablet TAKE 1 TABLET IN THE EVENING 90 tablet 1    latanoprost (XALATAN) 0.005 % ophthalmic solution Place 1 drop into both eyes nightly.  aspirin EC 81 MG EC tablet Take 1 tablet by mouth daily. 30 tablet 12    mercaptopurine (PURINETHOL) 50 MG tablet Take 50 mg by mouth daily.  tamsulosin (FLOMAX) 0.4 MG capsule Take 1 capsule by mouth daily for 5 doses 5 capsule 0     No current facility-administered medications for this visit. Review of Systems    Vitals:    21 0905   BP: 122/70   Site: Left Upper Arm   Position: Sitting   Cuff Size: Large Adult   Pulse: 80   SpO2: 97%   Weight: 222 lb (100.7 kg)   Height: 5' 10\" (1.778 m)       Physical Exam      {Time Documentation Optional:[id number]}      An electronic signature was used to authenticate this note.     --Jesus Barfield, APRN - CNP

## 2021-07-21 NOTE — ASSESSMENT & PLAN NOTE
Stable, controlled  No changes  Continue current treatment plan   Encouraged to continue taking eliquis and using compression stockings

## 2021-07-21 NOTE — PATIENT INSTRUCTIONS
Personalized Preventive Plan for Marco Hiss - 7/21/2021  Medicare offers a range of preventive health benefits. Some of the tests and screenings are paid in full while other may be subject to a deductible, co-insurance, and/or copay. Some of these benefits include a comprehensive review of your medical history including lifestyle, illnesses that may run in your family, and various assessments and screenings as appropriate. After reviewing your medical record and screening and assessments performed today your provider may have ordered immunizations, labs, imaging, and/or referrals for you. A list of these orders (if applicable) as well as your Preventive Care list are included within your After Visit Summary for your review. Other Preventive Recommendations:    · A preventive eye exam performed by an eye specialist is recommended every 1-2 years to screen for glaucoma; cataracts, macular degeneration, and other eye disorders. · A preventive dental visit is recommended every 6 months. · Try to get at least 150 minutes of exercise per week or 10,000 steps per day on a pedometer . · Order or download the FREE \"Exercise & Physical Activity: Your Everyday Guide\" from [employer]. Call 9-845.793.7946 or search [employer] online. · You need 1237-8479 mg of calcium and 2635-1464 IU of vitamin D per day. It is possible to meet your calcium requirement with diet alone, but a vitamin D supplement is usually necessary to meet this goal.  · When exposed to the sun, use a sunscreen that protects against both UVA and UVB radiation with an SPF of 30 or greater. Reapply every 2 to 3 hours or after sweating, drying off with a towel, or swimming. · Always wear a seat belt when traveling in a car. Always wear a helmet when riding a bicycle or motorcycle.

## 2021-07-21 NOTE — ASSESSMENT & PLAN NOTE
PSA remains elevated, but continues to follow with urology and PSA at baseline with no new symptoms.

## 2021-07-21 NOTE — PROGRESS NOTES
Medicare Annual Wellness Visit  Name: Rupal Velazquez Date: 2021   MRN: [id number] Sex: Male   Age: 80 y.o. Ethnicity: Declined   : 1934 Race: White (non-)      Vazquez Hobbs is here for Medicare AWV  he is doing very well. He believes that he has impacted wax in his left ear. He states that overall he is doing well. He is having no issues with his mediations. He is following up with his cardiologist and his urologist. His PSA remains stable and is not having any new issues. Screenings for behavioral, psychosocial and functional/safety risks, and cognitive dysfunction are all negative except as indicated below. These results, as well as other patient data from the [address] form, are documented in Flowsheets linked to this Encounter. Allergies   Allergen Reactions    Iodine      **IVP DYES**         Prior to Visit Medications    Medication Sig Taking? Authorizing Provider   carvedilol (COREG) 12.5 MG tablet Take 1 tablet by mouth 2 times daily (with meals) Yes De Coot MD   lisinopril (PRINIVIL;ZESTRIL) 10 MG tablet Take 1 tablet by mouth daily Yes De Coto MD   apixaban (ELIQUIS) 5 MG TABS tablet Take 1 tablet by mouth 2 times daily Yes De Coto MD   dilTIAZem (CARDIZEM CD) 180 MG extended release capsule Take 1 capsule by mouth daily Yes De Coto MD   Vitamin D (CHOLECALCIFEROL) 1000 UNITS CAPS capsule Take 1 capsule by mouth daily Yes eD Coto MD   sildenafil (VIAGRA) 100 MG tablet Take 1 tablet by mouth as needed for Erectile Dysfunction Yes De Coto MD   simvastatin (ZOCOR) 20 MG tablet TAKE 1 TABLET IN THE EVENING Yes De Coto MD   latanoprost (XALATAN) 0.005 % ophthalmic solution Place 1 drop into both eyes nightly. Yes Historical Provider, MD   aspirin EC 81 MG EC tablet Take 1 tablet by mouth daily.  Yes De Coto MD   mercaptopurine (PURINETHOL) 50 MG tablet Take 50 mg by mouth daily.  Yes Historical Provider, MD   tamsulosin (FLOMAX) 0.4 MG capsule Take 1 capsule by mouth daily for 5 doses  Lissett Bonilla MD         Past Medical History:   Diagnosis Date    Aortic regurgitation     Atrial fibrillation ([address]     BPH     Hyperlipidemia     Hypertension     Superficial phlebitis     Ulcerative colitis     Urinary retention        Past Surgical History:   Procedure Laterality Date    COLONOSCOPY  10/2011    COLONOSCOPY  09/20/2013    COLONOSCOPY  10/16/2015    COLONOSCOPY N/A 10/11/2019    COLONOSCOPY CONTROL HEMORRHAGE performed by Karina Boyce MD at [address]  10/2/2020    COLONOSCOPY WITH BIOPSY performed by Karina Boyce MD at [address]  10/2/2020    COLONOSCOPY POLYPECTOMY SNARE/COLD BIOPSY performed by Karina Boyce MD at [address] [facility]      inguinal    MOHS SURGERY  07/25/2018    Left lower lip, left cheek, right cheek    MOHS SURGERY N/A 7/6/2020    FULL THICKNESS SKIN GRAFT TO THE NOSE (2.5 X 2 CM), ROTATIONAL ADVANCEMENT FLAP (3X4.2 CM) TO THE LIP (3X2 CM) performed by Jones Damon MD at [facility]      multiple times    PARATHYROIDECTOMY      SALIVARY GLAND SURGERY      parotid         Family History   Problem Relation Age of Onset    Other Mother         'intestine shut down'    Kidney Disease Father        CareTeam (Including outside providers/suppliers regularly involved in providing care):   Patient Care Team:  LIZZ Galan CNP as PCP - General (Nurse Practitioner)  LIZZ Galan CNP as PCP - [facility] Empaneled Provider    Wt Readings from Last 3 Encounters:   07/21/21 222 lb (100.7 kg)   05/05/21 226 lb (102.5 kg)   11/10/20 221 lb 4.8 oz (100.4 kg)     Vitals:    07/21/21 0905   BP: 122/70   Site: Left Upper Arm   Position: Sitting   Cuff Size: Large Adult   Pulse: 80   SpO2: 97%   Weight: 222 lb (100.7 kg)   Height: 5' 10\" (1.778 m)     Body mass index is 31.85 kg/m². Based upon direct observation of the patient, evaluation of cognition reveals recent and remote memory intact. Review of Systems   Constitutional: Negative for chills, fatigue and fever. HENT: Negative for congestion, ear pain, postnasal drip, rhinorrhea, sinus pressure, sneezing and sore throat. Eyes: Negative for redness and itching. Respiratory: Negative for cough, chest tightness, shortness of breath and wheezing. Cardiovascular: Negative for chest pain and palpitations. Gastrointestinal: Negative for abdominal pain, blood in stool, constipation, diarrhea, nausea and vomiting. Endocrine: Negative for cold intolerance and heat intolerance. Genitourinary: Negative for difficulty urinating, dysuria, flank pain, frequency, hematuria and urgency. Musculoskeletal: Negative for arthralgias, back pain, joint swelling and myalgias. Skin: Negative for color change, pallor, rash and wound. Allergic/Immunologic: Negative for environmental allergies and food allergies. Neurological: Negative for dizziness, seizures, syncope, weakness, light-headedness, numbness and headaches. Hematological: Negative for adenopathy. Does not bruise/bleed easily. Psychiatric/Behavioral: Negative for confusion, sleep disturbance and suicidal ideas. The patient is not nervous/anxious and is not hyperactive.         General Appearance: alert and oriented to person, place and time, well developed and well- nourished, in no acute distress  Skin: warm and dry, no rash or erythema  Head: normocephalic and atraumatic  Eyes: pupils equal, round, and reactive to light, extraocular eye movements intact, conjunctivae normal  ENT: tympanic membrane, external ear and ear canal normal bilaterally, nose without deformity, nasal mucosa and turbinates normal without polyps  Neck: supple and non-tender without mass, no thyromegaly or thyroid nodules, no cervical lymphadenopathy  Pulmonary/Chest: clear to auscultation bilaterally- no wheezes, rales or rhonchi, normal air movement, no respiratory distress  Cardiovascular: normal rate, regular rhythm, normal S1 and S2, no murmurs, rubs, clicks, or gallops, distal pulses intact, no carotid bruits  Abdomen: soft, non-tender, non-distended, normal bowel sounds, no masses or organomegaly  Extremities: no cyanosis, clubbing or edema  Musculoskeletal: normal range of motion, no joint swelling, deformity or tenderness  Neurologic: reflexes normal and symmetric, no cranial nerve deficit, gait, coordination and speech normal    Patient's complete Health Risk Assessment and screening values have been reviewed and are found in Flowsheets. The following problems were reviewed today and where indicated follow up appointments were made and/or referrals ordered. Positive Risk Factor Screenings with Interventions:         Substance History:  Social History     Tobacco History     Smoking Status  Current Some Day Smoker Smoking Frequency  1 pack/day for 38 years (45 pk yrs) Smoking Tobacco Type  Cigars, Cigarettes    Smokeless Tobacco Use  Never Used          Alcohol History     Alcohol Use Status  Yes Drinks/Week  0 Standard drinks or equivalent per week Amount  0.0 standard drinks of alcohol/wk Comment  socially          Drug Use     Drug Use Status  No          Sexual Activity     Sexually Active  Yes Partners  Female               Alcohol Screening:       A score of 8 or more is associated with harmful or hazardous drinking. A score of 13 or more in women, and 15 or more in men, is likely to indicate alcohol dependence. Substance Abuse Interventions:  · none    General Health and ACP:  General  In general, how would you say your health is?: Fair  In the past 7 days, have you experienced any of the following?  New or Increased Pain, New or Increased Fatigue, Loneliness, Social Isolation, Stress or Anger?: None of These  Do you get the Aged Out     Recommendations for Preventive Services Due: see orders and patient instructions/AVS.  . Recommended screening schedule for the next 5-10 years is provided to the patient in written form: see Patient Instructions/AVS.    Encounter for annual wellness visit (AWV) in Medicare patient   Well exam in office  Labs reviewed  HM reviewed      Benign non-nodular prostatic hyperplasia without lower urinary tract symptoms   PSA remains elevated, but continues to follow with urology and PSA at baseline with no new symptoms. Permanent atrial fibrillation   Stable, controlled  No changes  Continue current treatment plan   Encouraged to continue taking eliquis and using compression stockings      Hyperlipidemia   Stable, controlled  No changes  Continue current treatment plan       CAD (coronary artery disease)   Stable, controlled  No changes  Continue current treatment plan         Ana Lopez was seen today for medicare awv. Diagnoses and all orders for this visit:    Permanent atrial fibrillation ([address]  -     Comprehensive Metabolic Panel, Fasting;  Future    Ulcerative pancolitis ([address]    Routine general medical examination at a health care facility    Encounter for annual wellness visit (AWV) in Medicare patient    Benign non-nodular prostatic hyperplasia without lower urinary tract symptoms    Mixed hyperlipidemia    Coronary artery disease involving native coronary artery of native heart without angina pectoris

## 2021-08-13 ENCOUNTER — TELEPHONE (OUTPATIENT)
Dept: INTERNAL MEDICINE CLINIC | Age: 86
End: 2021-08-13

## 2021-08-13 DIAGNOSIS — R26.89 BALANCE DISORDER: Primary | ICD-10-CM

## 2021-08-13 NOTE — TELEPHONE ENCOUNTER
Pt would like a referral to a balancing class  A friend recommended it to him  He has had recent falls and is really interested   PT and OT at [facility]  Fax: [phone]    Please contact

## 2021-08-20 ENCOUNTER — TELEPHONE (OUTPATIENT)
Dept: INTERNAL MEDICINE CLINIC | Age: 86
End: 2021-08-20

## 2021-11-17 ENCOUNTER — NURSE ONLY (OUTPATIENT)
Dept: INTERNAL MEDICINE CLINIC | Age: 86
End: 2021-11-17
Payer: MEDICARE

## 2021-11-17 DIAGNOSIS — Z23 NEED FOR INFLUENZA VACCINATION: Primary | ICD-10-CM

## 2021-11-17 PROCEDURE — G0008 ADMIN INFLUENZA VIRUS VAC: HCPCS | Performed by: NURSE PRACTITIONER

## 2021-11-17 PROCEDURE — 90694 VACC AIIV4 NO PRSRV 0.5ML IM: CPT | Performed by: NURSE PRACTITIONER

## 2022-04-25 ENCOUNTER — OFFICE VISIT (OUTPATIENT)
Dept: FAMILY MEDICINE CLINIC | Age: 87
End: 2022-04-25
Payer: MEDICARE

## 2022-04-25 VITALS
WEIGHT: 234 LBS | BODY MASS INDEX: 33.58 KG/M2 | TEMPERATURE: 97.1 F | DIASTOLIC BLOOD PRESSURE: 78 MMHG | HEART RATE: 73 BPM | OXYGEN SATURATION: 98 % | SYSTOLIC BLOOD PRESSURE: 122 MMHG

## 2022-04-25 DIAGNOSIS — N18.32 STAGE 3B CHRONIC KIDNEY DISEASE (HCC): ICD-10-CM

## 2022-04-25 DIAGNOSIS — M79.89 LEFT LEG SWELLING: ICD-10-CM

## 2022-04-25 DIAGNOSIS — I48.21 PERMANENT ATRIAL FIBRILLATION (HCC): ICD-10-CM

## 2022-04-25 DIAGNOSIS — K51.00 ULCERATIVE PANCOLITIS (HCC): ICD-10-CM

## 2022-04-25 DIAGNOSIS — M79.89 LEFT LEG SWELLING: Primary | ICD-10-CM

## 2022-04-25 PROBLEM — N18.30 CHRONIC RENAL DISEASE, STAGE III (HCC): Status: ACTIVE | Noted: 2022-04-25

## 2022-04-25 LAB
A/G RATIO: 1.9 (ref 1.1–2.2)
ALBUMIN SERPL-MCNC: 4.1 G/DL (ref 3.4–5)
ALP BLD-CCNC: 89 U/L (ref 40–129)
ALT SERPL-CCNC: 18 U/L (ref 10–40)
ANION GAP SERPL CALCULATED.3IONS-SCNC: 13 MMOL/L (ref 3–16)
AST SERPL-CCNC: 21 U/L (ref 15–37)
BASOPHILS ABSOLUTE: 0 K/UL (ref 0–0.2)
BASOPHILS RELATIVE PERCENT: 0.6 %
BILIRUB SERPL-MCNC: 1.4 MG/DL (ref 0–1)
BUN BLDV-MCNC: 24 MG/DL (ref 7–20)
CALCIUM SERPL-MCNC: 8.8 MG/DL (ref 8.3–10.6)
CHLORIDE BLD-SCNC: 103 MMOL/L (ref 99–110)
CO2: 25 MMOL/L (ref 21–32)
CREAT SERPL-MCNC: 1.2 MG/DL (ref 0.8–1.3)
D DIMER: 631 NG/ML DDU (ref 0–229)
EOSINOPHILS ABSOLUTE: 0.2 K/UL (ref 0–0.6)
EOSINOPHILS RELATIVE PERCENT: 4.4 %
GFR AFRICAN AMERICAN: >60
GFR NON-AFRICAN AMERICAN: 57
GLUCOSE FASTING: 133 MG/DL (ref 70–99)
HCT VFR BLD CALC: 37 % (ref 40.5–52.5)
HEMOGLOBIN: 12.4 G/DL (ref 13.5–17.5)
LYMPHOCYTES ABSOLUTE: 0.6 K/UL (ref 1–5.1)
LYMPHOCYTES RELATIVE PERCENT: 11.4 %
MCH RBC QN AUTO: 30.1 PG (ref 26–34)
MCHC RBC AUTO-ENTMCNC: 33.4 G/DL (ref 31–36)
MCV RBC AUTO: 90 FL (ref 80–100)
MONOCYTES ABSOLUTE: 0.5 K/UL (ref 0–1.3)
MONOCYTES RELATIVE PERCENT: 9.9 %
NEUTROPHILS ABSOLUTE: 3.6 K/UL (ref 1.7–7.7)
NEUTROPHILS RELATIVE PERCENT: 73.7 %
PDW BLD-RTO: 15.9 % (ref 12.4–15.4)
PLATELET # BLD: 202 K/UL (ref 135–450)
PMV BLD AUTO: 8.7 FL (ref 5–10.5)
POTASSIUM SERPL-SCNC: 5 MMOL/L (ref 3.5–5.1)
PRO-BNP: 1022 PG/ML (ref 0–449)
RBC # BLD: 4.11 M/UL (ref 4.2–5.9)
SODIUM BLD-SCNC: 141 MMOL/L (ref 136–145)
TOTAL PROTEIN: 6.3 G/DL (ref 6.4–8.2)
WBC # BLD: 4.9 K/UL (ref 4–11)

## 2022-04-25 PROCEDURE — G8417 CALC BMI ABV UP PARAM F/U: HCPCS | Performed by: NURSE PRACTITIONER

## 2022-04-25 PROCEDURE — 1123F ACP DISCUSS/DSCN MKR DOCD: CPT | Performed by: NURSE PRACTITIONER

## 2022-04-25 PROCEDURE — 4040F PNEUMOC VAC/ADMIN/RCVD: CPT | Performed by: NURSE PRACTITIONER

## 2022-04-25 PROCEDURE — G8427 DOCREV CUR MEDS BY ELIG CLIN: HCPCS | Performed by: NURSE PRACTITIONER

## 2022-04-25 PROCEDURE — 4004F PT TOBACCO SCREEN RCVD TLK: CPT | Performed by: NURSE PRACTITIONER

## 2022-04-25 PROCEDURE — 99214 OFFICE O/P EST MOD 30 MIN: CPT | Performed by: NURSE PRACTITIONER

## 2022-04-25 RX ORDER — TIMOLOL MALEATE 10 MG/1
10 TABLET ORAL DAILY
COMMUNITY
End: 2022-06-13

## 2022-04-25 ASSESSMENT — ENCOUNTER SYMPTOMS
CHEST TIGHTNESS: 0
SHORTNESS OF BREATH: 0

## 2022-04-25 NOTE — ASSESSMENT & PLAN NOTE
Left LE swelling with bruising. Labs ordered. Continue to wear compression stockings and elevate when ever possible.

## 2022-04-25 NOTE — PROGRESS NOTES
Nita Clark (:  1934) is a 80 y.o. male,{New vs Established:[id number]::\"Established patient\"}, here for evaluation of the following chief complaint(s):  Leg Swelling (left leg swelling x2 weeks with bruising)      ASSESSMENT/PLAN:  {There are no diagnoses linked to this encounter. (Refresh or delete this SmartLink)}    No follow-ups on file. SUBJECTIVE/OBJECTIVE:  2 years ago a-fib dx    2 weeks ago, had a fall   Got x-ray, no fracture noted    Denies pain. Feels tight after sitting for a while      Wears compression stockings, bilaterally. Wearing 30% of the time. Current Outpatient Medications   Medication Sig Dispense Refill    timolol (BLOCADREN) 10 MG tablet Take 10 mg by mouth daily . 5 daily      tamsulosin (FLOMAX) 0.4 MG capsule Take 1 capsule by mouth daily for 5 doses 5 capsule 0    carvedilol (COREG) 12.5 MG tablet Take 1 tablet by mouth 2 times daily (with meals) 90 tablet 1    lisinopril (PRINIVIL;ZESTRIL) 10 MG tablet Take 1 tablet by mouth daily 90 tablet 1    apixaban (ELIQUIS) 5 MG TABS tablet Take 1 tablet by mouth 2 times daily 180 tablet 1    dilTIAZem (CARDIZEM CD) 180 MG extended release capsule Take 1 capsule by mouth daily 90 capsule 1    Vitamin D (CHOLECALCIFEROL) 1000 UNITS CAPS capsule Take 1 capsule by mouth daily 30 capsule 0    sildenafil (VIAGRA) 100 MG tablet Take 1 tablet by mouth as needed for Erectile Dysfunction 16 tablet 2    simvastatin (ZOCOR) 20 MG tablet TAKE 1 TABLET IN THE EVENING 90 tablet 1    latanoprost (XALATAN) 0.005 % ophthalmic solution Place 1 drop into both eyes nightly.  aspirin EC 81 MG EC tablet Take 1 tablet by mouth daily. 30 tablet 12    mercaptopurine (PURINETHOL) 50 MG tablet Take 50 mg by mouth daily. No current facility-administered medications for this visit.        Review of Systems    Vitals:    22 1504   BP: 122/78   Site: Left Upper Arm   Position: Sitting   Cuff Size: Medium Adult   Pulse: 73   Temp: 97.1 °F (36.2 °C)   SpO2: 98%   Weight: 234 lb (106.1 kg)       Physical Exam      {Time Documentation Optional:[id number]}      An electronic signature was used to authenticate this note.     --Elen Thrasher, LIZZ - CNP

## 2022-04-25 NOTE — PROGRESS NOTES
Lila Park (:  1934) is a 80 y.o. male,Established patient, here for evaluation of the following chief complaint(s):  Leg Swelling (left leg swelling x2 weeks with bruising)      ASSESSMENT/PLAN:  1. Left leg swelling  Assessment & Plan:   Left LE swelling with bruising. Labs ordered. Continue to wear compression stockings and elevate when ever possible. Orders:  -     D-Dimer, Quantitative; Future  -     Brain Natriuretic Peptide; Future  -     CBC with Auto Differential; Future  -     Comprehensive Metabolic Panel, Fasting; Future  2. Stage 3b chronic kidney disease ([facility] [address]  Assessment & Plan:   Monitored by specialist- no acute findings meriting change in the plan  3. Ulcerative pancolitis ([facility] [address]  4. Permanent atrial fibrillation [facility])  Assessment & Plan:   Monitored by specialist- no acute findings meriting change in the plan      No follow-ups on file. SUBJECTIVE/OBJECTIVE:  Lila Prak is here today for concern for swelling in his left leg. He reports a fall into a bathtub 2 weeks ago. Initially had no pain. Later that night had pain in the left leg. Went to the ER in [place]. Had paperwork with him, got x-ray which showed no fractures. Since that time his left leg has been swollen. He has bruising on the lateral aspect of his knee. He denies pain, but does report tightness, especially after sitting for awhile. Denies any other concerns. Complaint with medications. Current Outpatient Medications   Medication Sig Dispense Refill    timolol (BLOCADREN) 10 MG tablet Take 10 mg by mouth daily . 5 daily      tamsulosin (FLOMAX) 0.4 MG capsule Take 1 capsule by mouth daily for 5 doses 5 capsule 0    carvedilol (COREG) 12.5 MG tablet Take 1 tablet by mouth 2 times daily (with meals) 90 tablet 1    lisinopril (PRINIVIL;ZESTRIL) 10 MG tablet Take 1 tablet by mouth daily 90 tablet 1    apixaban (ELIQUIS) 5 MG TABS tablet Take 1 tablet by mouth 2 times daily 180 tablet 1  dilTIAZem (CARDIZEM CD) 180 MG extended release capsule Take 1 capsule by mouth daily 90 capsule 1    Vitamin D (CHOLECALCIFEROL) 1000 UNITS CAPS capsule Take 1 capsule by mouth daily 30 capsule 0    sildenafil (VIAGRA) 100 MG tablet Take 1 tablet by mouth as needed for Erectile Dysfunction 16 tablet 2    simvastatin (ZOCOR) 20 MG tablet TAKE 1 TABLET IN THE EVENING 90 tablet 1    latanoprost (XALATAN) 0.005 % ophthalmic solution Place 1 drop into both eyes nightly.  aspirin EC 81 MG EC tablet Take 1 tablet by mouth daily. 30 tablet 12    mercaptopurine (PURINETHOL) 50 MG tablet Take 50 mg by mouth daily. No current facility-administered medications for this visit. Review of Systems   Constitutional: Negative for activity change. Respiratory: Negative for chest tightness and shortness of breath. Cardiovascular: Positive for leg swelling. Negative for chest pain and palpitations. Neurological: Negative for syncope, light-headedness and numbness. All other systems reviewed and are negative. Vitals:    04/25/22 1504   BP: 122/78   Site: Left Upper Arm   Position: Sitting   Cuff Size: Medium Adult   Pulse: 73   Temp: 97.1 °F (36.2 °C)   SpO2: 98%   Weight: 234 lb (106.1 kg)       Physical Exam  Constitutional:       Appearance: Normal appearance. HENT:      Head: Normocephalic and atraumatic. Nose: Nose normal.   Eyes:      Extraocular Movements: Extraocular movements intact. Conjunctiva/sclera: Conjunctivae normal.      Pupils: Pupils are equal, round, and reactive to light. Pulmonary:      Effort: Pulmonary effort is normal.   Musculoskeletal:         General: Swelling present. Normal range of motion. Cervical back: Normal range of motion. Right lower leg: Edema present. Left lower leg: Edema present. Comments: LLE significant;y more swollen that RLE   Skin:     Coloration: Skin is not jaundiced or pale.       Findings: No bruising, erythema, lesion or rash. Neurological:      Mental Status: He is alert and oriented to person, place, and time. Mental status is at baseline. Psychiatric:         Mood and Affect: Mood normal.         Behavior: Behavior normal.         Thought Content: Thought content normal.         Judgment: Judgment normal.                 An electronic signature was used to authenticate this note.     --LIZZ Eastman - CNP

## 2022-04-26 ENCOUNTER — HOSPITAL ENCOUNTER (OUTPATIENT)
Dept: VASCULAR LAB | Age: 87
Discharge: HOME | End: 2022-04-26
Payer: MEDICARE

## 2022-04-26 DIAGNOSIS — M79.89 LEFT LEG SWELLING: ICD-10-CM

## 2022-04-26 DIAGNOSIS — R79.89 ELEVATED D-DIMER: ICD-10-CM

## 2022-04-26 DIAGNOSIS — R79.89 ELEVATED D-DIMER: Primary | ICD-10-CM

## 2022-04-26 PROCEDURE — 93971 EXTREMITY STUDY: CPT

## 2022-06-13 ENCOUNTER — OFFICE VISIT (OUTPATIENT)
Dept: SURGERY | Age: 87
End: 2022-06-13
Payer: MEDICARE

## 2022-06-13 VITALS
RESPIRATION RATE: 16 BRPM | WEIGHT: 232 LBS | DIASTOLIC BLOOD PRESSURE: 71 MMHG | HEIGHT: 70 IN | HEART RATE: 56 BPM | SYSTOLIC BLOOD PRESSURE: 132 MMHG | TEMPERATURE: 98.4 F | BODY MASS INDEX: 33.21 KG/M2 | OXYGEN SATURATION: 96 %

## 2022-06-13 DIAGNOSIS — Z98.890 MOHS DEFECT: Primary | ICD-10-CM

## 2022-06-13 DIAGNOSIS — Z09 POSTOP CHECK: ICD-10-CM

## 2022-06-13 DIAGNOSIS — L98.8 MOHS DEFECT: Primary | ICD-10-CM

## 2022-06-13 PROCEDURE — 1123F ACP DISCUSS/DSCN MKR DOCD: CPT | Performed by: SURGERY

## 2022-06-13 PROCEDURE — 4004F PT TOBACCO SCREEN RCVD TLK: CPT | Performed by: SURGERY

## 2022-06-13 PROCEDURE — G8417 CALC BMI ABV UP PARAM F/U: HCPCS | Performed by: SURGERY

## 2022-06-13 PROCEDURE — G8427 DOCREV CUR MEDS BY ELIG CLIN: HCPCS | Performed by: SURGERY

## 2022-06-13 PROCEDURE — 99213 OFFICE O/P EST LOW 20 MIN: CPT | Performed by: SURGERY

## 2022-06-13 NOTE — PROGRESS NOTES
[facility]    PROCEDURE: 1) Full thickness skin graft to the nasal Mohs defect (2.5 x 2 cm)                            2) Rotation advancement flap for right upper lip reconstruction (4.2 x 3 cm)  DATE: 7/6/20    Augustin Tavarez has been recovering well since his procedure. Pain has been well controlled without pain medications. Since his last evaluation, he presents back for questions regarding his lips. He notes that the upper lip appears thin and he desires correction of this. Additionally, he underwent an additional excision of a lesion on the right lower lip with Dermatology and desired correction of this.     PMHx:   Past Medical History:   Diagnosis Date    Aortic regurgitation     Atrial fibrillation ([address]     BPH     Hyperlipidemia     Hypertension     Superficial phlebitis     Ulcerative colitis     Urinary retention    Afib (on anticoagulation)    PSHx:   Past Surgical History:   Procedure Laterality Date    COLONOSCOPY  10/2011    COLONOSCOPY  09/20/2013    COLONOSCOPY  10/16/2015    COLONOSCOPY N/A 10/11/2019    COLONOSCOPY CONTROL HEMORRHAGE performed by Gauri Bloom MD at [address] [place] [address]  10/2/2020    COLONOSCOPY WITH BIOPSY performed by Gauri Bloom MD at [address] [place] [address]  10/2/2020    COLONOSCOPY POLYPECTOMY SNARE/COLD BIOPSY performed by Gauri Bloom MD at [place] [address]      inguinal    MOHS SURGERY  07/25/2018    Left lower lip, left cheek, right cheek    MOHS SURGERY N/A 7/6/2020    FULL THICKNESS SKIN GRAFT TO THE NOSE (2.5 X 2 CM), ROTATIONAL ADVANCEMENT FLAP (3X4.2 CM) TO THE LIP (3X2 CM) performed by [facility] at [facility]      multiple times    PARATHYROIDECTOMY      SALIVARY GLAND SURGERY      parotid     Allergy:   Allergies   Allergen Reactions    Iodine      **IVP DYES**       SHx:   Social History     Socioeconomic History    Marital status:  Spouse name: Not on file    Number of children: 3    Years of education: Not on file    Highest education level: Not on file   Occupational History    Occupation: sold [employer]     Comment: retired   Tobacco Use    Smoking status: Current Some Day Smoker     Packs/day: 1.00     Years: 38.00     Pack years: 38.00     Types: Cigars, Cigarettes    Smokeless tobacco: Never Used   Vaping Use    Vaping Use: Never used   Substance and Sexual Activity    Alcohol use: Yes     Alcohol/week: 0.0 standard drinks     Comment: socially    Drug use: No    Sexual activity: Yes     Partners: Female   Other Topics Concern    Not on file   Social History Narrative    Not on file     Social Determinants of Health     Financial Resource Strain:     Difficulty of Paying Living Expenses: Not on file   Food Insecurity:     Worried About Running Out of Food in the Last Year: Not on file    Re of Food in the Last Year: Not on file   Transportation Needs:     Lack of Transportation (Medical): Not on file    Lack of Transportation (Non-Medical):  Not on file   Physical Activity:     Days of Exercise per Week: Not on file    Minutes of Exercise per Session: Not on file   Stress:     Feeling of Stress : Not on file   Social Connections:     Frequency of Communication with Friends and Family: Not on file    Frequency of Social Gatherings with Friends and Family: Not on file    Attends Taoism Services: Not on file    Active Member of Clubs or Organizations: Not on file    Attends Club or Organization Meetings: Not on file    Marital Status: Not on file   Intimate Partner Violence:     Fear of Current or Ex-Partner: Not on file    Emotionally Abused: Not on file    Physically Abused: Not on file    Sexually Abused: Not on file   Housing Stability:     Unable to Pay for Housing in the Last Year: Not on file    Number of [place] in the Last Year: Not on file    Unstable Housing in the Last Year: Not on file     FHx: Family history reviewed and is noncontributory  Meds:   Current Outpatient Medications   Medication Sig Dispense Refill    timolol (BLOCADREN) 10 MG tablet Take 10 mg by mouth daily . 5 daily      tamsulosin (FLOMAX) 0.4 MG capsule Take 1 capsule by mouth daily for 5 doses 5 capsule 0    carvedilol (COREG) 12.5 MG tablet Take 1 tablet by mouth 2 times daily (with meals) 90 tablet 1    lisinopril (PRINIVIL;ZESTRIL) 10 MG tablet Take 1 tablet by mouth daily 90 tablet 1    apixaban (ELIQUIS) 5 MG TABS tablet Take 1 tablet by mouth 2 times daily 180 tablet 1    dilTIAZem (CARDIZEM CD) 180 MG extended release capsule Take 1 capsule by mouth daily 90 capsule 1    Vitamin D (CHOLECALCIFEROL) 1000 UNITS CAPS capsule Take 1 capsule by mouth daily 30 capsule 0    sildenafil (VIAGRA) 100 MG tablet Take 1 tablet by mouth as needed for Erectile Dysfunction 16 tablet 2    simvastatin (ZOCOR) 20 MG tablet TAKE 1 TABLET IN THE EVENING 90 tablet 1    latanoprost (XALATAN) 0.005 % ophthalmic solution Place 1 drop into both eyes nightly.  aspirin EC 81 MG EC tablet Take 1 tablet by mouth daily. 30 tablet 12    mercaptopurine (PURINETHOL) 50 MG tablet Take 50 mg by mouth daily. No current facility-administered medications for this visit. ROS   Constitutional: Negative for chills and fever. HENT: Negative for congestion, facial swelling, and voice change. Eyes: Negative for photophobia and visual disturbance. Respiratory: Negative for apnea, cough, chest tightness and shortness of breath. Cardiovascular: Negative for chest pain and palpitations. Gastrointestinal: Negative for dysphagia and early satiety. Genitourinary: Negative for difficulty urinating, dysuria, flank pain, frequency and hematuria. Musculoskeletal: Negative for new gait problem, joint swelling and myalgias. Skin: Negative for color change, pallor and rash.    Endocrine: negative for tremors, temperature intolerance or polydipsia. Allergic/Immunologic: Negative for new environmental or food allergies. Neurological: Negative for dizziness, seizures, speech difficulty, numbness. Hematological: Negative for adenopathy. Psychiatric/Behavioral: Negative for agitation and confusion. EXAM    /71   Pulse 56   Temp 98.4 °F (36.9 °C)   Resp 16   Ht 5' 10\" (1.778 m)   Wt 232 lb (105.2 kg)   SpO2 96%   BMI 33.29 kg/m²     GEN: NAD, pleasant, appears stated age  CVS: RRR  PULM: No respiratory distress  HEENT: PERRLA/EOMI; hearing appears within normal limits  NECK: Supple with trachea in midline, no masses  FACE: Nasal graft with excellent take  Lip with whistle tip changes superiorly slightly improved from previous  Lateral lower lip with small area of bunching  ABD: soft/NT/ND  NEURO: No focal deficits, no obvious CN deficits    IMP: 80 y. o.male s/p facial reconstruction after Mohs extirpation  PLAN: Discussed options including lip lengthening with Z-plasty +/- filler. He is not interested in these options at this time and will return should he desire to move forward.     Estuardo Strauss MD  [address] [place] [address] [address] Reconstructive Surgery  [phone]  06/13/22

## 2022-07-18 ENCOUNTER — TELEPHONE (OUTPATIENT)
Dept: FAMILY MEDICINE CLINIC | Age: 87
End: 2022-07-18

## 2022-07-18 DIAGNOSIS — I48.21 PERMANENT ATRIAL FIBRILLATION (HCC): Primary | ICD-10-CM

## 2022-07-22 DIAGNOSIS — I48.21 PERMANENT ATRIAL FIBRILLATION (HCC): ICD-10-CM

## 2022-07-22 LAB
A/G RATIO: 2.6 (ref 1.1–2.2)
ALBUMIN SERPL-MCNC: 4.5 G/DL (ref 3.4–5)
ALP BLD-CCNC: 74 U/L (ref 40–129)
ALT SERPL-CCNC: 14 U/L (ref 10–40)
ANION GAP SERPL CALCULATED.3IONS-SCNC: 9 MMOL/L (ref 3–16)
AST SERPL-CCNC: 16 U/L (ref 15–37)
BILIRUB SERPL-MCNC: 1 MG/DL (ref 0–1)
BUN BLDV-MCNC: 23 MG/DL (ref 7–20)
CALCIUM SERPL-MCNC: 8.9 MG/DL (ref 8.3–10.6)
CHLORIDE BLD-SCNC: 103 MMOL/L (ref 99–110)
CO2: 28 MMOL/L (ref 21–32)
CREAT SERPL-MCNC: 1.2 MG/DL (ref 0.8–1.3)
GFR AFRICAN AMERICAN: >60
GFR NON-AFRICAN AMERICAN: 57
GLUCOSE FASTING: 103 MG/DL (ref 70–99)
POTASSIUM SERPL-SCNC: 4.4 MMOL/L (ref 3.5–5.1)
SODIUM BLD-SCNC: 140 MMOL/L (ref 136–145)
TOTAL PROTEIN: 6.2 G/DL (ref 6.4–8.2)

## 2022-07-27 ENCOUNTER — OFFICE VISIT (OUTPATIENT)
Dept: FAMILY MEDICINE CLINIC | Age: 87
End: 2022-07-27
Payer: MEDICARE

## 2022-07-27 VITALS
BODY MASS INDEX: 32.5 KG/M2 | TEMPERATURE: 97.1 F | RESPIRATION RATE: 98 BRPM | HEART RATE: 61 BPM | SYSTOLIC BLOOD PRESSURE: 126 MMHG | WEIGHT: 227 LBS | DIASTOLIC BLOOD PRESSURE: 84 MMHG | HEIGHT: 70 IN

## 2022-07-27 DIAGNOSIS — N18.31 STAGE 3A CHRONIC KIDNEY DISEASE (HCC): ICD-10-CM

## 2022-07-27 DIAGNOSIS — Z00.00 MEDICARE ANNUAL WELLNESS VISIT, SUBSEQUENT: ICD-10-CM

## 2022-07-27 DIAGNOSIS — M79.89 LEFT LEG SWELLING: ICD-10-CM

## 2022-07-27 DIAGNOSIS — N40.0 BENIGN PROSTATIC HYPERPLASIA WITHOUT LOWER URINARY TRACT SYMPTOMS: ICD-10-CM

## 2022-07-27 DIAGNOSIS — I48.21 PERMANENT ATRIAL FIBRILLATION (HCC): ICD-10-CM

## 2022-07-27 DIAGNOSIS — Z00.00 ENCOUNTER FOR ANNUAL WELLNESS VISIT (AWV) IN MEDICARE PATIENT: Primary | ICD-10-CM

## 2022-07-27 DIAGNOSIS — E78.2 MIXED HYPERLIPIDEMIA: ICD-10-CM

## 2022-07-27 LAB
CHOLESTEROL, TOTAL: 137 MG/DL (ref 0–199)
HDLC SERPL-MCNC: 48 MG/DL (ref 40–60)
LDL CHOLESTEROL CALCULATED: 75 MG/DL
PROSTATE SPECIFIC ANTIGEN: 11.56 NG/ML (ref 0–4)
TRIGL SERPL-MCNC: 71 MG/DL (ref 0–150)
VLDLC SERPL CALC-MCNC: 14 MG/DL

## 2022-07-27 PROCEDURE — G0439 PPPS, SUBSEQ VISIT: HCPCS | Performed by: NURSE PRACTITIONER

## 2022-07-27 PROCEDURE — 1123F ACP DISCUSS/DSCN MKR DOCD: CPT | Performed by: NURSE PRACTITIONER

## 2022-07-27 ASSESSMENT — PATIENT HEALTH QUESTIONNAIRE - PHQ9
SUM OF ALL RESPONSES TO PHQ QUESTIONS 1-9: 0
1. LITTLE INTEREST OR PLEASURE IN DOING THINGS: 0
1. LITTLE INTEREST OR PLEASURE IN DOING THINGS: 0
SUM OF ALL RESPONSES TO PHQ QUESTIONS 1-9: 0
2. FEELING DOWN, DEPRESSED OR HOPELESS: 0
SUM OF ALL RESPONSES TO PHQ9 QUESTIONS 1 & 2: 0
2. FEELING DOWN, DEPRESSED OR HOPELESS: 0
SUM OF ALL RESPONSES TO PHQ9 QUESTIONS 1 & 2: 0
SUM OF ALL RESPONSES TO PHQ QUESTIONS 1-9: 0

## 2022-07-27 ASSESSMENT — LIFESTYLE VARIABLES
HOW MANY STANDARD DRINKS CONTAINING ALCOHOL DO YOU HAVE ON A TYPICAL DAY: PATIENT DOES NOT DRINK
HOW OFTEN DO YOU HAVE A DRINK CONTAINING ALCOHOL: NEVER

## 2022-07-27 NOTE — ASSESSMENT & PLAN NOTE
Long discussion about his legs, encourage compression stocking use as this is helpful when he wears them.

## 2022-07-27 NOTE — PROGRESS NOTES
Medicare Annual Wellness Visit    Ella Garduno is here for Medicare AWV    Assessment & Plan   Encounter for annual wellness visit (AWV) in Medicare patient  Assessment & Plan:   awv complete today   Patient doing well overall   Lipids ordered  Other labs reviewed   Mixed hyperlipidemia  Assessment & Plan:   Stable, controlled  No changes  Continue current treatment plan     Orders:  -     Lipid, Fasting; Future  -     LIPID PANEL; Future  Benign prostatic hyperplasia without lower urinary tract symptoms  -     PSA, Prostatic Specific Antigen; Future  Medicare annual wellness visit, subsequent  Left leg swelling  Assessment & Plan:   Long discussion about his legs, encourage compression stocking use as this is helpful when he wears them. Stage 3a chronic kidney disease (HCC)  Assessment & Plan:     Stable, controlled  No changes  Continue current treatment plan     Permanent atrial fibrillation (HCC)  Assessment & Plan:   Stable, controlled  No changes  Continue current treatment plan       Recommendations for Preventive Services Due: see orders and patient instructions/AVS.  Recommended screening schedule for the next 5-10 years is provided to the patient in written form: see Patient Instructions/AVS.     Return for Medicare Annual Wellness Visit in 1 year. Subjective       Patient's complete Health Risk Assessment and screening values have been reviewed and are found in Flowsheets. The following problems were reviewed today and where indicated follow up appointments were made and/or referrals ordered.     Positive Risk Factor Screenings with Interventions:    Fall Risk:  Do you feel unsteady or are you worried about falling? : no  2 or more falls in past year?: (!) yes  Fall with injury in past year?: no   Fall Risk Interventions:    Home safety tips provided  Home exercises provided to promote strength and balance      Tobacco Use:  Tobacco Use: High Risk    Smoking Tobacco Use: Some Days    Smokeless Tobacco Use: Never     E-cigarette/Vaping       Questions Responses    E-cigarette/Vaping Use Never User    Start Date     Passive Exposure     Quit Date     Counseling Given     Comments           Substance Use - Tobacco Interventions:  Non smoker         General Health and ACP:  General  In general, how would you say your health is?: Good  In the past 7 days, have you experienced any of the following: New or Increased Pain, New or Increased Fatigue, Loneliness, Social Isolation, Stress or Anger?: No  Do you get the social and emotional support that you need?: Yes  Do you have a Living Will?: Yes    Advance Directives       Power of  Living Will ACP-Advance Directive ACP-Power of     Not on File Not on File Not on File Not on File        General Health Risk Interventions:  No concerns, active, plays golf regularly    Health Habits/Nutrition:  Physical Activity: Insufficiently Active    Days of Exercise per Week: 3 days    Minutes of Exercise per Session: 20 min     Have you lost any weight without trying in the past 3 months?: No  Body mass index: (!) 32.57  Have you seen the dentist within the past year?: Yes  Health Habits/Nutrition Interventions:  Stays active    Hearing/Vision:  Do you or your family notice any trouble with your hearing that hasn't been managed with hearing aids?: (!) Yes  Do you have difficulty driving, watching TV, or doing any of your daily activities because of your eyesight?: (!) Yes  Have you had an eye exam within the past year?: Yes  No results found. Hearing/Vision Interventions:  Hearing concerns:  patient declines any further evaluation/treatment for hearing issues, already has hearing aids            Objective   Vitals:    07/27/22 1109   BP: 126/84   Site: Left Upper Arm   Position: Sitting   Cuff Size: Medium Adult   Pulse: 61   Resp: (!) 98   Temp: 97.1 °F (36.2 °C)   Weight: 227 lb (103 kg)   Height: 5' 10\" (1.778 m)      Body mass index is 32.57 kg/m². General Appearance: alert and oriented to person, place and time, well developed and well- nourished, in no acute distress  Skin: warm and dry, no rash or erythema  Head: normocephalic and atraumatic  Eyes: pupils equal, round, and reactive to light, extraocular eye movements intact, conjunctivae normal  ENT: tympanic membrane, external ear and ear canal normal bilaterally, nose without deformity, nasal mucosa and turbinates normal without polyps  Neck: supple and non-tender without mass, no thyromegaly or thyroid nodules, no cervical lymphadenopathy  Pulmonary/Chest: clear to auscultation bilaterally- no wheezes, rales or rhonchi, normal air movement, no respiratory distress  Cardiovascular: normal rate, regular rhythm, normal S1 and S2, no murmurs, rubs, clicks, or gallops, distal pulses intact, no carotid bruits  Abdomen: soft, non-tender, non-distended, normal bowel sounds, no masses or organomegaly  Extremities: no cyanosis, clubbing or edema  Musculoskeletal: normal range of motion, no joint swelling, deformity or tenderness  Neurologic: reflexes normal and symmetric, no cranial nerve deficit, gait, coordination and speech normal       Allergies   Allergen Reactions    Iodine      **IVP DYES**     Prior to Visit Medications    Medication Sig Taking? Authorizing Provider   timolol (BETIMOL) 0.5 % ophthalmic solution 1 drop 2 times daily Yes Historical Provider, MD   tamsulosin (FLOMAX) 0.4 MG capsule Take 1 capsule by mouth daily for 5 doses Yes Bella Juarez MD   carvedilol (COREG) 12.5 MG tablet Take 1 tablet by mouth 2 times daily (with meals) Yes Valerie Rocha MD   lisinopril (PRINIVIL;ZESTRIL) 10 MG tablet Take 1 tablet by mouth daily  Patient taking differently: Take 20 mg by mouth in the morning.  Yes Valerie Rocha MD   apixaban (ELIQUIS) 5 MG TABS tablet Take 1 tablet by mouth 2 times daily Yes Valerie Rocha MD   dilTIAZem (CARDIZEM CD) 180 MG extended release capsule Take 1 capsule by mouth daily Yes Jorge Rowe MD   Vitamin D (CHOLECALCIFEROL) 1000 UNITS CAPS capsule Take 1 capsule by mouth daily Yes Jorge Rowe MD   sildenafil (VIAGRA) 100 MG tablet Take 1 tablet by mouth as needed for Erectile Dysfunction Yes Jorge Rowe MD   simvastatin (ZOCOR) 20 MG tablet TAKE 1 TABLET IN THE EVENING Yes Jorge Rowe MD   latanoprost (XALATAN) 0.005 % ophthalmic solution Place 1 drop into both eyes nightly. Yes Historical Provider, MD   aspirin EC 81 MG EC tablet Take 1 tablet by mouth daily. Yes Jorge Rowe MD   mercaptopurine (PURINETHOL) 50 MG tablet Take 50 mg by mouth daily.  Yes Historical Provider, MD Hardy (Including outside providers/suppliers regularly involved in providing care):   Patient Care Team:  LIZZ Thompson CNP as PCP - General (Nurse Practitioner)  LIZZ Thompson CNP as PCP - [facility] Empaneled Provider     Reviewed and updated this visit:  Allergies  Meds

## 2022-07-27 NOTE — PATIENT INSTRUCTIONS
Personalized Preventive Plan for Jordy Baird - 7/27/2022  Medicare offers a range of preventive health benefits. Some of the tests and screenings are paid in full while other may be subject to a deductible, co-insurance, and/or copay. Some of these benefits include a comprehensive review of your medical history including lifestyle, illnesses that may run in your family, and various assessments and screenings as appropriate. After reviewing your medical record and screening and assessments performed today your provider may have ordered immunizations, labs, imaging, and/or referrals for you. A list of these orders (if applicable) as well as your Preventive Care list are included within your After Visit Summary for your review. Other Preventive Recommendations:    A preventive eye exam performed by an eye specialist is recommended every 1-2 years to screen for glaucoma; cataracts, macular degeneration, and other eye disorders. A preventive dental visit is recommended every 6 months. Try to get at least 150 minutes of exercise per week or 10,000 steps per day on a pedometer . Order or download the FREE \"Exercise & Physical Activity: Your Everyday Guide\" from [employer]. Call 0-570.747.9303 or search [employer] online. You need 8358-6779 mg of calcium and 8677-8072 IU of vitamin D per day. It is possible to meet your calcium requirement with diet alone, but a vitamin D supplement is usually necessary to meet this goal.  When exposed to the sun, use a sunscreen that protects against both UVA and UVB radiation with an SPF of 30 or greater. Reapply every 2 to 3 hours or after sweating, drying off with a towel, or swimming. Always wear a seat belt when traveling in a car. Always wear a helmet when riding a bicycle or motorcycle.

## 2022-11-13 LAB — PATHOLOGY/CYTOLOGY REPORT: NORMAL

## 2022-11-14 ENCOUNTER — NURSE ONLY (OUTPATIENT)
Dept: FAMILY MEDICINE CLINIC | Age: 87
End: 2022-11-14
Payer: MEDICARE

## 2022-11-14 DIAGNOSIS — Z23 NEED FOR INFLUENZA VACCINATION: Primary | ICD-10-CM

## 2022-11-14 PROCEDURE — G0008 ADMIN INFLUENZA VIRUS VAC: HCPCS | Performed by: NURSE PRACTITIONER

## 2022-11-14 PROCEDURE — 90694 VACC AIIV4 NO PRSRV 0.5ML IM: CPT | Performed by: NURSE PRACTITIONER

## 2023-04-05 NOTE — OP NOTE
[address] [place] [address]  Endoscopy Note    Patient: Chino Martinez  : 1934  Acct#:     Procedure: Colonoscopy with intubation of the terminal ileum  Colonoscopy with biopsy  Colonoscopy with snare  Colonoscopy with clip placement    Date:  10/2/2020    Surgeon:  Lg London MD    Referring Physician:  Rodney LOPEZ    Anesthesia:  TIVA    Indications: This is a 80y.o. year old male who presents today with  3cm adenoma on proximal side of fold in hepatic flexure removed piecemeal in 2019. Here to check for residual.  Also with ulcerative colitis for surveillance biopsies. Previous Colonoscopy: YES  Date: 10/2019  Greater than 3 years: NO (large polyp removed in pieces so sooner surveillance needed). Procedure: An informed consent was obtained from the patient after explanation of indications, benefits, possible risks and complications of the procedure. The patient was then taken to the endoscopy suite, placed in the left lateral decubitus position, and the above IV anesthesia was administered. A digital rectal examination was performed and revealed negative without mass, lesions or tenderness. The Olympus pediatric video colonoscope was placed in the patient's rectum under digital direction and advanced to the cecum. The cecum was identified by characteristic anatomy and ballottment. The prep was poor. With aggressive lavage, fair views were obtained, though polyps less than 5 mm may have been missed. The ileocecal valve was identified and intubated. The ascending colon was examined twice to assure no sessile polyps missed. The scope was then withdrawn back through the cecum, ascending, transverse, descending and sigmoid colons. Carefull circumferential examination of the mucosa in these areas was performed. The scope was then withdrawn into the rectum and retroflexed.   The scope was straightened, the colon was decompressed and the scope was withdrawn from the patient. Findings:  1. Normal Ileum  2. No active ulcerative colitis. 4-quadrant biopsies every 10cm were taken. 3.  There were multiple pseudopolyps throughout the colon. There was 1 pedunculated polyp in the descending colon that appeared more adenomatous and was removed with hot snare polypectomy. Given prior post-polypectomy bleed and need for eliquis, a single endoclip was placed to close the polypectomy site. 4.  Mild sigmoid diverticulosis. 5.  Grade 1 internal hemorrhoids    The patient tolerated the procedure well and was taken to Recovery in good condition. No complications. EBL: minimal  Specimens taken: yes      Impression:   1. Normal Ileum  2. No active ulcerative colitis. There were multiple benign appearing pseudopolyps which are benign results of prior inflammation. 4-quadrant biopsies every 10cm were taken. 3.  There was 1 pedunculated polyp in the descending colon that appeared more adenomatous and was 8mm in size and removed with hot snare polypectomy. Given prior post-polypectomy bleed and need for eliquis, a single endoclip was placed to close the polypectomy site. 4.  Mild sigmoid diverticulosis. 5.  Grade 1 internal hemorrhoids    Recommendations:   1. Clear liquid diet, advance as tolerated. 2.  Will consider repeat colonoscopy in 2 years  3. Follow up with me in 1 year. Call sooner if problems. 4.  Resume eliquis Sunday morning. 5.  The patient had a metallic clipping device applied today during their procedure to decrease the risk of post polypectomy bleeding. These clips will spontaneously pass out with the stool in the next several weeks. The patient should call in the next 30 days if MRI is needed as a special protocol will be needed.       Dominick Smith MD   [address]  10/2/2020 Attending Attestation (For Attendings USE Only)...

## 2023-05-04 ENCOUNTER — OFFICE VISIT (OUTPATIENT)
Dept: FAMILY MEDICINE CLINIC | Age: 88
End: 2023-05-04

## 2023-05-04 VITALS
SYSTOLIC BLOOD PRESSURE: 138 MMHG | BODY MASS INDEX: 33.66 KG/M2 | OXYGEN SATURATION: 99 % | DIASTOLIC BLOOD PRESSURE: 84 MMHG | TEMPERATURE: 97.7 F | HEART RATE: 64 BPM | WEIGHT: 234.6 LBS | RESPIRATION RATE: 12 BRPM

## 2023-05-04 DIAGNOSIS — H61.23 BILATERAL IMPACTED CERUMEN: ICD-10-CM

## 2023-05-04 DIAGNOSIS — M79.89 RIGHT LEG SWELLING: Primary | ICD-10-CM

## 2023-05-04 DIAGNOSIS — K51.00 ULCERATIVE PANCOLITIS (HCC): ICD-10-CM

## 2023-05-04 DIAGNOSIS — I48.21 PERMANENT ATRIAL FIBRILLATION (HCC): ICD-10-CM

## 2023-05-04 DIAGNOSIS — N18.31 STAGE 3A CHRONIC KIDNEY DISEASE (HCC): ICD-10-CM

## 2023-05-04 DIAGNOSIS — K51.00 ULCERATIVE PANCOLITIS WITHOUT COMPLICATION (HCC): ICD-10-CM

## 2023-05-04 SDOH — ECONOMIC STABILITY: FOOD INSECURITY: WITHIN THE PAST 12 MONTHS, THE FOOD YOU BOUGHT JUST DIDN'T LAST AND YOU DIDN'T HAVE MONEY TO GET MORE.: NEVER TRUE

## 2023-05-04 SDOH — ECONOMIC STABILITY: INCOME INSECURITY: HOW HARD IS IT FOR YOU TO PAY FOR THE VERY BASICS LIKE FOOD, HOUSING, MEDICAL CARE, AND HEATING?: NOT HARD AT ALL

## 2023-05-04 SDOH — ECONOMIC STABILITY: FOOD INSECURITY: WITHIN THE PAST 12 MONTHS, YOU WORRIED THAT YOUR FOOD WOULD RUN OUT BEFORE YOU GOT MONEY TO BUY MORE.: NEVER TRUE

## 2023-05-04 SDOH — ECONOMIC STABILITY: HOUSING INSECURITY
IN THE LAST 12 MONTHS, WAS THERE A TIME WHEN YOU DID NOT HAVE A STEADY PLACE TO SLEEP OR SLEPT IN A SHELTER (INCLUDING NOW)?: NO

## 2023-05-04 ASSESSMENT — PATIENT HEALTH QUESTIONNAIRE - PHQ9
1. LITTLE INTEREST OR PLEASURE IN DOING THINGS: 0
SUM OF ALL RESPONSES TO PHQ QUESTIONS 1-9: 0
SUM OF ALL RESPONSES TO PHQ QUESTIONS 1-9: 0
SUM OF ALL RESPONSES TO PHQ9 QUESTIONS 1 & 2: 0
2. FEELING DOWN, DEPRESSED OR HOPELESS: 0
SUM OF ALL RESPONSES TO PHQ QUESTIONS 1-9: 0
SUM OF ALL RESPONSES TO PHQ QUESTIONS 1-9: 0

## 2023-05-04 NOTE — ASSESSMENT & PLAN NOTE
Low suspicion for dvt, but will check d dimer   Continue with compression stockings  He is going to see a vascular MD for further recommendations about leg swelling.

## 2023-05-04 NOTE — PROGRESS NOTES
Henry Cabrera (:  1934) is a 80 y.o. male,Established patient, here for evaluation of the following chief complaint(s):  Leg Swelling (Right leg is more swollen than the left. Right leg is painful) and Ear Fullness      ASSESSMENT/PLAN:  1. Right leg swelling  Assessment & Plan:   Low suspicion for dvt, but will check d dimer   Continue with compression stockings  He is going to see a vascular MD for further recommendations about leg swelling. Orders:  -     D-Dimer, Quantitative; Future  2. Ulcerative pancolitis ([facility] [address]  Assessment & Plan:   Stable, controlled  No changes  Continue current treatment plan     3. Permanent atrial fibrillation (HCC)  Assessment & Plan:   Stable, controlled  No changes  Continue current treatment plan     4. Stage 3a chronic kidney disease ([facility] [address]  Assessment & Plan:   Stable, controlled  No changes  Continue current treatment plan     5. Ulcerative pancolitis without complication ([facility] [address]  Assessment & Plan:   Stable, controlled  No changes  Continue current treatment plan     6. Bilateral impacted cerumen  Assessment & Plan:   bilateral canal cleared of large amt cerumen after H2O2 dwell and manual extraction with instrumentation. TM intact with mild erythema and improved hearing. Canal clear with mild erythema  Patient tolerated procedure well        No follow-ups on file. SUBJECTIVE/OBJECTIVE:  Patient in the office today with concerns about right lower extremity swelling more than the right. He has chronic leg edema and does wear compression stockings. Recently traveled back from [place] about a month ago and pain in his right lower calf started a few days ago. He states that he has been completely compliant with his Eliquis and has not had recurrence of any blood clots since starting this medication for A-fib many years ago. He also states that he has not been hearing well and believes that he has cerumen impaction bilaterally.     Current Outpatient Medications

## 2023-05-10 ENCOUNTER — TELEPHONE (OUTPATIENT)
Dept: FAMILY MEDICINE CLINIC | Age: 88
End: 2023-05-10

## 2023-05-10 ENCOUNTER — OFFICE VISIT (OUTPATIENT)
Dept: FAMILY MEDICINE CLINIC | Age: 88
End: 2023-05-10
Payer: MEDICARE

## 2023-05-10 DIAGNOSIS — M79.89 RIGHT LEG SWELLING: ICD-10-CM

## 2023-05-10 PROCEDURE — G8417 CALC BMI ABV UP PARAM F/U: HCPCS | Performed by: NURSE PRACTITIONER

## 2023-05-10 PROCEDURE — 99213 OFFICE O/P EST LOW 20 MIN: CPT | Performed by: NURSE PRACTITIONER

## 2023-05-10 PROCEDURE — 4004F PT TOBACCO SCREEN RCVD TLK: CPT | Performed by: NURSE PRACTITIONER

## 2023-05-10 PROCEDURE — G8427 DOCREV CUR MEDS BY ELIG CLIN: HCPCS | Performed by: NURSE PRACTITIONER

## 2023-05-10 PROCEDURE — 1123F ACP DISCUSS/DSCN MKR DOCD: CPT | Performed by: NURSE PRACTITIONER

## 2023-05-10 ASSESSMENT — ENCOUNTER SYMPTOMS
SHORTNESS OF BREATH: 0
BACK PAIN: 0
VOMITING: 0
SORE THROAT: 0
EYE REDNESS: 0
ABDOMINAL PAIN: 0
COLOR CHANGE: 0
WHEEZING: 0
EYE ITCHING: 0
NAUSEA: 0
SINUS PRESSURE: 0
DIARRHEA: 0
BLOOD IN STOOL: 0
CHEST TIGHTNESS: 0
CONSTIPATION: 0
RHINORRHEA: 0
COUGH: 0

## 2023-05-10 NOTE — PROGRESS NOTES
Content: Thought content normal.         Judgment: Judgment normal.               An electronic signature was used to authenticate this note.     --LIZZ Sims - CNP

## 2023-05-23 ENCOUNTER — TELEPHONE (OUTPATIENT)
Dept: SURGERY | Age: 88
End: 2023-05-23

## 2023-06-21 ENCOUNTER — PROCEDURE VISIT (OUTPATIENT)
Dept: SURGERY | Age: 88
End: 2023-06-21

## 2023-06-21 VITALS — HEART RATE: 76 BPM | DIASTOLIC BLOOD PRESSURE: 68 MMHG | SYSTOLIC BLOOD PRESSURE: 134 MMHG

## 2023-06-21 DIAGNOSIS — C44.01 BASAL CELL CARCINOMA OF SKIN OF LEFT UPPER LIP: Primary | ICD-10-CM

## 2023-06-21 NOTE — PATIENT INSTRUCTIONS
pressure for another 20 minutes. If the bleeding does not stop after you apply pressure, call us right away. If you can not call, go to the nearest emergency room or urgent care facility. What to expect:  You may have these symptoms. They are normal and should get better with time:  Swelling. Swelling usually increases for the first 48 hours after your procedure and then begins to improve. Some soreness and redness around your wound. If we worked close to your eyes (forehead, nose, temple, or upper cheeks) your eyes may become swollen and/ or black and blue. Bruising, which could last 1 week or more. Pink and bumpy appearance to the scar. This may happen a few weeks after your procedure. After 4 weeks, you may gently massage the area each day with facial moisturizer or petroleum jelly (Vaseline or Aquaphor). This will help to smooth the skin and improve the appearance of the scar. The color of your scar will fade over time, but it may be pink for several months after the procedure. The scar may take 6 months to 1 year to reach its final color and appearance. \"Spitting\" suture. Occasionally, an inside suture (stitch) does not completely dissolve. When this happens, (generally 4-8 weeks after surgery), it causes a bump or \"pimple\" to form on the scar. This is easily removed and is not at all serious. It does not mean the skin cancer has returned. Contact us if it happens, but do not be alarmed. Vitamin E oil is NOT necessary. A good moisturizer is just as effective. Sunscreen IS necessary. Use at least and SPF 30 sunscreen daily- even in winter    Call us at [phone] right away if you have any of the following symptoms:  -Bleeding that you can not stop (see highlighted area above). -Pain that lasts longer than 48 hours.  -Your wound becomes more painful, red or hot.  -Swelling that does not begin to improve within the 48 hours or gets worse suddenly.

## 2023-06-21 NOTE — PROGRESS NOTES
MOHS PROCEDURE NOTE    PHYSICIAN:  Jacqui Balbuena. Marleen Blackburn MD, Who operated in two distinct and integrated capacities as the surgeon removing the tissue and as the pathologist examining the tissue. ASSISTANT: Rene Colon RN and Rei Tello RN      REFERRING PROVIDER:   Sen Grace MD, Ph.D.     PREOPERATIVE DIAGNOSIS: Nodular Basal Cell Carcinoma, ulcerated     SPECIFIC MOHS INDICATIONS:  location and need for tissue conservation    AUC SCORIN/9    POSTOPERATIVE DIAGNOSIS: SAME    LOCATION: Left upper cutaneous lip    OPERATIVE PROCEDURE:  MOHS MICROGRAPHIC SURGERY    RECONSTRUCTION OF DEFECT: V to Y Advancement Flap (non-tunneled island pedicle flap)    PREOPERATIVE SIZE: 6x4 MM    DEFECT SIZE: 19x15 MM    LENGTH OF REPAIRED WOUND/SIZE OF FLAP/SIZE OF GRAFT:  9.88cm2    ANESTHESIA: 16 mL 1% lidocaine with epinephrine 1:100,000 buffered. EBL:  MINIMAL    DURATION OF PROCEDURE:  4 HOURS    POSTOPERATIVE OBSERVATION: 0.5 HOUR    SPECIMENS:  SEE MOHS MAP    COMPLICATIONS:  NONE    DESCRIPTION OF PROCEDURE:  The patient was given a mirror, as appropriate, and the biopsy site was identified, marked with a surgical marking pen, and verified by the patient. Options for treatment were discussed and the patient was informed that Mohs surgery was the selected treatment based on its lower recurrence rate, given the features listed above, as compared to other treatment modalities such as excision, radiation, or curettage, and agreed with this treatment plan. Risks and benefits including bruising, swelling, bleeding, infection, nerve injury, recurrence, and scarring were discussed with the patient prior to the procedure and a written consent detailing these and other risks was reviewed with the patient and signed. There was a time out for person and procedure verification. The surgical site was prepped with an antiseptic solution.   Application of an antiseptic solution was repeated before each surgical

## 2023-06-21 NOTE — PROGRESS NOTES
PRE-PROCEDURE SCREENING    Pacemaker/ICD: No  Difficulty with numbing in the past: No  Local Anesthesia Reaction/passing out: No  Latex or adhesive allergy:  No  Bleeding/Clotting Disorders: No  Anticoagulant Therapy: Yes, eliquis and asa 81 - hx of clots  Joint prosthesis: No  Artificial Heart Valve: No  Stroke or Seizures: No  Organ Transplant or Lymphoma: No  Immunosuppression: No  Respiratory Problems: No

## 2023-06-22 ENCOUNTER — TELEPHONE (OUTPATIENT)
Dept: SURGERY | Age: 88
End: 2023-06-22

## 2023-06-22 NOTE — TELEPHONE ENCOUNTER
The patient was in the office on 6/21/23 for Mohs located on the left upper cutaneous lip with v to y advancement flap repair. The patient tolerated the procedure well and left the office in good condition. Pain level on post-operative day 1:  present - adequately treated and level of pain (1-10, 10 severe). Any bleeding episode that required pressure to be held, bandage change or a call to the office or MD?  no     Any other issues?:  no    A post-operative telephone call was placed at [phone] in order to check on the patient's recovery process. The patient reported doing well and had no complaints other than those listed above, if any. All of the patient's questions were answered.

## 2023-06-27 ENCOUNTER — OFFICE VISIT (OUTPATIENT)
Dept: SURGERY | Age: 88
End: 2023-06-27

## 2023-06-27 ENCOUNTER — TELEPHONE (OUTPATIENT)
Dept: SURGERY | Age: 88
End: 2023-06-27

## 2023-06-27 DIAGNOSIS — D48.5 NEOPLASM OF UNCERTAIN BEHAVIOR OF SKIN: Primary | ICD-10-CM

## 2023-06-27 PROCEDURE — 99024 POSTOP FOLLOW-UP VISIT: CPT | Performed by: DERMATOLOGY

## 2023-07-27 ENCOUNTER — TELEPHONE (OUTPATIENT)
Dept: FAMILY MEDICINE CLINIC | Age: 88
End: 2023-07-27

## 2023-07-27 DIAGNOSIS — R97.20 ELEVATED PROSTATE SPECIFIC ANTIGEN (PSA): ICD-10-CM

## 2023-07-27 DIAGNOSIS — E78.2 MIXED HYPERLIPIDEMIA: ICD-10-CM

## 2023-07-27 DIAGNOSIS — Z00.00 ENCOUNTER FOR ANNUAL WELLNESS VISIT (AWV) IN MEDICARE PATIENT: Primary | ICD-10-CM

## 2023-07-27 NOTE — TELEPHONE ENCOUNTER
Patient is requesting orders to be done before his appointment on 8/9/23. He wants the orders sent  to [facility] in [place]. Fax number [phone].

## 2023-08-03 LAB
A/G RATIO: 1.7 (ref 1.2–2.2)
ALBUMIN SERPL-MCNC: 4.1 G/DL (ref 3.7–4.7)
ALP BLD-CCNC: 91 IU/L (ref 44–121)
ALT SERPL-CCNC: 20 IU/L (ref 0–44)
AMBIGUOUS ABBREVIATION: NORMAL
AST SERPL-CCNC: 20 IU/L (ref 0–40)
BASOPHILS ABSOLUTE: 0 X10E3/UL (ref 0–0.2)
BASOPHILS RELATIVE PERCENT: 1 %
BILIRUB SERPL-MCNC: 1.5 MG/DL (ref 0–1.2)
BUN / CREAT RATIO: 15 (ref 10–24)
BUN BLDV-MCNC: 20 MG/DL (ref 8–27)
CALCIUM SERPL-MCNC: 9 MG/DL (ref 8.6–10.2)
CHLORIDE BLD-SCNC: 103 MMOL/L (ref 96–106)
CHOLESTEROL, TOTAL: 115 MG/DL (ref 100–199)
CO2: 26 MMOL/L (ref 20–29)
COMMENT: NORMAL
CREAT SERPL-MCNC: 1.32 MG/DL (ref 0.76–1.27)
EOSINOPHILS ABSOLUTE: 0.2 X10E3/UL (ref 0–0.4)
EOSINOPHILS RELATIVE PERCENT: 4 %
ERYTHROCYTES, NUCLEATED/100 LEU: ABNORMAL
ESTIMATED GLOMERULAR FILTRATION RATE CREATININE EQUATION: 52 ML/MIN/1.73
GLOBULIN: 2.4 G/DL (ref 1.5–4.5)
GLUCOSE BLD-MCNC: 99 MG/DL (ref 70–99)
HCT VFR BLD CALC: 42.6 % (ref 37.5–51)
HDLC SERPL-MCNC: 46 MG/DL
HEMOGLOBIN: 13.5 G/DL (ref 13–17.7)
IMMATURE CELLS ABSOLUTE COUNT: ABNORMAL
IMMATURE GRANS (ABS): 0 X10E3/UL (ref 0–0.1)
IMMATURE GRANULOCYTES: 0 %
LDL CHOLESTEROL CALCULATED: 56 MG/DL (ref 0–99)
LYMPHOCYTES ABSOLUTE: 0.6 X10E3/UL (ref 0.7–3.1)
LYMPHOCYTES RELATIVE PERCENT: 14 %
MCH RBC QN AUTO: 28.8 PG (ref 26.6–33)
MCHC RBC AUTO-ENTMCNC: 31.7 G/DL (ref 31.5–35.7)
MCV RBC AUTO: 91 FL (ref 79–97)
MONOCYTES ABSOLUTE: 0.5 X10E3/UL (ref 0.1–0.9)
MONOCYTES RELATIVE PERCENT: 11 %
MORPHOLOGY: ABNORMAL
NEUTROPHILS ABSOLUTE: 3.3 X10E3/UL (ref 1.4–7)
PDW BLD-RTO: 14.7 % (ref 11.6–15.4)
PLATELET # BLD: 200 X10E3/UL (ref 150–450)
POTASSIUM SERPL-SCNC: 4.9 MMOL/L (ref 3.5–5.2)
PROSTATE SPECIFIC ANTIGEN: 12.6 NG/ML (ref 0–4)
RBC # BLD: 4.68 X10E6/UL (ref 4.14–5.8)
SEGMENTED NEUTROPHILS RELATIVE PERCENT: 70 %
SODIUM BLD-SCNC: 143 MMOL/L (ref 134–144)
TOTAL PROTEIN: 6.5 G/DL (ref 6–8.5)
TRIGL SERPL-MCNC: 56 MG/DL (ref 0–149)
VLDLC SERPL CALC-MCNC: 13 MG/DL (ref 5–40)
WBC # BLD: 4.7 X10E3/UL (ref 3.4–10.8)

## 2023-08-09 ENCOUNTER — OFFICE VISIT (OUTPATIENT)
Dept: FAMILY MEDICINE CLINIC | Age: 88
End: 2023-08-09

## 2023-08-09 VITALS
SYSTOLIC BLOOD PRESSURE: 128 MMHG | HEIGHT: 70 IN | RESPIRATION RATE: 16 BRPM | HEART RATE: 60 BPM | TEMPERATURE: 97.9 F | WEIGHT: 234 LBS | BODY MASS INDEX: 33.5 KG/M2 | OXYGEN SATURATION: 100 % | DIASTOLIC BLOOD PRESSURE: 70 MMHG

## 2023-08-09 DIAGNOSIS — C44.310 BASAL CELL CARCINOMA, FACE: ICD-10-CM

## 2023-08-09 DIAGNOSIS — Z00.00 MEDICARE ANNUAL WELLNESS VISIT, SUBSEQUENT: Primary | ICD-10-CM

## 2023-08-09 DIAGNOSIS — B35.1 ONYCHOMYCOSIS: ICD-10-CM

## 2023-08-09 DIAGNOSIS — N40.0 BENIGN NON-NODULAR PROSTATIC HYPERPLASIA WITHOUT LOWER URINARY TRACT SYMPTOMS: ICD-10-CM

## 2023-08-09 ASSESSMENT — PATIENT HEALTH QUESTIONNAIRE - PHQ9
SUM OF ALL RESPONSES TO PHQ QUESTIONS 1-9: 0
1. LITTLE INTEREST OR PLEASURE IN DOING THINGS: 0
2. FEELING DOWN, DEPRESSED OR HOPELESS: 0
SUM OF ALL RESPONSES TO PHQ9 QUESTIONS 1 & 2: 0

## 2023-08-09 ASSESSMENT — LIFESTYLE VARIABLES
HOW OFTEN DO YOU HAVE A DRINK CONTAINING ALCOHOL: NEVER
HOW MANY STANDARD DRINKS CONTAINING ALCOHOL DO YOU HAVE ON A TYPICAL DAY: PATIENT DOES NOT DRINK

## 2023-08-09 NOTE — PROGRESS NOTES
Medicare Annual Wellness Visit    Alon Winkler is here for Medicare AWV    Assessment & Plan   Medicare annual wellness visit, subsequent  Assessment & Plan:   awv in office   Reviewed labs  Reviewed HM   Basal cell carcinoma, face  Assessment & Plan:   Following with [facility] derm and hem/onc  Benign non-nodular prostatic hyperplasia without lower urinary tract symptoms  Assessment & Plan:   Protein in urine today on dipstick   Will follow up with urology     Onychomycosis  Assessment & Plan:   Reassurance provided  He would like to also discuss with derm when he follows up. Recommendations for Preventive Services Due: see orders and patient instructions/AVS.  Recommended screening schedule for the next 5-10 years is provided to the patient in written form: see Patient Instructions/AVS.     No follow-ups on file. Subjective   The following acute and/or chronic problems were also addressed today:  Luis Biswas has multiple problems that he wanted to address today:  1-he is following with several different dermatologists. He most recently was seen at [facility] for [address] [place] [address] and was referred to hem/onc. He went to see them and they suggested additional scans to see if there was any further concern, and recommended chemotherapy treatment. He decided at that time that he was not interested, and he still is reviewing his options. 2- he has dark green/brown discoloration to his bilateral thumb nails that have been present for several weeks to months without change. Denies any trauma to the nails. Has no prior history of melanoma, but does have history of BCC and SCC. He denies any pain. 3- he is worried about bubbles in his urine. Denies any frequency or urgency, just that he has noticed that he has more bubbles. He follows with urology yearly. Denies any hematuria. 4- he is fatigued, not sure if it is a medication side effect, or what could be causing this.  He is taking all his medications as prescribed-including meds form GI

## 2023-10-02 ENCOUNTER — OFFICE VISIT (OUTPATIENT)
Dept: SURGERY | Age: 88
End: 2023-10-02
Payer: MEDICARE

## 2023-10-02 DIAGNOSIS — L60.3 NAIL DYSTROPHY: Primary | ICD-10-CM

## 2023-10-02 DIAGNOSIS — C44.319 BASAL CELL CARCINOMA OF RIGHT CHEEK: ICD-10-CM

## 2023-10-02 DIAGNOSIS — L82.1 SEBORRHEIC KERATOSIS: ICD-10-CM

## 2023-10-02 DIAGNOSIS — D48.5 NEOPLASM OF UNCERTAIN BEHAVIOR OF SKIN: ICD-10-CM

## 2023-10-02 PROCEDURE — 4004F PT TOBACCO SCREEN RCVD TLK: CPT | Performed by: DERMATOLOGY

## 2023-10-02 PROCEDURE — G8484 FLU IMMUNIZE NO ADMIN: HCPCS | Performed by: DERMATOLOGY

## 2023-10-02 PROCEDURE — 1123F ACP DISCUSS/DSCN MKR DOCD: CPT | Performed by: DERMATOLOGY

## 2023-10-02 PROCEDURE — G8417 CALC BMI ABV UP PARAM F/U: HCPCS | Performed by: DERMATOLOGY

## 2023-10-02 PROCEDURE — G8427 DOCREV CUR MEDS BY ELIG CLIN: HCPCS | Performed by: DERMATOLOGY

## 2023-10-02 PROCEDURE — 99214 OFFICE O/P EST MOD 30 MIN: CPT | Performed by: DERMATOLOGY

## 2023-10-02 RX ORDER — IMIQUIMOD 12.5 MG/.25G
CREAM TOPICAL
Qty: 12 EACH | Refills: 1 | Status: SHIPPED | OUTPATIENT
Start: 2023-10-02 | End: 2023-10-09

## 2023-10-02 RX ORDER — AMIODARONE HYDROCHLORIDE 200 MG/1
200 TABLET ORAL DAILY
COMMUNITY
Start: 2023-09-29

## 2023-10-02 NOTE — PATIENT INSTRUCTIONS
[facility] Mohs Surgery Office Hours:    Monday-Thursday  7:30 AM-4:30 PM    Friday  9:00 AM-1:00 PM     Biopsy Wound Care Instructions  Cleanse the wound with mild soapy water daily. Gently dry the area. Apply Vaseline or petroleum jelly to the wound using a cotton tipped applicator. Cover with a clean bandage. Repeat this process until the biopsy site is healed. If you had stitches placed, continue treating the site until the stitches are removed. Remember to make an appointment to return to have your stitches removed by our staff. You may shower and bathe as usual.   ** Biopsy results generally take around 7 business days to come back. If you have not heard from us by then, please call the office at [phone] between [facility] Monday through Friday. On fingernails soak with 50/50 vinegar to water.  Daily for 15 min

## 2023-10-02 NOTE — PROGRESS NOTES
[facility]) Mohs Surgery and Cosmetic Dermatology  Dilcia Bell MD  [phone]      Eliz Gill  7/4/1934    80 y.o. male     Date of Visit: 10/2/2023        CC:   here for first full skin exam with me, longtime surgery pateint    History of Present Illness:  1. The pt presents today well known to me as I have surgically removed multiple skin cancers, however he has never had a skin check under  my care. He has been seeing another dermatologist but feels it has become challenging to see her then wait to see me for surgical tx given the complexity and number of skin cancers and photodamage he has. To add additional complexity, the pt also spends time in [place] and has had skin cancer diagnosed and treated there as well. The pt c/o multiple lesions on the face, some are bleeding. C/o nail changes to both thumbs asx, no tx tried  C/o lesions on back he picks off, asx  10 x 6 mm keratotic papule on left forearm      Physical Examination       Skin exam of face, eyelids, lips, hands, forearms, scalp, ears, nose nails  1. On the face are tntx clinically apparent nmsc. On the right nlf is a 8mm ulcerated pearly papule  2. On the right cheek there is a large ill-defined variable eroded pink plaque  3. Both thumbnails have green discoloration with onycholysis  4. Back with multiple stuck on brown plaques    Assessment and Plan   Neoplasm of uncertain behavior:  R/O BCC  Location: right nlf  - Discussed possible diagnosis. Patient agreeable to biopsy. Verbal and written consent obtained after risks (infection, bleeding, scar), benefits and alternatives explained. - The area to be biopsied was marked with a surgical marking pen and a verbal time-out was performed. - Local anesthesia was achieved with 1% lidocaine with epinephrine/sodium bicarbonate. - The area was cleansed with alcohol and a tangential shave biopsy was performed using a derma-blade. Hemostasis was achieved with aluminum chloride.   A

## 2023-10-05 LAB — DERMATOLOGY PATHOLOGY REPORT: ABNORMAL

## 2023-10-07 LAB — LEFT VENTRICULAR EJECTION FRACTION, EXTERNAL: 53

## 2023-10-10 ENCOUNTER — TELEPHONE (OUTPATIENT)
Dept: SURGERY | Age: 88
End: 2023-10-10

## 2023-10-10 NOTE — TELEPHONE ENCOUNTER
Called & spoke to PT to review results of the biopsy. Date of biopsy: 10/02/2023    Site of biopsy: A) RT nasolabial fold                         B) LT forearm    Result: A) positive BCC skin CA              B) positive SCC skin CA    Plan: A) return for Mohs procedure           B) return for Excision     The patient expressed understanding of the plan. PT and his wife advised that PT was admitted to the hospital 2 days after seeing us on 10/2/2023 with CHF, he is still in the hospital & at this time does not know when he will be released. Our surgeries will have to go on the back burner until heart treatment situation is advised and completed or being treated. I advised I would let Dr. Marilu Cowart know this info as well as Joellen Church, and PT advised he will call as soon as he is cleared for our treatment and schedule with her.      2:51p - Dr. Marilu Cowart and Joellen Church were advised of the above.

## 2023-10-11 ENCOUNTER — TELEPHONE (OUTPATIENT)
Dept: FAMILY MEDICINE CLINIC | Age: 88
End: 2023-10-11

## 2023-10-11 DIAGNOSIS — S37.009A INJURY OF KIDNEY, UNSPECIFIED LATERALITY, INITIAL ENCOUNTER: Primary | ICD-10-CM

## 2023-10-11 NOTE — TELEPHONE ENCOUNTER
Patient says he just got out the hospital and they said they need more blood work  ordered to check the kidney function. He says send the results to Dr. Rubina Leone with [facility].

## 2023-10-12 NOTE — TELEPHONE ENCOUNTER
Please make hospital follow up phone call per TCM guidelines and arrange for hospital follow up with me tomorrow. Thanks!

## 2023-10-12 NOTE — TELEPHONE ENCOUNTER
Pt wife scheduled appt for 10/17 to give pt more rest time, also lab is pending ask to Dr. Melissa Padilla request.

## 2023-10-14 LAB
BUN / CREAT RATIO: 21 (ref 10–24)
BUN BLDV-MCNC: 32 MG/DL (ref 8–27)
CALCIUM SERPL-MCNC: 8.7 MG/DL (ref 8.6–10.2)
CHLORIDE BLD-SCNC: 96 MMOL/L (ref 96–106)
CO2: 32 MMOL/L (ref 20–29)
CREAT SERPL-MCNC: 1.5 MG/DL (ref 0.76–1.27)
ESTIMATED GLOMERULAR FILTRATION RATE CREATININE EQUATION: 44 ML/MIN/1.73
GLUCOSE BLD-MCNC: 86 MG/DL (ref 70–99)
POTASSIUM SERPL-SCNC: 4.4 MMOL/L (ref 3.5–5.2)
SODIUM BLD-SCNC: 142 MMOL/L (ref 134–144)

## 2023-10-17 ENCOUNTER — OFFICE VISIT (OUTPATIENT)
Dept: FAMILY MEDICINE CLINIC | Age: 88
End: 2023-10-17

## 2023-10-17 VITALS
HEART RATE: 71 BPM | WEIGHT: 211.4 LBS | OXYGEN SATURATION: 96 % | DIASTOLIC BLOOD PRESSURE: 70 MMHG | SYSTOLIC BLOOD PRESSURE: 120 MMHG | HEIGHT: 70 IN | BODY MASS INDEX: 30.26 KG/M2

## 2023-10-17 DIAGNOSIS — Z09 HOSPITAL DISCHARGE FOLLOW-UP: ICD-10-CM

## 2023-10-17 DIAGNOSIS — N18.32 STAGE 3B CHRONIC KIDNEY DISEASE (HCC): ICD-10-CM

## 2023-10-17 DIAGNOSIS — I50.32 CHRONIC HEART FAILURE WITH PRESERVED EJECTION FRACTION (HCC): Primary | ICD-10-CM

## 2023-10-17 DIAGNOSIS — I48.21 PERMANENT ATRIAL FIBRILLATION (HCC): ICD-10-CM

## 2023-10-17 RX ORDER — FUROSEMIDE 40 MG/1
40 TABLET ORAL DAILY
COMMUNITY

## 2023-10-17 RX ORDER — IMIQUIMOD 12.5 MG/.25G
CREAM TOPICAL
COMMUNITY

## 2023-10-17 NOTE — ASSESSMENT & PLAN NOTE
Patient doing well status post ablation heart rate controlled today doing well no issues or concerns. He is doing well on his Eliquis and amiodarone.

## 2023-10-17 NOTE — PROGRESS NOTES
Vaccine Information Sheet, \"Influenza - Inactivated\"  given to Tony Rowland, or parent/legal guardian of  Tony Rowland and verbalized understanding. Patient responses:    Have you ever had a reaction to a flu vaccine? No  Do you have any current illness? No  Have you ever had Guillian Cottonwood Syndrome? No  Do you have a serious allergy to any of the follow: Neomycin, Polymyxin, Thimerosal, eggs or egg products? No    Flu vaccine given per order. Please see immunization tab. Risks and benefits explained. Current VIS given.       Immunizations Administered       Name Date Dose Route    Influenza, FLUAD, (age 72 y+), Adjuvanted, 0.5mL 10/17/2023 0.5 mL Intramuscular    Site: Deltoid- Left    Lot: [id number]    NDC: 84848-007-02
Kelly Gonsalves - CNP               Where to Get Your Medications        These medications were sent to [facility]/pharmacy #[id number]- [address] [phone] - f [phone] [address], [facility] [address]      Phone: [phone]   dapagliflozin 5 MG tablet           Medications marked \"taking\" at this time  Outpatient Medications Marked as Taking for the 10/17/23 encounter (Office Visit) with LIZZ Meng CNP   Medication Sig Dispense Refill    imiquimod (ALDARA) 5 % cream Apply topically three times a week Apply topically three times a week. furosemide (LASIX) 40 MG tablet Take 1 tablet by mouth daily      dapagliflozin (FARXIGA) 5 MG tablet Take 1 tablet by mouth every morning 90 tablet 1    amiodarone (CORDARONE) 200 MG tablet Take 1 tablet by mouth daily      Handicap Placard MISC by Does not apply route Does not  2 each 0    timolol (BETIMOL) 0.5 % ophthalmic solution 1 drop 2 times daily      carvedilol (COREG) 12.5 MG tablet Take 1 tablet by mouth 2 times daily (with meals) 90 tablet 1    apixaban (ELIQUIS) 5 MG TABS tablet Take 1 tablet by mouth 2 times daily 180 tablet 1    Vitamin D (CHOLECALCIFEROL) 1000 UNITS CAPS capsule Take 1 capsule by mouth daily 30 capsule 0    sildenafil (VIAGRA) 100 MG tablet Take 1 tablet by mouth as needed for Erectile Dysfunction 16 tablet 2    simvastatin (ZOCOR) 20 MG tablet TAKE 1 TABLET IN THE EVENING 90 tablet 1    latanoprost (XALATAN) 0.005 % ophthalmic solution Place 1 drop into both eyes nightly      aspirin EC 81 MG EC tablet Take 1 tablet by mouth daily. 30 tablet 12    mercaptopurine (PURINETHOL) 50 MG tablet Take 1 tablet by mouth daily          Medications patient taking as of now reconciled against medications ordered at time of hospital discharge: Yes    A comprehensive review of systems was negative except for what was noted in the HPI.     Objective:    /70 (Site: Left Upper Arm, Position: Sitting, Cuff

## 2023-10-17 NOTE — ASSESSMENT & PLAN NOTE
Reviewed hospital admission, imaging, and labs completed during course of hospitalization. He is doing well overall since discharge from home. He was started on Farxiga and furosemide. He has been taking these medications as prescribed. Repeat BMP was done that showed worsening kidney failure. His BUN and creatinine are increased from discharge on the 10th and his GFR is down from 50-44. Advised patient at this time we will decrease his Lorrain Purpura to 5 mg daily and repeat his labs in 2 weeks. If there continues to be a decline in his kidney function we will also need to back off his furosemide to possibly 20 mg. Advised that he needs to follow-up with cardiology as well and they will be making this appointment for follow-up in November. Advised patient that we will plan to see him back in the office in 1 month unless results and imaging require return to office sooner. Discussed all of the above with patient and his wife who was present at appointment and they are both comfortable with this plan moving forward.

## 2023-10-31 DIAGNOSIS — E78.2 MIXED HYPERLIPIDEMIA: ICD-10-CM

## 2023-10-31 DIAGNOSIS — I50.32 CHRONIC HEART FAILURE WITH PRESERVED EJECTION FRACTION (HCC): ICD-10-CM

## 2023-10-31 DIAGNOSIS — S37.009A INJURY OF KIDNEY, UNSPECIFIED LATERALITY, INITIAL ENCOUNTER: ICD-10-CM

## 2023-10-31 DIAGNOSIS — Z00.00 ENCOUNTER FOR ANNUAL WELLNESS VISIT (AWV) IN MEDICARE PATIENT: ICD-10-CM

## 2023-10-31 DIAGNOSIS — R97.20 ELEVATED PROSTATE SPECIFIC ANTIGEN (PSA): ICD-10-CM

## 2023-10-31 LAB
BASOPHILS # BLD: 0 K/UL (ref 0–0.2)
BASOPHILS NFR BLD: 0.3 %
DEPRECATED RDW RBC AUTO: 16 % (ref 12.4–15.4)
EOSINOPHIL # BLD: 0.1 K/UL (ref 0–0.6)
EOSINOPHIL NFR BLD: 2.2 %
HCT VFR BLD AUTO: 39 % (ref 40.5–52.5)
HGB BLD-MCNC: 13.2 G/DL (ref 13.5–17.5)
LYMPHOCYTES # BLD: 0.7 K/UL (ref 1–5.1)
LYMPHOCYTES NFR BLD: 13.8 %
MCH RBC QN AUTO: 30 PG (ref 26–34)
MCHC RBC AUTO-ENTMCNC: 34 G/DL (ref 31–36)
MCV RBC AUTO: 88.2 FL (ref 80–100)
MONOCYTES # BLD: 0.4 K/UL (ref 0–1.3)
MONOCYTES NFR BLD: 8.3 %
NEUTROPHILS # BLD: 3.7 K/UL (ref 1.7–7.7)
NEUTROPHILS NFR BLD: 75.4 %
PLATELET # BLD AUTO: 210 K/UL (ref 135–450)
PMV BLD AUTO: 8.8 FL (ref 5–10.5)
RBC # BLD AUTO: 4.42 M/UL (ref 4.2–5.9)
WBC # BLD AUTO: 4.9 K/UL (ref 4–11)

## 2023-11-01 LAB
ALBUMIN SERPL-MCNC: 3.7 G/DL (ref 3.4–5)
ALBUMIN/GLOB SERPL: 1.9 {RATIO} (ref 1.1–2.2)
ALP SERPL-CCNC: 81 U/L (ref 40–129)
ALT SERPL-CCNC: 15 U/L (ref 10–40)
ANION GAP SERPL CALCULATED.3IONS-SCNC: 12 MMOL/L (ref 3–16)
AST SERPL-CCNC: 23 U/L (ref 15–37)
BILIRUB SERPL-MCNC: 1.1 MG/DL (ref 0–1)
BUN SERPL-MCNC: 22 MG/DL (ref 7–20)
CALCIUM SERPL-MCNC: 8.4 MG/DL (ref 8.3–10.6)
CHLORIDE SERPL-SCNC: 100 MMOL/L (ref 99–110)
CO2 SERPL-SCNC: 32 MMOL/L (ref 21–32)
CREAT SERPL-MCNC: 1.8 MG/DL (ref 0.8–1.3)
GFR SERPLBLD CREATININE-BSD FMLA CKD-EPI: 35 ML/MIN/{1.73_M2}
GLUCOSE P FAST SERPL-MCNC: 97 MG/DL (ref 70–99)
GLUCOSE SERPL-MCNC: 97 MG/DL (ref 70–99)
NT-PROBNP SERPL-MCNC: 1483 PG/ML (ref 0–449)
POTASSIUM SERPL-SCNC: 4.2 MMOL/L (ref 3.5–5.1)
PROT SERPL-MCNC: 5.7 G/DL (ref 6.4–8.2)
PSA SERPL DL<=0.01 NG/ML-MCNC: 10.96 NG/ML (ref 0–4)
SODIUM SERPL-SCNC: 144 MMOL/L (ref 136–145)

## 2023-11-02 DIAGNOSIS — I50.32 CHRONIC HEART FAILURE WITH PRESERVED EJECTION FRACTION (HCC): Primary | ICD-10-CM

## 2023-11-02 RX ORDER — FUROSEMIDE 20 MG/1
20 TABLET ORAL DAILY
Qty: 90 TABLET | Refills: 0 | Status: SHIPPED | OUTPATIENT
Start: 2023-11-02

## 2023-11-13 DIAGNOSIS — E78.2 MIXED HYPERLIPIDEMIA: ICD-10-CM

## 2023-11-13 DIAGNOSIS — Z00.00 ENCOUNTER FOR ANNUAL WELLNESS VISIT (AWV) IN MEDICARE PATIENT: ICD-10-CM

## 2023-11-13 DIAGNOSIS — I50.32 CHRONIC HEART FAILURE WITH PRESERVED EJECTION FRACTION (HCC): ICD-10-CM

## 2023-11-13 LAB
ALBUMIN SERPL-MCNC: 4 G/DL (ref 3.4–5)
ALBUMIN/GLOB SERPL: 2.1 {RATIO} (ref 1.1–2.2)
ALP SERPL-CCNC: 85 U/L (ref 40–129)
ALT SERPL-CCNC: 20 U/L (ref 10–40)
ANION GAP SERPL CALCULATED.3IONS-SCNC: 7 MMOL/L (ref 3–16)
AST SERPL-CCNC: 22 U/L (ref 15–37)
BASOPHILS # BLD: 0 K/UL (ref 0–0.2)
BASOPHILS NFR BLD: 0.6 %
BILIRUB SERPL-MCNC: 0.9 MG/DL (ref 0–1)
BUN SERPL-MCNC: 19 MG/DL (ref 7–20)
CALCIUM SERPL-MCNC: 8.7 MG/DL (ref 8.3–10.6)
CHLORIDE SERPL-SCNC: 103 MMOL/L (ref 99–110)
CO2 SERPL-SCNC: 32 MMOL/L (ref 21–32)
CREAT SERPL-MCNC: 1.5 MG/DL (ref 0.8–1.3)
DEPRECATED RDW RBC AUTO: 17.2 % (ref 12.4–15.4)
EOSINOPHIL # BLD: 0.1 K/UL (ref 0–0.6)
EOSINOPHIL NFR BLD: 2.6 %
GFR SERPLBLD CREATININE-BSD FMLA CKD-EPI: 44 ML/MIN/{1.73_M2}
GLUCOSE SERPL-MCNC: 85 MG/DL (ref 70–99)
HCT VFR BLD AUTO: 39.5 % (ref 40.5–52.5)
HGB BLD-MCNC: 13.1 G/DL (ref 13.5–17.5)
LYMPHOCYTES # BLD: 0.6 K/UL (ref 1–5.1)
LYMPHOCYTES NFR BLD: 14.6 %
MCH RBC QN AUTO: 29.5 PG (ref 26–34)
MCHC RBC AUTO-ENTMCNC: 33.1 G/DL (ref 31–36)
MCV RBC AUTO: 88.9 FL (ref 80–100)
MONOCYTES # BLD: 0.4 K/UL (ref 0–1.3)
MONOCYTES NFR BLD: 10.2 %
NEUTROPHILS # BLD: 3.1 K/UL (ref 1.7–7.7)
NEUTROPHILS NFR BLD: 72 %
NT-PROBNP SERPL-MCNC: 1362 PG/ML (ref 0–449)
PLATELET # BLD AUTO: 177 K/UL (ref 135–450)
PMV BLD AUTO: 9 FL (ref 5–10.5)
POTASSIUM SERPL-SCNC: 4.7 MMOL/L (ref 3.5–5.1)
PROT SERPL-MCNC: 5.9 G/DL (ref 6.4–8.2)
RBC # BLD AUTO: 4.44 M/UL (ref 4.2–5.9)
SODIUM SERPL-SCNC: 142 MMOL/L (ref 136–145)
WBC # BLD AUTO: 4.3 K/UL (ref 4–11)

## 2023-11-16 ENCOUNTER — OFFICE VISIT (OUTPATIENT)
Dept: FAMILY MEDICINE CLINIC | Age: 88
End: 2023-11-16
Payer: MEDICARE

## 2023-11-16 VITALS
BODY MASS INDEX: 29.7 KG/M2 | WEIGHT: 207 LBS | DIASTOLIC BLOOD PRESSURE: 84 MMHG | SYSTOLIC BLOOD PRESSURE: 140 MMHG | OXYGEN SATURATION: 97 % | HEART RATE: 54 BPM | TEMPERATURE: 97.5 F

## 2023-11-16 DIAGNOSIS — I50.32 CHRONIC HEART FAILURE WITH PRESERVED EJECTION FRACTION (HCC): Primary | ICD-10-CM

## 2023-11-16 DIAGNOSIS — N18.32 STAGE 3B CHRONIC KIDNEY DISEASE (HCC): ICD-10-CM

## 2023-11-16 PROBLEM — E11.9 TYPE 2 DIABETES MELLITUS (HCC): Status: ACTIVE | Noted: 2023-11-16

## 2023-11-16 PROCEDURE — G8427 DOCREV CUR MEDS BY ELIG CLIN: HCPCS | Performed by: NURSE PRACTITIONER

## 2023-11-16 PROCEDURE — 99214 OFFICE O/P EST MOD 30 MIN: CPT | Performed by: NURSE PRACTITIONER

## 2023-11-16 PROCEDURE — G8417 CALC BMI ABV UP PARAM F/U: HCPCS | Performed by: NURSE PRACTITIONER

## 2023-11-16 PROCEDURE — 1123F ACP DISCUSS/DSCN MKR DOCD: CPT | Performed by: NURSE PRACTITIONER

## 2023-11-16 PROCEDURE — G8484 FLU IMMUNIZE NO ADMIN: HCPCS | Performed by: NURSE PRACTITIONER

## 2023-11-16 PROCEDURE — 4004F PT TOBACCO SCREEN RCVD TLK: CPT | Performed by: NURSE PRACTITIONER

## 2023-11-16 ASSESSMENT — ENCOUNTER SYMPTOMS
COLOR CHANGE: 0
VOMITING: 0
DIARRHEA: 0
EYE ITCHING: 0
BACK PAIN: 0
CHEST TIGHTNESS: 0
ABDOMINAL PAIN: 0
SINUS PRESSURE: 0
EYE REDNESS: 0
WHEEZING: 0
SORE THROAT: 0
BLOOD IN STOOL: 0
NAUSEA: 0
CONSTIPATION: 0
COUGH: 0
SHORTNESS OF BREATH: 1
RHINORRHEA: 0

## 2023-11-16 NOTE — ASSESSMENT & PLAN NOTE
Reviewed labs, renal function much improved after dose adjustments with diuretics. Will continue to follow up with cardiology as scheduled. Weights are stable, he is fatigued with exertional dyspnea, but still significantly improved from prior baseline.    Lab Results   Component Value Date     11/13/2023    K 4.7 11/13/2023     11/13/2023    CO2 32 11/13/2023    BUN 19 11/13/2023    CREATININE 1.5 (H) 11/13/2023    GLUCOSE 85 11/13/2023    CALCIUM 8.7 11/13/2023    PROT 5.9 (L) 11/13/2023    LABALBU 4.0 11/13/2023    BILITOT 0.9 11/13/2023    ALKPHOS 85 11/13/2023    AST 22 11/13/2023    ALT 20 11/13/2023    LABGLOM 44 (A) 11/13/2023    GFRAA >60 07/22/2022    AGRATIO 2.1 11/13/2023    GLOB 2.4 08/02/2023

## 2023-11-16 NOTE — PROGRESS NOTES
Barbara Carey (:  1934) is a 80 y.o. male,Established patient, here for evaluation of the following chief complaint(s):  Discuss Labs      ASSESSMENT/PLAN:  1. Chronic heart failure with preserved ejection fraction [facility])  Assessment & Plan:  Reviewed labs, renal function much improved after dose adjustments with diuretics. Will continue to follow up with cardiology as scheduled. Weights are stable, he is fatigued with exertional dyspnea, but still significantly improved from prior baseline. Lab Results   Component Value Date     2023    K 4.7 2023     2023    CO2 32 2023    BUN 19 2023    CREATININE 1.5 (H) 2023    GLUCOSE 85 2023    CALCIUM 8.7 2023    PROT 5.9 (L) 2023    LABALBU 4.0 2023    BILITOT 0.9 2023    ALKPHOS 85 2023    AST 22 2023    ALT 20 2023    LABGLOM 44 (A) 2023    GFRAA >60 2022    AGRATIO 2.1 2023    GLOB 2.4 2023           2. Stage 3b chronic kidney disease ([address])  Assessment & Plan:  Stable, controlled  No changes  Continue current treatment plan         No follow-ups on file. SUBJECTIVE/OBJECTIVE:    Pt presents to office for ongoing management of heart failure and chronic renal disease. Pt reports he feels well overall. Pt reports recent weight loss related to medication regimen. Pt reports decreased exercise tolerance and SOB with walking. Pt inquires about exercise recommendations to build stamina. Recently had labs completed     No additional concerns per pt. Current Outpatient Medications   Medication Sig Dispense Refill    furosemide (LASIX) 20 MG tablet Take 1 tablet by mouth daily 90 tablet 0    imiquimod (ALDARA) 5 % cream Apply topically three times a week Apply topically three times a week.       dapagliflozin (FARXIGA) 5 MG tablet Take 1 tablet by mouth every morning 90 tablet 1    amiodarone (CORDARONE) 200 MG tablet Take 1 tablet by mouth daily

## 2023-12-11 ENCOUNTER — TELEPHONE (OUTPATIENT)
Dept: FAMILY MEDICINE CLINIC | Age: 88
End: 2023-12-11

## 2023-12-11 NOTE — TELEPHONE ENCOUNTER
PT IS ASKING FOR A CALL BACK FROM [facility] BECAUSE HIS HEART DOCTOR HAS TOLD HIM THAT HE DOESN'T NEED TO TAKE THE FARXIGA 5 MG. HE IS VERY CONFUSED AND WOULD LIKE TO SPEAK TO HER DIRECTLY.        dapagliflozin (FARXIGA) 5 MG tablet [[id number]]    Order Details  Dose: 5 mg Route: Oral Frequency: EVERY MORNING   Dispense Quantity: 90 tablet Refills: 1          Sig: Take 1 tablet by mouth every morning         Start Date: 10/17/23 End Date: --   Written Date: 10/17/23 Expiration Date: 10/16/24   Providers      Authorizing Provider: Polly Alcala APRN - CNP NPI: [id number]   Ordering User: Polly Alcala APRN - CNP

## 2023-12-11 NOTE — TELEPHONE ENCOUNTER
Pt wife is asking for a call back to her because Mr. Garcia phone is acting up. Her phone number is [phone]

## 2024-01-08 ENCOUNTER — TELEPHONE (OUTPATIENT)
Dept: SURGERY | Age: 89
End: 2024-01-08

## 2024-01-08 NOTE — TELEPHONE ENCOUNTER
The patient walked in to let Dr. Jacques know that he's cancelling his skin check for tomorrow 1/9 at 2:30 pm due to Dr. Haynes offering him the \"Gentle Cure\" treatment in her office and wants to try this out.  He provided a brochure of the treatment and I will scan it into his chart for Dr. Jacques's review.     He said if this tx does not work he would still like to come back to have skin checks.

## 2024-02-15 ENCOUNTER — OFFICE VISIT (OUTPATIENT)
Dept: FAMILY MEDICINE CLINIC | Age: 89
End: 2024-02-15

## 2024-02-15 VITALS
WEIGHT: 209 LBS | TEMPERATURE: 97.8 F | BODY MASS INDEX: 29.99 KG/M2 | SYSTOLIC BLOOD PRESSURE: 122 MMHG | OXYGEN SATURATION: 97 % | RESPIRATION RATE: 16 BRPM | HEART RATE: 54 BPM | DIASTOLIC BLOOD PRESSURE: 62 MMHG

## 2024-02-15 DIAGNOSIS — E11.22 TYPE 2 DIABETES MELLITUS WITH STAGE 3A CHRONIC KIDNEY DISEASE, WITHOUT LONG-TERM CURRENT USE OF INSULIN (HCC): ICD-10-CM

## 2024-02-15 DIAGNOSIS — I48.21 PERMANENT ATRIAL FIBRILLATION (HCC): ICD-10-CM

## 2024-02-15 DIAGNOSIS — H61.23 BILATERAL IMPACTED CERUMEN: ICD-10-CM

## 2024-02-15 DIAGNOSIS — E11.65 TYPE 2 DIABETES MELLITUS WITH HYPERGLYCEMIA, WITHOUT LONG-TERM CURRENT USE OF INSULIN (HCC): ICD-10-CM

## 2024-02-15 DIAGNOSIS — K51.00 ULCERATIVE PANCOLITIS (HCC): ICD-10-CM

## 2024-02-15 DIAGNOSIS — N18.31 TYPE 2 DIABETES MELLITUS WITH STAGE 3A CHRONIC KIDNEY DISEASE, WITHOUT LONG-TERM CURRENT USE OF INSULIN (HCC): ICD-10-CM

## 2024-02-15 DIAGNOSIS — N18.32 STAGE 3B CHRONIC KIDNEY DISEASE (HCC): ICD-10-CM

## 2024-02-15 DIAGNOSIS — I50.32 CHRONIC HEART FAILURE WITH PRESERVED EJECTION FRACTION (HCC): Primary | ICD-10-CM

## 2024-02-15 NOTE — PROGRESS NOTES
and regular rhythm.      Pulses: Normal pulses.      Heart sounds: Normal heart sounds.   Pulmonary:      Effort: Pulmonary effort is normal.      Breath sounds: Normal breath sounds.   Lymphadenopathy:      Head:      Right side of head: No submental, submandibular, tonsillar, preauricular, posterior auricular or occipital adenopathy.      Left side of head: No submental, submandibular, tonsillar, preauricular, posterior auricular or occipital adenopathy.   Skin:     General: Skin is warm and dry.   Neurological:      Mental Status: He is alert.   Psychiatric:         Mood and Affect: Mood normal.         Behavior: Behavior normal.                 An electronic signature was used to authenticate this note.    --Polly Alcala, LIZZ - CNP

## 2024-02-15 NOTE — PATIENT INSTRUCTIONS
Decrease carvedilol to 6.25 mg twice a day-- split this pills you have at home in half  Check blood pressure and heart rate daily and call after one week with levels

## 2024-02-15 NOTE — ASSESSMENT & PLAN NOTE
bilatearl canal cleared of large amt cerumen after H2O2 dwell and manual extraction with instrumentation.  TM intact with mild erythema and improved hearing.  Canal clear with mild erythema  Patient tolerated procedure well

## 2024-02-15 NOTE — ASSESSMENT & PLAN NOTE
Slightly bradycardic in office and has been noticing low pulse at home. We will decrease coreg to 6.25 mg bid and monitor blood pressure and pulse daily for the next week.

## 2024-02-19 ENCOUNTER — TELEPHONE (OUTPATIENT)
Dept: FAMILY MEDICINE CLINIC | Age: 89
End: 2024-02-19

## 2024-02-19 NOTE — TELEPHONE ENCOUNTER
Patient would like to speak with Polly directly concerning one of his medications. I tried to get more information but patient is hard of hearing. Please call patient. Thanks

## 2024-02-23 ENCOUNTER — TELEPHONE (OUTPATIENT)
Dept: FAMILY MEDICINE CLINIC | Age: 89
End: 2024-02-23

## 2024-02-23 NOTE — TELEPHONE ENCOUNTER
2/18/24 Sunday   136/75     58  Mon         115/66    60  Tuesday   110/63   59  Wednesday  97/64  63  Thursday      140/67  58  Friday          94/53  73    Blood pressure readings from the week.     Pt wants to know what to do further. Continue taking medication as prescribed or change dosage

## 2024-02-26 NOTE — TELEPHONE ENCOUNTER
Left detailed message for pt to continue same regimen and to give us a call if they have any questions

## 2024-02-27 ENCOUNTER — APPOINTMENT (OUTPATIENT)
Age: 89
End: 2024-02-27
Payer: MEDICARE

## 2024-02-27 ENCOUNTER — HOSPITAL ENCOUNTER (EMERGENCY)
Age: 89
Discharge: HOME | End: 2024-02-27
Attending: EMERGENCY MEDICINE
Payer: MEDICARE

## 2024-02-27 VITALS
RESPIRATION RATE: 16 BRPM | SYSTOLIC BLOOD PRESSURE: 180 MMHG | OXYGEN SATURATION: 96 % | TEMPERATURE: 98.3 F | DIASTOLIC BLOOD PRESSURE: 89 MMHG | HEIGHT: 70 IN | WEIGHT: 211 LBS | BODY MASS INDEX: 30.21 KG/M2 | HEART RATE: 62 BPM

## 2024-02-27 DIAGNOSIS — S82.891A CLOSED FRACTURE OF RIGHT ANKLE, INITIAL ENCOUNTER: Primary | ICD-10-CM

## 2024-02-27 PROCEDURE — 73630 X-RAY EXAM OF FOOT: CPT

## 2024-02-27 PROCEDURE — 29505 APPLICATION LONG LEG SPLINT: CPT

## 2024-02-27 PROCEDURE — 6370000000 HC RX 637 (ALT 250 FOR IP): Performed by: EMERGENCY MEDICINE

## 2024-02-27 PROCEDURE — 73610 X-RAY EXAM OF ANKLE: CPT

## 2024-02-27 PROCEDURE — 29105 APPLICATION LONG ARM SPLINT: CPT

## 2024-02-27 PROCEDURE — 99283 EMERGENCY DEPT VISIT LOW MDM: CPT

## 2024-02-27 RX ORDER — HYDROCODONE BITARTRATE AND ACETAMINOPHEN 5; 325 MG/1; MG/1
1 TABLET ORAL
Status: COMPLETED | OUTPATIENT
Start: 2024-02-27 | End: 2024-02-27

## 2024-02-27 RX ORDER — HYDROCODONE BITARTRATE AND ACETAMINOPHEN 5; 325 MG/1; MG/1
1 TABLET ORAL EVERY 4 HOURS PRN
Qty: 18 TABLET | Refills: 0 | Status: SHIPPED | OUTPATIENT
Start: 2024-02-27 | End: 2024-03-01

## 2024-02-27 RX ADMIN — HYDROCODONE BITARTRATE AND ACETAMINOPHEN 1 TABLET: 5; 325 TABLET ORAL at 12:25

## 2024-02-27 ASSESSMENT — PAIN DESCRIPTION - LOCATION
LOCATION: ANKLE
LOCATION: ANKLE

## 2024-02-27 ASSESSMENT — PAIN DESCRIPTION - ORIENTATION
ORIENTATION: RIGHT
ORIENTATION: RIGHT

## 2024-02-27 ASSESSMENT — PAIN SCALES - GENERAL
PAINLEVEL_OUTOF10: 0
PAINLEVEL_OUTOF10: 4

## 2024-02-27 ASSESSMENT — PAIN DESCRIPTION - DESCRIPTORS: DESCRIPTORS: DISCOMFORT

## 2024-02-27 ASSESSMENT — PAIN - FUNCTIONAL ASSESSMENT: PAIN_FUNCTIONAL_ASSESSMENT: 0-10

## 2024-02-27 NOTE — ED NOTES
Pt refusing the crutches. Pt states \"I can't use those things, I do not want them\". Pt educated that he should be non weight bearing on the right ankle. Pt still refusing the crutches and states \"I will use my walker\". Provider notified.

## 2024-02-27 NOTE — DISCHARGE INSTRUCTIONS
It is very important that you follow-up very closely with orthopedic surgery as this is an unstable fracture and will not heal well without surgery    Please return for any concern

## 2024-02-27 NOTE — ED PROVIDER NOTES
Reactions    Iodine      **IVP DYES**       PHYSICAL EXAM  VITAL SIGNS:    ED Triage Vitals [02/27/24 1213]   Enc Vitals Group      BP (!) 170/87      Pulse 60      Respirations 16      Temp 98.3 °F (36.8 °C)      Temp Source Oral      SpO2 100 %      Weight - Scale 95.7 kg (211 lb)      Height 1.778 m (5' 10\")      Head Circumference       Peak Flow       Pain Score       Pain Loc       Pain Edu?       Excl. in GC?       Physical Exam  Vitals and nursing note reviewed.   Constitutional:       General: He is not in acute distress.     Appearance: He is not ill-appearing or toxic-appearing.   HENT:      Head: Normocephalic and atraumatic.      Mouth/Throat:      Mouth: Mucous membranes are moist.   Cardiovascular:      Rate and Rhythm: Normal rate and regular rhythm.   Pulmonary:      Effort: Pulmonary effort is normal. No respiratory distress.   Musculoskeletal:      Comments: Deformity of right ankle with minimal tenderness.  Unable to remove on own.  Neurovascular intact distally.  Overlying swelling and ecchymosis   Skin:     General: Skin is warm and dry.      Findings: No rash.   Neurological:      Mental Status: He is alert and oriented to person, place, and time.      Comments: Decreased sensation bilateral legs consistent with neuropathy   Psychiatric:         Mood and Affect: Mood normal.         Behavior: Behavior normal.        BRIEF DIFFERENTIAL DIAGNOSIS  1.  Fracture  2.  Dislocation  3.  Sprain    INDEPENDENT EKG INTERPRETATION:  None    LABS  I have personally reviewed all labs for this visit.   No results found for this visit on 02/27/24.    RADIOLOGY  Any applicable radiology studies including x-ray, CT, MRI, and/or ultrasound, were reviewed independently by me in addition to the radiologist.  I reviewed all radiology images and reports as well from this evaluation.  XR FOOT RIGHT (MIN 3 VIEWS)   Final Result   No acute right foot osseous abnormality.      XR ANKLE RIGHT (MIN 3 VIEWS)   Final

## 2024-02-27 NOTE — ED TRIAGE NOTES
Patient and patients wife oriented to room and ED throughput process.  Safety measures with ED bed locked in lowest position and call light in reach.  Patient educated on all orders, including any medications.  Patient educated on chief complaint/symptoms. Patient encouraged to ask questions regarding care, medications or treatment plan.  Patient aware of how to reach staff with questions/concerns.

## 2024-02-27 NOTE — ED NOTES
Patient prepared for and ready to be discharged. Patient discharged at this time to home in care of self in no acute distress after verbalizing understanding of discharge instructions. Patient left after receiving After Visit Summary instructions. Pt left in a wheelchair. Pt instructed on narcotic safety, instructed not to drive, operate heavy machinery or drink alcohol while taking narcotic medications. Pt Verbalizes understanding of these verbal and written instructions. Pt states his wife will be driving him.

## 2024-02-28 ENCOUNTER — TELEPHONE (OUTPATIENT)
Dept: FAMILY MEDICINE CLINIC | Age: 89
End: 2024-02-28

## 2024-02-28 NOTE — TELEPHONE ENCOUNTER
Patient wife called and her   broke his ankle yesterday. He was given  HYDROcodone-acetaminophen (NORCO) 5-325 MG per tablet for pain,but it is making him dizzy and lightheaded when he stands up. Should he continue taking the pain med ? Darline phone number is [phone]

## 2024-02-28 NOTE — TELEPHONE ENCOUNTER
Spoke to pt and advise her that this is a normal side effect of medication.I advise pt to either stop taking medication if its too much or take half of the Norco pill.

## 2024-03-01 ENCOUNTER — HOSPITAL ENCOUNTER (OUTPATIENT)
Age: 89
Setting detail: OUTPATIENT SURGERY
Discharge: HOME | End: 2024-03-01
Attending: ORTHOPAEDIC SURGERY | Admitting: ORTHOPAEDIC SURGERY
Payer: MEDICARE

## 2024-03-01 ENCOUNTER — ANESTHESIA EVENT (OUTPATIENT)
Age: 89
End: 2024-03-01
Payer: MEDICARE

## 2024-03-01 ENCOUNTER — APPOINTMENT (OUTPATIENT)
Age: 89
End: 2024-03-01
Attending: ORTHOPAEDIC SURGERY
Payer: MEDICARE

## 2024-03-01 ENCOUNTER — ANESTHESIA (OUTPATIENT)
Age: 89
End: 2024-03-01
Payer: MEDICARE

## 2024-03-01 VITALS
TEMPERATURE: 97.8 F | DIASTOLIC BLOOD PRESSURE: 78 MMHG | BODY MASS INDEX: 30.42 KG/M2 | OXYGEN SATURATION: 96 % | HEIGHT: 70 IN | HEART RATE: 63 BPM | RESPIRATION RATE: 20 BRPM | WEIGHT: 212.5 LBS | SYSTOLIC BLOOD PRESSURE: 150 MMHG

## 2024-03-01 PROBLEM — S82.891A CLOSED FRACTURE DISLOCATION OF RIGHT ANKLE JOINT: Status: ACTIVE | Noted: 2024-03-01

## 2024-03-01 PROBLEM — S93.431A SYNDESMOTIC DISRUPTION OF RIGHT ANKLE: Status: ACTIVE | Noted: 2024-03-01

## 2024-03-01 PROCEDURE — 6360000002 HC RX W HCPCS: Performed by: ORTHOPAEDIC SURGERY

## 2024-03-01 PROCEDURE — 2500000003 HC RX 250 WO HCPCS: Performed by: NURSE ANESTHETIST, CERTIFIED REGISTERED

## 2024-03-01 PROCEDURE — 27848 TREAT ANKLE DISLOCATION: CPT | Performed by: ORTHOPAEDIC SURGERY

## 2024-03-01 PROCEDURE — 76000 FLUOROSCOPY <1 HR PHYS/QHP: CPT

## 2024-03-01 PROCEDURE — 7100000000 HC PACU RECOVERY - FIRST 15 MIN: Performed by: ORTHOPAEDIC SURGERY

## 2024-03-01 PROCEDURE — 2580000003 HC RX 258: Performed by: NURSE ANESTHETIST, CERTIFIED REGISTERED

## 2024-03-01 PROCEDURE — 6360000002 HC RX W HCPCS: Performed by: NURSE ANESTHETIST, CERTIFIED REGISTERED

## 2024-03-01 PROCEDURE — 2709999900 HC NON-CHARGEABLE SUPPLY: Performed by: ORTHOPAEDIC SURGERY

## 2024-03-01 PROCEDURE — 2580000003 HC RX 258: Performed by: ORTHOPAEDIC SURGERY

## 2024-03-01 PROCEDURE — 27829 TREAT LOWER LEG JOINT: CPT | Performed by: ORTHOPAEDIC SURGERY

## 2024-03-01 PROCEDURE — C1713 ANCHOR/SCREW BN/BN,TIS/BN: HCPCS | Performed by: ORTHOPAEDIC SURGERY

## 2024-03-01 PROCEDURE — 99223 1ST HOSP IP/OBS HIGH 75: CPT | Performed by: ORTHOPAEDIC SURGERY

## 2024-03-01 PROCEDURE — 7100000001 HC PACU RECOVERY - ADDTL 15 MIN: Performed by: ORTHOPAEDIC SURGERY

## 2024-03-01 PROCEDURE — 7100000011 HC PHASE II RECOVERY - ADDTL 15 MIN: Performed by: ORTHOPAEDIC SURGERY

## 2024-03-01 PROCEDURE — 3700000001 HC ADD 15 MINUTES (ANESTHESIA): Performed by: ORTHOPAEDIC SURGERY

## 2024-03-01 PROCEDURE — A4217 STERILE WATER/SALINE, 500 ML: HCPCS | Performed by: ORTHOPAEDIC SURGERY

## 2024-03-01 PROCEDURE — 7100000010 HC PHASE II RECOVERY - FIRST 15 MIN: Performed by: ORTHOPAEDIC SURGERY

## 2024-03-01 PROCEDURE — 3600000004 HC SURGERY LEVEL 4 BASE: Performed by: ORTHOPAEDIC SURGERY

## 2024-03-01 PROCEDURE — 3700000000 HC ANESTHESIA ATTENDED CARE: Performed by: ORTHOPAEDIC SURGERY

## 2024-03-01 PROCEDURE — 3600000014 HC SURGERY LEVEL 4 ADDTL 15MIN: Performed by: ORTHOPAEDIC SURGERY

## 2024-03-01 PROCEDURE — 2720000010 HC SURG SUPPLY STERILE: Performed by: ORTHOPAEDIC SURGERY

## 2024-03-01 DEVICE — SCREW BNE L18MM DIA3.5MM HD DIA2.7MM PERIARTC CORT S STL ST: Type: IMPLANTABLE DEVICE | Site: ANKLE | Status: FUNCTIONAL

## 2024-03-01 DEVICE — SCREW BNE L16MM DIA3.5MM HD DIA2.7MM PERIARTC CORT S STL ST: Type: IMPLANTABLE DEVICE | Site: ANKLE | Status: FUNCTIONAL

## 2024-03-01 DEVICE — SCREW BNE L18MM DIA2.7MM CORT ANK FT S STL ST CANN: Type: IMPLANTABLE DEVICE | Site: ANKLE | Status: FUNCTIONAL

## 2024-03-01 DEVICE — SCREW BNE L18MM DIA2.7MM HEX HD DIA2.5MM CANC BIODUR 108C: Type: IMPLANTABLE DEVICE | Site: ANKLE | Status: FUNCTIONAL

## 2024-03-01 DEVICE — SCREW BNE L20MM DIA3.5MM HD DIA2.7MM CORT PERIARTC S STL ST: Type: IMPLANTABLE DEVICE | Site: ANKLE | Status: FUNCTIONAL

## 2024-03-01 DEVICE — SCREW BNE L56MM DIA3.5MM CORT PERIARTC S STL ST: Type: IMPLANTABLE DEVICE | Site: ANKLE | Status: FUNCTIONAL

## 2024-03-01 DEVICE — SCREW BNE L60MM DIA3.5MM HD DIA2.7MM PERIARTC CORT S STL ST: Type: IMPLANTABLE DEVICE | Site: ANKLE | Status: FUNCTIONAL

## 2024-03-01 DEVICE — PLATE BNE L106MM 6 H R LAT DST PERIARTC FIBULAR S STL LOK: Type: IMPLANTABLE DEVICE | Site: ANKLE | Status: FUNCTIONAL

## 2024-03-01 DEVICE — SCREW BNE L20MM DIA2.7MM HEX HD DIA2.5MM CANC BIODUR 108C: Type: IMPLANTABLE DEVICE | Site: ANKLE | Status: FUNCTIONAL

## 2024-03-01 DEVICE — SCREW BONE L22MM D27MM HEX HEAD D25MM CNCLLS BDR 108C SELF T: Type: IMPLANTABLE DEVICE | Site: ANKLE | Status: FUNCTIONAL

## 2024-03-01 RX ORDER — FENTANYL CITRATE 50 UG/ML
50 INJECTION, SOLUTION INTRAMUSCULAR; INTRAVENOUS EVERY 5 MIN PRN
Status: DISCONTINUED | OUTPATIENT
Start: 2024-03-01 | End: 2024-03-01 | Stop reason: HOSPADM

## 2024-03-01 RX ORDER — BUPIVACAINE HYDROCHLORIDE 5 MG/ML
INJECTION, SOLUTION EPIDURAL; INTRACAUDAL PRN
Status: DISCONTINUED | OUTPATIENT
Start: 2024-03-01 | End: 2024-03-01 | Stop reason: ALTCHOICE

## 2024-03-01 RX ORDER — LIDOCAINE HYDROCHLORIDE 20 MG/ML
INJECTION, SOLUTION INTRAVENOUS PRN
Status: DISCONTINUED | OUTPATIENT
Start: 2024-03-01 | End: 2024-03-01 | Stop reason: SDUPTHER

## 2024-03-01 RX ORDER — ONDANSETRON 2 MG/ML
4 INJECTION INTRAMUSCULAR; INTRAVENOUS
Status: DISCONTINUED | OUTPATIENT
Start: 2024-03-01 | End: 2024-03-01 | Stop reason: HOSPADM

## 2024-03-01 RX ORDER — ONDANSETRON 2 MG/ML
INJECTION INTRAMUSCULAR; INTRAVENOUS PRN
Status: DISCONTINUED | OUTPATIENT
Start: 2024-03-01 | End: 2024-03-01 | Stop reason: SDUPTHER

## 2024-03-01 RX ORDER — SODIUM CHLORIDE 0.9 % (FLUSH) 0.9 %
5-40 SYRINGE (ML) INJECTION PRN
Status: DISCONTINUED | OUTPATIENT
Start: 2024-03-01 | End: 2024-03-01 | Stop reason: HOSPADM

## 2024-03-01 RX ORDER — SODIUM CHLORIDE 9 MG/ML
INJECTION, SOLUTION INTRAVENOUS CONTINUOUS
Status: DISCONTINUED | OUTPATIENT
Start: 2024-03-01 | End: 2024-03-01 | Stop reason: HOSPADM

## 2024-03-01 RX ORDER — DEXAMETHASONE SODIUM PHOSPHATE 4 MG/ML
INJECTION, SOLUTION INTRA-ARTICULAR; INTRALESIONAL; INTRAMUSCULAR; INTRAVENOUS; SOFT TISSUE PRN
Status: DISCONTINUED | OUTPATIENT
Start: 2024-03-01 | End: 2024-03-01 | Stop reason: SDUPTHER

## 2024-03-01 RX ORDER — FENTANYL CITRATE 50 UG/ML
INJECTION, SOLUTION INTRAMUSCULAR; INTRAVENOUS PRN
Status: DISCONTINUED | OUTPATIENT
Start: 2024-03-01 | End: 2024-03-01 | Stop reason: SDUPTHER

## 2024-03-01 RX ORDER — GENTAMICIN SULFATE 40 MG/ML
INJECTION, SOLUTION INTRAMUSCULAR; INTRAVENOUS PRN
Status: DISCONTINUED | OUTPATIENT
Start: 2024-03-01 | End: 2024-03-01 | Stop reason: ALTCHOICE

## 2024-03-01 RX ORDER — SODIUM CHLORIDE 0.9 % (FLUSH) 0.9 %
5-40 SYRINGE (ML) INJECTION EVERY 12 HOURS SCHEDULED
Status: DISCONTINUED | OUTPATIENT
Start: 2024-03-01 | End: 2024-03-01 | Stop reason: HOSPADM

## 2024-03-01 RX ORDER — CEPHALEXIN 500 MG/1
500 CAPSULE ORAL 4 TIMES DAILY
Qty: 20 CAPSULE | Refills: 0 | Status: SHIPPED | OUTPATIENT
Start: 2024-03-01 | End: 2024-03-06

## 2024-03-01 RX ORDER — PROPOFOL 10 MG/ML
INJECTION, EMULSION INTRAVENOUS PRN
Status: DISCONTINUED | OUTPATIENT
Start: 2024-03-01 | End: 2024-03-01 | Stop reason: SDUPTHER

## 2024-03-01 RX ORDER — FENTANYL CITRATE 50 UG/ML
25 INJECTION, SOLUTION INTRAMUSCULAR; INTRAVENOUS EVERY 5 MIN PRN
Status: DISCONTINUED | OUTPATIENT
Start: 2024-03-01 | End: 2024-03-01 | Stop reason: HOSPADM

## 2024-03-01 RX ORDER — SODIUM CHLORIDE 9 MG/ML
INJECTION, SOLUTION INTRAVENOUS CONTINUOUS PRN
Status: DISCONTINUED | OUTPATIENT
Start: 2024-03-01 | End: 2024-03-01 | Stop reason: SDUPTHER

## 2024-03-01 RX ORDER — SODIUM CHLORIDE 9 MG/ML
INJECTION, SOLUTION INTRAVENOUS PRN
Status: DISCONTINUED | OUTPATIENT
Start: 2024-03-01 | End: 2024-03-01 | Stop reason: HOSPADM

## 2024-03-01 RX ORDER — EPHEDRINE SULFATE 50 MG/ML
INJECTION INTRAVENOUS PRN
Status: DISCONTINUED | OUTPATIENT
Start: 2024-03-01 | End: 2024-03-01 | Stop reason: SDUPTHER

## 2024-03-01 RX ORDER — MAGNESIUM HYDROXIDE 1200 MG/15ML
LIQUID ORAL CONTINUOUS PRN
Status: COMPLETED | OUTPATIENT
Start: 2024-03-01 | End: 2024-03-01

## 2024-03-01 RX ADMIN — FENTANYL CITRATE 25 MCG: 50 INJECTION, SOLUTION INTRAMUSCULAR; INTRAVENOUS at 12:43

## 2024-03-01 RX ADMIN — SODIUM CHLORIDE: 9 INJECTION, SOLUTION INTRAVENOUS at 12:29

## 2024-03-01 RX ADMIN — ONDANSETRON 4 MG: 2 INJECTION INTRAMUSCULAR; INTRAVENOUS at 12:45

## 2024-03-01 RX ADMIN — FENTANYL CITRATE 25 MCG: 50 INJECTION, SOLUTION INTRAMUSCULAR; INTRAVENOUS at 12:59

## 2024-03-01 RX ADMIN — DEXAMETHASONE SODIUM PHOSPHATE 4 MG: 4 INJECTION INTRA-ARTICULAR; INTRALESIONAL; INTRAMUSCULAR; INTRAVENOUS; SOFT TISSUE at 12:45

## 2024-03-01 RX ADMIN — EPHEDRINE SULFATE 10 MG: 50 INJECTION, SOLUTION INTRAVENOUS at 12:48

## 2024-03-01 RX ADMIN — EPHEDRINE SULFATE 10 MG: 50 INJECTION, SOLUTION INTRAVENOUS at 13:24

## 2024-03-01 RX ADMIN — SODIUM CHLORIDE: 9 INJECTION, SOLUTION INTRAVENOUS at 12:33

## 2024-03-01 RX ADMIN — CEFAZOLIN 2000 MG: 2 INJECTION, POWDER, FOR SOLUTION INTRAMUSCULAR; INTRAVENOUS at 12:42

## 2024-03-01 RX ADMIN — EPHEDRINE SULFATE 10 MG: 50 INJECTION, SOLUTION INTRAVENOUS at 13:21

## 2024-03-01 RX ADMIN — EPHEDRINE SULFATE 10 MG: 50 INJECTION, SOLUTION INTRAVENOUS at 13:30

## 2024-03-01 RX ADMIN — EPHEDRINE SULFATE 10 MG: 50 INJECTION, SOLUTION INTRAVENOUS at 12:47

## 2024-03-01 RX ADMIN — LIDOCAINE HYDROCHLORIDE 60 MG: 20 INJECTION, SOLUTION INTRAVENOUS at 12:41

## 2024-03-01 RX ADMIN — PROPOFOL 100 MG: 10 INJECTION, EMULSION INTRAVENOUS at 12:41

## 2024-03-01 ASSESSMENT — PAIN SCALES - GENERAL
PAINLEVEL_OUTOF10: 0
PAINLEVEL_OUTOF10: 0

## 2024-03-01 NOTE — BRIEF OP NOTE
Brief Postoperative Note      Patient: Edmundo Garcia  YOB: 1934  MRN: [id number]    Date of Procedure: 3/1/2024    Pre-Op Diagnosis Codes:     * Closed fracture of right ankle fracture dislocation with syndesmosis disruption.    Post-Op Diagnosis: Same       Procedure(s):  RIGHT ANKLE FRACTURE DISLOCATION OPEN REDUCTION INTERNAL FIXATION WITH SYNDESMOSIS REPAIR.    Surgeon(s):  Dania Carranza MD    Assistant:  Surgical Assistant: Morales Florez    Anesthesia: General    Estimated Blood Loss (mL): Minimal    Complications: None    Specimens:   * No specimens in log *    Implants:  * No implants in log *      Drains: * No LDAs found *    Findings: SAME.      Electronically signed by Dania Carranza MD on 3/1/2024 at 2:01 PM

## 2024-03-01 NOTE — CONSULTS
[facility] Orthopedic Surgery  Consult Note         This patient is seen in consultation at the request of Benja Gonzales DO     Reason for Consult:  Right ankle fracture dislocation.    CHIEF COMPLAINT:  Right ankle pain/ fall    History Obtained From:  patient, spouse, electronic medical record    HISTORY OF PRESENT ILLNESS:    Mr. Garcia 89 y.o.  male presents today for consultation and evaluation of a right ankle injury which occurred when he was walking and tripped.  He was first seen and evaluated in [facility], where he was x-rayed, splinted and I was consulted. He is complaining of medial & lateral ankle pain and swelling 8/10. This is better with elevation and worse with bearing any wt. The pain is sharp and not radiating. No numbness or tingling sensation. Alleviating factors: rest. No other complaint.     Past Medical History:        Diagnosis Date    Aortic regurgitation     Atrial fibrillation (HCC)     BPH     Hyperlipidemia     Hypertension     Superficial phlebitis     Ulcerative colitis     Urinary retention        Past Surgical History:        Procedure Laterality Date    COLONOSCOPY  10/2011    COLONOSCOPY  09/20/2013    COLONOSCOPY  10/16/2015    COLONOSCOPY N/A 10/11/2019    COLONOSCOPY CONTROL HEMORRHAGE performed by Davion Tolbert MD at [facility] ENDOSCOPY    COLONOSCOPY  10/2/2020    COLONOSCOPY WITH BIOPSY performed by Davion Tolbert MD at [facility] ENDOSCOPY    COLONOSCOPY  10/2/2020    COLONOSCOPY POLYPECTOMY SNARE/COLD BIOPSY performed by Davion Tolbert MD at [facility] ENDOSCOPY    HERNIA REPAIR      inguinal    MOHS SURGERY  07/25/2018    Left lower lip, left cheek, right cheek    MOHS SURGERY N/A 7/6/2020    FULL THICKNESS SKIN GRAFT TO THE NOSE (2.5 X 2 CM), ROTATIONAL ADVANCEMENT FLAP (3X4.2 CM) TO THE LIP (3X2 CM) performed by Victoriano Felix MD at [facility] OR    MOHS SURGERY      multiple times    PARATHYROIDECTOMY      SALIVARY GLAND SURGERY      parotid       Medications prior to  discussed an expected post-operative course.  He  is understanding of this and wishes to proceed.     Thank you very much for the kind consultation and allowing me to participate in this patient's care.  I will continue to keep you apprised of his progress.        Dania Carranza MD   3/1/2024  12:16 PM

## 2024-03-01 NOTE — ANESTHESIA POSTPROCEDURE EVALUATION
Department of Anesthesiology  Postprocedure Note    Patient: Edmundo Garcia  MRN: [id number]  YOB: 1934  Date of evaluation: 3/1/2024    Procedure Summary       Date: 03/01/24 Room / Location: [address]    Anesthesia Start: 1234 Anesthesia Stop: 1400    Procedure: ANKLE OPEN REDUCTION INTERNAL FIXATION (Right: Ankle) Diagnosis:       Closed fracture of right ankle, initial encounter      (Closed fracture of right ankle, initial encounter [S82.891A])    Surgeons: Dania Carranza MD Responsible Provider: Edmunod Yoder MD    Anesthesia Type: general ASA Status: 3            Anesthesia Type: No value filed.    Filipe Phase I: Filipe Score: 10    Filipe Phase II:      Anesthesia Post Evaluation    Patient location during evaluation: PACU  Patient participation: complete - patient participated  Level of consciousness: awake and alert  Pain score: 2  Airway patency: patent  Nausea & Vomiting: no nausea and no vomiting  Cardiovascular status: blood pressure returned to baseline  Respiratory status: acceptable  Hydration status: euvolemic  Pain management: adequate    No notable events documented.

## 2024-03-01 NOTE — PROGRESS NOTES
Pt arrived from OR to PACU bay 5. Reported received from bjorn MAXWELL/yudi HAIR. Pt  arousable to voice. Surgical dressing in place to RLE clean and intact. Pt on RA, sinus lucia, and VSS. Will continue to monitor.

## 2024-03-01 NOTE — PROGRESS NOTES
Patient arrived to [facility] for procedure.  Alert and oriented but slightly confused regarding history and medications.  VSS, Afib noted on monitor.  Wife at bedside.  Ace wrap in place on right leg.

## 2024-03-01 NOTE — DISCHARGE INSTRUCTIONS
Post op instruction:  1- D/C home  2- Dx Right ankle fracture dislocation with syndesmosis disruption.  3- TTWB right ankle for transfer  4- Elevation surgical site, with ice  5- Keep splint dry and clean  6- F/U in 2 weeks.  7- For DVT prophylaxis- restart Eliquis tomorrow morning.     Dania Carranza MD, 3/1/2024      [place] AMBULAT[facility] PROCEDURE DISCHARGE INSTRUCTIONS    There are potential side effects of anesthesia or sedation you may experience for the first 24 hours.  These side effects include:    Confusion or Memory loss, Dizziness, or Delayed Reaction Times   [x]A responsible person should be with you for the next 24 hours.  Do not operate any vehicles (automobiles, bicycles, motorcycles) or power tools or machinery for 24 hours.  Do not sign any legal documents or make any legal decisions for 24 hours. Do not drink alcohol for 24 hours or while taking narcotic pain medication.      Nausea    [x]Start with light diet and progress to your normal diet as you feel like eating. However, if you experience nausea or repeated episodes of vomiting which persist beyond 12-24 hours, notify your physician.  Once nausea has passed, remember to keep drinking fluids.    Difficulty Passing Urine  [x]Drink extra amounts of fluid today.  Notify your physician if you have not urinated within 8 hours after your procedure or you feel uncomfortable.      Irritated Throat from a Breathing Tube  [x]Drink extra amounts of fluid today.  Lozenges may help.    Muscle Aches  [x]You may experience some generalized body aches as your muscles recover from medications used to relax them during surgery.  These will gradually subside.    MEDICATION INSTRUCTIONS:  []Prescription(S) x     sent with you.  Use as directed.  When taking pain medications, you may experience the side effect of dizziness or drowsiness.  Do not drink alcohol or drive when taking these medications.  [x]Prescription(S) x 1 Called to Pharmacy    [x]Give the list of

## 2024-03-01 NOTE — H&P
Preoperative H&P Update    The patient's History and Physical in the medical record from 3/1/2024 was reviewed by me today.    Past Medical History:   Diagnosis Date    Aortic regurgitation     Atrial fibrillation (HCC)     BPH     Hyperlipidemia     Hypertension     Superficial phlebitis     Ulcerative colitis     Urinary retention      Past Surgical History:   Procedure Laterality Date    COLONOSCOPY  10/2011    COLONOSCOPY  09/20/2013    COLONOSCOPY  10/16/2015    COLONOSCOPY N/A 10/11/2019    COLONOSCOPY CONTROL HEMORRHAGE performed by Davion Tolbert MD at [facility] ENDOSCOPY    COLONOSCOPY  10/2/2020    COLONOSCOPY WITH BIOPSY performed by Davion Tolbert MD at [facility] ENDOSCOPY    COLONOSCOPY  10/2/2020    COLONOSCOPY POLYPECTOMY SNARE/COLD BIOPSY performed by Davion Tolbert MD at [facility] ENDOSCOPY    HERNIA REPAIR      inguinal    MOHS SURGERY  07/25/2018    Left lower lip, left cheek, right cheek    MOHS SURGERY N/A 7/6/2020    FULL THICKNESS SKIN GRAFT TO THE NOSE (2.5 X 2 CM), ROTATIONAL ADVANCEMENT FLAP (3X4.2 CM) TO THE LIP (3X2 CM) performed by Victoriano Felix MD at [facility] OR    MOHS SURGERY      multiple times    PARATHYROIDECTOMY      SALIVARY GLAND SURGERY      parotid     No current facility-administered medications on file prior to encounter.     Current Outpatient Medications on File Prior to Encounter   Medication Sig Dispense Refill    HYDROcodone-acetaminophen (NORCO) 5-325 MG per tablet Take 1 tablet by mouth every 4 hours as needed for Pain for up to 3 days. Intended supply: 3 days. Take lowest dose possible to manage pain Max Daily Amount: 6 tablets 18 tablet 0    dapagliflozin (FARXIGA) 5 MG tablet Take 1 tablet by mouth every morning 90 tablet 1    furosemide (LASIX) 20 MG tablet Take 1 tablet by mouth daily 90 tablet 0    imiquimod (ALDARA) 5 % cream Apply topically three times a week Apply topically three times a week. (Patient not taking: Reported on 3/1/2024)      amiodarone (CORDARONE)

## 2024-03-01 NOTE — PROGRESS NOTES
Ambulatory Surgery/Procedure Discharge Note    Vitals:    03/01/24 1500   BP: (!) 150/78   Pulse: 63   Resp: 20   Temp: 97.8 °F (36.6 °C)   SpO2: 96%       In: 1000 [I.V.:850]  Out: 620 [Urine:600]    Restroom use offered before discharge.  Yes, voided 325cc of yellow urine    Pain assessment:  none  Pain Level: 0      Patient discharged to home. Patient discharged via wheel chair by RN to waiting family with personal belongings.       3/1/2024 3:24 PM

## 2024-03-01 NOTE — ANESTHESIA PRE PROCEDURE
[facility] Department of Anesthesiology  Pre-Anesthesia Evaluation/Consultation       Name:  Edmundo Garcia  : 1934  Age:  89 y.o.                                           MRN:  [id number]  Date: 3/1/2024           Surgeon: Surgeon(s):  Dania Carranza MD    Procedure: Procedure(s):  ANKLE OPEN REDUCTION INTERNAL FIXATION     Allergies   Allergen Reactions   • Iodine      **IVP DYES**     Patient Active Problem List   Diagnosis   • Benign non-nodular prostatic hyperplasia without lower urinary tract symptoms   • Aortic valve disorder   • Hyperlipidemia   • CAD (coronary artery disease)   • Ventral hernia   • Slow transit constipation   • Gross hematuria   • Basal cell carcinoma, face   • Sacral back pain   • Leg edema, left   • Permanent atrial fibrillation (HCC)   • Chronic renal disease, stage III (HCC) [105405]   • Right leg swelling   • Ulcerative pancolitis (HCC)   • Bilateral impacted cerumen   • Onychomycosis   • Chronic heart failure with preserved ejection fraction (HCC)   • Type 2 diabetes mellitus with stage 3a chronic kidney disease, without long-term current use of insulin (HCC)     Past Medical History:   Diagnosis Date   • Aortic regurgitation    • Atrial fibrillation (HCC)    • BPH    • Hyperlipidemia    • Hypertension    • Superficial phlebitis    • Ulcerative colitis    • Urinary retention      Past Surgical History:   Procedure Laterality Date   • COLONOSCOPY  10/2011   • COLONOSCOPY  2013   • COLONOSCOPY  10/16/2015   • COLONOSCOPY N/A 10/11/2019    COLONOSCOPY CONTROL HEMORRHAGE performed by Davion Tolbert MD at [facility] ENDOSCOPY   • COLONOSCOPY  10/2/2020    COLONOSCOPY WITH BIOPSY performed by Davion Tolbert MD at [facility] ENDOSCOPY   • COLONOSCOPY  10/2/2020    COLONOSCOPY POLYPECTOMY SNARE/COLD BIOPSY performed by Davion Tolbert MD at [facility] ENDOSCOPY   • HERNIA REPAIR      inguinal   • MOHS SURGERY  2018    Left lower lip, left cheek, right cheek   • MOHS SURGERY N/A

## 2024-03-01 NOTE — PROGRESS NOTES
Pt awake and alert at this time. Pt on RA, and VSS. Pt denies pain and nausea, tolerating PO.  Skin warm, and able to wiggle right toes. Pt meets criteria to be discharged from Phase 1.

## 2024-03-03 NOTE — OP NOTE
[facility]                            OPERATIVE REPORT      PATIENT NAME: SRIRAM GRAJEDA                 : 1934  MED REC NO: [id number]                      ROOM: OR  ACCOUNT NO: [id number]                       ADMIT DATE: 2024  PROVIDER: Dania Carranza MD      DATE OF PROCEDURE:  2024    SURGEON:  Dania Carranza MD    ASSISTANT:  TIMOTEO, surgical assistant.    PREOPERATIVE DIAGNOSES:    1. Right ankle fracture dislocation.  2. Right ankle distal tibia-fibular syndesmosis disruption.    POSTOPERATIVE DIAGNOSES:    1. Right ankle fracture dislocation.  2. Right ankle distal tibia-fibular syndesmosis disruption.    PROCEDURES DONE:    1. Open treatment of right ankle fracture dislocation with open reduction and internal fixation.  2. Open treatment of right ankle distal tibia-fibular syndesmosis disruption with 2 syndesmosis screws.    ANESTHESIA:  General anesthesia.    ESTIMATED BLOOD LOSS:  Minimal.    COMPLICATIONS:  None.    TOURNIQUET:  Right upper thigh 350 mmHg.    IMPLANTS USED:    1. Jaime distal fibula 5-hole locking plate.  2. Jaime 3.5 cortical screw x2 for syndesmosis repair.    INDICATIONS:  This is an 89-year-old white male, still fairly active for his age, was walking at home and tripped and fell sustaining a twisting injury to his right ankle.  He sustained a right ankle fracture dislocation.  He reduced it himself and then presented to [facility].  I was consulted for this injury.  All risks, benefits, and alternatives discussed with the patient including an operative treatment and he elected to proceed with surgical fixation.    Given the patient's Body mass index is 30.49 kg/m². added significant challenge to the procedure. It required significant physical and mental effort. It required 60% more time for such procedure.     DESCRIPTION OF PROCEDURE:  The patient's right ankle was marked.  He received 2 g Ancef IV

## 2024-03-14 ENCOUNTER — OFFICE VISIT (OUTPATIENT)
Dept: ORTHOPEDIC SURGERY | Age: 89
End: 2024-03-14

## 2024-03-14 VITALS — BODY MASS INDEX: 30.43 KG/M2 | HEIGHT: 70 IN | WEIGHT: 212.52 LBS

## 2024-03-14 DIAGNOSIS — S82.891D CLOSED FRACTURE OF RIGHT ANKLE WITH ROUTINE HEALING, SUBSEQUENT ENCOUNTER: ICD-10-CM

## 2024-03-14 DIAGNOSIS — M25.571 ACUTE RIGHT ANKLE PAIN: Primary | ICD-10-CM

## 2024-03-14 PROCEDURE — APPNB15 APP NON BILLABLE TIME 0-15 MINS: Performed by: NURSE PRACTITIONER

## 2024-03-14 NOTE — PROGRESS NOTES
rigid orthosis to improve their function.  The orthosis will assist in protecting the affected area, provide functional support and facilitate healing.    Patient was instructed to progress ambulation weight bearing as tolerated in the device.     The patient was educated and fit by a healthcare professional with expert knowledge and specialization in brace application while under the direct supervision of the physician.  Verbal and written instructions for the use of and application of this item were provided.   They were instructed to contact the office immediately should the brace result in increased pain, decreased sensation, increased swelling or worsening of the condition.       Valeria Wood, APRN - CNP

## 2024-03-18 ENCOUNTER — TELEPHONE (OUTPATIENT)
Dept: ORTHOPEDIC SURGERY | Age: 89
End: 2024-03-18

## 2024-03-18 NOTE — TELEPHONE ENCOUNTER
General Question     Subject: REQUESTING A CALL REGARDING HIS BOOT.  Patient and /or Facility Request: Edmundo Garcia \"FRANCIA\"   Contact Number: [phone]

## 2024-03-18 NOTE — TELEPHONE ENCOUNTER
Spoke with patient. He stated that the air bladder in the boot isn't working and wanted to be sure that it wouldn't be an issue. I assured him that the air bladder is not needed unless he is not able to get the straps tight enough. He stated the boot feels good and he is able to get the straps tight enough to feel supportive.

## 2024-04-25 ENCOUNTER — OFFICE VISIT (OUTPATIENT)
Dept: ORTHOPEDIC SURGERY | Age: 89
End: 2024-04-25

## 2024-04-25 VITALS — WEIGHT: 212 LBS | HEIGHT: 70 IN | BODY MASS INDEX: 30.35 KG/M2

## 2024-04-25 DIAGNOSIS — S93.431A SYNDESMOTIC DISRUPTION OF RIGHT ANKLE, INITIAL ENCOUNTER: ICD-10-CM

## 2024-04-25 DIAGNOSIS — S82.891D CLOSED FRACTURE OF RIGHT ANKLE WITH ROUTINE HEALING, SUBSEQUENT ENCOUNTER: Primary | ICD-10-CM

## 2024-04-25 PROCEDURE — 99024 POSTOP FOLLOW-UP VISIT: CPT | Performed by: NURSE PRACTITIONER

## 2024-04-25 PROCEDURE — APPNB15 APP NON BILLABLE TIME 0-15 MINS: Performed by: NURSE PRACTITIONER

## 2024-04-26 NOTE — PROGRESS NOTES
DIAGNOSIS:    1-Right ankle fracture dislocation, status post ORIF.  2-Right ankle distal tibia fibular syndesmosis disruption, status post ORIF syndesmosis screws x 2    DATE OF SURGERY:  3/1/2024.    HISTORY OF PRESENT ILLNESS:  Mr. Garcia 89 y.o.  male who came in today for 8 weeks postoperative visit.  The patient denies any significant pain in the right ankle. Rates pain a 0/10 VAS. He has been in a boot, and WB.  He lives with his wife.  No numbness or tingling sensation. No fever or Chills. Denies smoking. He is an avid golfer.     PHYSICAL EXAMINATION:  The incision healing well. There are skin lesions with thick scaly skin BLE. No signs of infection.  No signs of any erythema or drainage, moderate swelling. He has no pain with the active or passive range of motion of the right ankle, but decrease ROM.  He has intact sensation distally, and he is neurovascularly intact.     IMAGING:  Three views right ankle taken today in the office showed anatomic alignment of the fracture, plate and screws in good position, no loosening. Ankle mortise is well centered.  Syndesmosis screws intact x 2. There is hardware intact in the left ankle.     IMPRESSION:    1-Right ankle fracture dislocation, status post ORIF.  2-Right ankle distal tibia fibular syndesmosis disruption, status post ORIF syndesmosis screws x 2      PLAN: He will be WBAT in the boot, and start aggressive ROM and peroneal strengthening exercise. Off the boot this week.No heavy impact activities. The patient will come back for a follow up in 6 weeks.  At that time, we will take 3 views of the right ankle standing. We will most likely not remove the syndesmosis screws        Valeria Wood, APRN - CNP

## 2024-05-15 ENCOUNTER — OFFICE VISIT (OUTPATIENT)
Dept: FAMILY MEDICINE CLINIC | Age: 89
End: 2024-05-15
Payer: MEDICARE

## 2024-05-15 VITALS
TEMPERATURE: 96.9 F | WEIGHT: 212 LBS | SYSTOLIC BLOOD PRESSURE: 138 MMHG | DIASTOLIC BLOOD PRESSURE: 74 MMHG | BODY MASS INDEX: 30.42 KG/M2 | HEART RATE: 54 BPM | OXYGEN SATURATION: 97 %

## 2024-05-15 DIAGNOSIS — I48.21 PERMANENT ATRIAL FIBRILLATION (HCC): ICD-10-CM

## 2024-05-15 DIAGNOSIS — I50.32 CHRONIC HEART FAILURE WITH PRESERVED EJECTION FRACTION (HCC): ICD-10-CM

## 2024-05-15 DIAGNOSIS — K51.00 ULCERATIVE PANCOLITIS (HCC): Primary | ICD-10-CM

## 2024-05-15 DIAGNOSIS — N18.32 STAGE 3B CHRONIC KIDNEY DISEASE (HCC): ICD-10-CM

## 2024-05-15 PROBLEM — E11.22 TYPE 2 DIABETES MELLITUS WITH STAGE 3A CHRONIC KIDNEY DISEASE, WITHOUT LONG-TERM CURRENT USE OF INSULIN (HCC): Status: RESOLVED | Noted: 2023-11-16 | Resolved: 2024-05-15

## 2024-05-15 PROBLEM — N18.31 TYPE 2 DIABETES MELLITUS WITH STAGE 3A CHRONIC KIDNEY DISEASE, WITHOUT LONG-TERM CURRENT USE OF INSULIN (HCC): Status: RESOLVED | Noted: 2023-11-16 | Resolved: 2024-05-15

## 2024-05-15 PROCEDURE — G2211 COMPLEX E/M VISIT ADD ON: HCPCS | Performed by: NURSE PRACTITIONER

## 2024-05-15 PROCEDURE — 4004F PT TOBACCO SCREEN RCVD TLK: CPT | Performed by: NURSE PRACTITIONER

## 2024-05-15 PROCEDURE — 1123F ACP DISCUSS/DSCN MKR DOCD: CPT | Performed by: NURSE PRACTITIONER

## 2024-05-15 PROCEDURE — G8417 CALC BMI ABV UP PARAM F/U: HCPCS | Performed by: NURSE PRACTITIONER

## 2024-05-15 PROCEDURE — G8427 DOCREV CUR MEDS BY ELIG CLIN: HCPCS | Performed by: NURSE PRACTITIONER

## 2024-05-15 PROCEDURE — 99214 OFFICE O/P EST MOD 30 MIN: CPT | Performed by: NURSE PRACTITIONER

## 2024-05-15 SDOH — ECONOMIC STABILITY: FOOD INSECURITY: WITHIN THE PAST 12 MONTHS, THE FOOD YOU BOUGHT JUST DIDN'T LAST AND YOU DIDN'T HAVE MONEY TO GET MORE.: NEVER TRUE

## 2024-05-15 SDOH — ECONOMIC STABILITY: INCOME INSECURITY: HOW HARD IS IT FOR YOU TO PAY FOR THE VERY BASICS LIKE FOOD, HOUSING, MEDICAL CARE, AND HEATING?: NOT HARD AT ALL

## 2024-05-15 SDOH — ECONOMIC STABILITY: FOOD INSECURITY: WITHIN THE PAST 12 MONTHS, YOU WORRIED THAT YOUR FOOD WOULD RUN OUT BEFORE YOU GOT MONEY TO BUY MORE.: NEVER TRUE

## 2024-05-15 ASSESSMENT — ENCOUNTER SYMPTOMS
DIARRHEA: 0
SHORTNESS OF BREATH: 0
BLOOD IN STOOL: 0
COUGH: 0
EYE ITCHING: 0
COLOR CHANGE: 0
VOMITING: 0
WHEEZING: 0
EYE REDNESS: 0
BACK PAIN: 0
CONSTIPATION: 0
SINUS PRESSURE: 0
NAUSEA: 0

## 2024-05-15 NOTE — PROGRESS NOTES
Edmundo Garcia (:  1934) is a 89 y.o. male,Established patient, here for evaluation of the following chief complaint(s):  3 Month Follow-Up      ASSESSMENT/PLAN:  1. Ulcerative pancolitis (HCC)  Assessment & Plan:   Monitored by specialist- no acute findings meriting change in the plan  2. Permanent atrial fibrillation (HCC)  Assessment & Plan:  This is a chronic problem but is stable and controlled.  Continue amiodarone 200 mg daily and Eliquis 5 mg twice daily.  Patient is experiencing benefit on medication on minimal dose without significant side effects.  No change to current treatment plan.  Follow-up in 3 months.  3. Stage 3b chronic kidney disease (HCC)  Assessment & Plan:   This is a chronic problem that is not stable controlled.  At this time continue Farxiga 5 mg daily. Last GFR was 44.  Patient is tolerating medication at current dose without any side effects and experiencing benefit.  No changes to current treatment plan continue to follow-up every 3 months or as needed  4. Chronic heart failure with preserved ejection fraction (HCC)  Assessment & Plan:   Is a chronic condition that is not stable and controlled.  He continues to follow with cardiology.  At this time continue Farxiga 5 mg daily, Lasix 20 mg daily, carvedilol 12.5 mg twice daily.  Patient is experiencing benefit at minimal dose without significant side effects continue current treatment plan without changes.      No follow-ups on file.    SUBJECTIVE/OBJECTIVE:  Patient the office today for his 3-month follow-up to discuss chronic medical conditions.  Patient has history of congestive heart failure, chronic kidney disease, symptomatic BPH.  Patient has been taking his medications as prescribed.  He has been taking his Farxiga 5 mg daily, Lasix 20 mg daily, amiodarone 200 mg daily, carvedilol 12-1/2 mg daily 2 times and his blood pressure and heart rate have been well-controlled.  He is on Eliquis 5 mg twice a day for atrial

## 2024-05-15 NOTE — ASSESSMENT & PLAN NOTE
Is a chronic condition that is not stable and controlled.  He continues to follow with cardiology.  At this time continue Farxiga 5 mg daily, Lasix 20 mg daily, carvedilol 12.5 mg twice daily.  Patient is experiencing benefit at minimal dose without significant side effects continue current treatment plan without changes.

## 2024-05-15 NOTE — ASSESSMENT & PLAN NOTE
This is a chronic problem but is stable and controlled.  Continue amiodarone 200 mg daily and Eliquis 5 mg twice daily.  Patient is experiencing benefit on medication on minimal dose without significant side effects.  No change to current treatment plan.  Follow-up in 3 months.

## 2024-05-15 NOTE — ASSESSMENT & PLAN NOTE
This is a chronic problem that is not stable controlled.  At this time continue Farxiga 5 mg daily. Last GFR was 44.  Patient is tolerating medication at current dose without any side effects and experiencing benefit.  No changes to current treatment plan continue to follow-up every 3 months or as needed   Vitals stable. Q3 NG feeds with EBM. Tolerated with no issues. Sternal dressing changed. Mother at bedside.safety maintained

## 2024-06-04 ENCOUNTER — OFFICE VISIT (OUTPATIENT)
Dept: ORTHOPEDIC SURGERY | Age: 89
End: 2024-06-04
Payer: MEDICARE

## 2024-06-04 VITALS — WEIGHT: 212 LBS | BODY MASS INDEX: 30.35 KG/M2 | HEIGHT: 70 IN

## 2024-06-04 DIAGNOSIS — S93.431D SYNDESMOTIC DISRUPTION OF RIGHT ANKLE, SUBSEQUENT ENCOUNTER: ICD-10-CM

## 2024-06-04 DIAGNOSIS — S82.891D CLOSED FRACTURE OF RIGHT ANKLE WITH ROUTINE HEALING, SUBSEQUENT ENCOUNTER: Primary | ICD-10-CM

## 2024-06-04 PROCEDURE — G8417 CALC BMI ABV UP PARAM F/U: HCPCS | Performed by: NURSE PRACTITIONER

## 2024-06-04 PROCEDURE — 99213 OFFICE O/P EST LOW 20 MIN: CPT | Performed by: NURSE PRACTITIONER

## 2024-06-04 PROCEDURE — G8427 DOCREV CUR MEDS BY ELIG CLIN: HCPCS | Performed by: NURSE PRACTITIONER

## 2024-06-04 PROCEDURE — 1123F ACP DISCUSS/DSCN MKR DOCD: CPT | Performed by: NURSE PRACTITIONER

## 2024-06-04 PROCEDURE — 4004F PT TOBACCO SCREEN RCVD TLK: CPT | Performed by: NURSE PRACTITIONER

## 2024-06-04 NOTE — PROGRESS NOTES
shut down'    Kidney Disease Father      Social History     Socioeconomic History    Marital status:      Spouse name: Not on file    Number of children: 3    Years of education: Not on file    Highest education level: Not on file   Occupational History    Occupation: sold [employer]     Comment: retired   Tobacco Use    Smoking status: Some Days     Types: Cigars    Smokeless tobacco: Never   Vaping Use    Vaping Use: Never used   Substance and Sexual Activity    Alcohol use: Yes     Alcohol/week: 0.0 standard drinks of alcohol     Comment: socially    Drug use: No    Sexual activity: Yes     Partners: Female   Other Topics Concern    Not on file   Social History Narrative    Not on file     Social Determinants of Health     Financial Resource Strain: Low Risk  (5/15/2024)    Overall Financial Resource Strain (CARDIA)     Difficulty of Paying Living Expenses: Not hard at all   Food Insecurity: No Food Insecurity (5/15/2024)    Hunger Vital Sign     Worried About Running Out of Food in the Last Year: Never true     Ran Out of Food in the Last Year: Never true   Transportation Needs: Unknown (5/15/2024)    PRAPARE - Transportation     Lack of Transportation (Medical): Not on file     Lack of Transportation (Non-Medical): No   Physical Activity: Inactive (8/9/2023)    Exercise Vital Sign     Days of Exercise per Week: 0 days     Minutes of Exercise per Session: 0 min   Stress: Not on file   Social Connections: Not on file   Intimate Partner Violence: Not on file   Housing Stability: Unknown (5/15/2024)    Housing Stability Vital Sign     Unable to Pay for Housing in the Last Year: Not on file     Number of Places Lived in the Last Year: Not on file     Unstable Housing in the Last Year: No     Current Outpatient Medications   Medication Sig Dispense Refill    dapagliflozin (FARXIGA) 5 MG tablet Take 1 tablet by mouth every morning 90 tablet 1    furosemide (LASIX) 20 MG tablet Take 1 tablet by mouth daily

## 2024-06-17 ENCOUNTER — TELEPHONE (OUTPATIENT)
Dept: SURGERY | Age: 89
End: 2024-06-17

## 2024-06-17 NOTE — TELEPHONE ENCOUNTER
Called the patient to discuss biopsies from October 2023. 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 He is doing gentle care treatment and observing the two sites we biopsied. I let him know that if these become problematic/bleeding/growing/painful to call our office otherwise he will be followed with . Patient verbalized understanding and was in agreement with this. No further questions.

## 2024-06-18 NOTE — TELEPHONE ENCOUNTER
At previous in person appointments with Alden, he made it clear that given his extensive skin cancer history, his age and other comorbidities, he does not wish to pursue treatment of all of his skin cancers.  He wanted to pursue tx of only those that were symptomatic and even those to do the least invasive tx possible to keep sx at bay.  He is aware that he has two bx proven NMSC and he also has  numerous clinically evident NMSCs.  He is followed by Dr. Haynes for skin exams and knows he can schedule with me if he wishes to pursue surgery for any of these.

## 2024-08-06 ENCOUNTER — OFFICE VISIT (OUTPATIENT)
Dept: ORTHOPEDIC SURGERY | Age: 89
End: 2024-08-06
Payer: MEDICARE

## 2024-08-06 VITALS — WEIGHT: 212 LBS | HEIGHT: 70 IN | BODY MASS INDEX: 30.35 KG/M2

## 2024-08-06 DIAGNOSIS — S82.891D CLOSED FRACTURE OF RIGHT ANKLE WITH ROUTINE HEALING, SUBSEQUENT ENCOUNTER: Primary | ICD-10-CM

## 2024-08-06 DIAGNOSIS — S93.431D SYNDESMOTIC DISRUPTION OF RIGHT ANKLE, SUBSEQUENT ENCOUNTER: ICD-10-CM

## 2024-08-06 PROCEDURE — 4004F PT TOBACCO SCREEN RCVD TLK: CPT | Performed by: NURSE PRACTITIONER

## 2024-08-06 PROCEDURE — G8417 CALC BMI ABV UP PARAM F/U: HCPCS | Performed by: NURSE PRACTITIONER

## 2024-08-06 PROCEDURE — 1123F ACP DISCUSS/DSCN MKR DOCD: CPT | Performed by: NURSE PRACTITIONER

## 2024-08-06 PROCEDURE — 99213 OFFICE O/P EST LOW 20 MIN: CPT | Performed by: NURSE PRACTITIONER

## 2024-08-06 PROCEDURE — G8427 DOCREV CUR MEDS BY ELIG CLIN: HCPCS | Performed by: NURSE PRACTITIONER

## 2024-08-06 NOTE — PROGRESS NOTES
DIAGNOSIS:    1-Right ankle fracture dislocation, status post ORIF.  2-Right ankle distal tibia fibular syndesmosis disruption, status post ORIF syndesmosis screws x 2    DATE OF SURGERY:  3/1/2024.    HISTORY OF PRESENT ILLNESS:  Mr. Garcia 89 y.o.  male who came in today for 5 months postoperative visit.  The patient denies any significant pain in the right ankle. Rates pain a 0/10 VAS. He has been  WB using a cane.  He lives with his wife.  No numbness or tingling sensation. No fever or Chills. Denies smoking. He is an avid golfer. He has CHF and has seen his cardiologist for increased right leg swelling. He has been using compression hose.     Past Medical History:   Diagnosis Date    Aortic regurgitation     Atrial fibrillation (HCC)     BPH     Hyperlipidemia     Hypertension     Superficial phlebitis     Ulcerative colitis     Urinary retention      Past Surgical History:   Procedure Laterality Date    ANKLE FRACTURE SURGERY Right 3/1/2024    ANKLE OPEN REDUCTION INTERNAL FIXATION performed by Dania Carranza MD at [facility] OR    COLONOSCOPY  10/2011    COLONOSCOPY  09/20/2013    COLONOSCOPY  10/16/2015    COLONOSCOPY N/A 10/11/2019    COLONOSCOPY CONTROL HEMORRHAGE performed by Davion Tolbert MD at [facility] ENDOSCOPY    COLONOSCOPY  10/2/2020    COLONOSCOPY WITH BIOPSY performed by Davion Tolbert MD at [facility] ENDOSCOPY    COLONOSCOPY  10/2/2020    COLONOSCOPY POLYPECTOMY SNARE/COLD BIOPSY performed by Davion Tolbert MD at [facility] ENDOSCOPY    HERNIA REPAIR      inguinal    MOHS SURGERY  07/25/2018    Left lower lip, left cheek, right cheek    MOHS SURGERY N/A 7/6/2020    FULL THICKNESS SKIN GRAFT TO THE NOSE (2.5 X 2 CM), ROTATIONAL ADVANCEMENT FLAP (3X4.2 CM) TO THE LIP (3X2 CM) performed by Victoriano Felix MD at [facility] OR    MOHS SURGERY      multiple times    PARATHYROIDECTOMY      SALIVARY GLAND SURGERY      parotid     Family History   Problem Relation Age of Onset    Other Mother         'intestine

## 2024-08-14 ENCOUNTER — OFFICE VISIT (OUTPATIENT)
Dept: FAMILY MEDICINE CLINIC | Age: 89
End: 2024-08-14
Payer: MEDICARE

## 2024-08-14 VITALS
HEART RATE: 68 BPM | RESPIRATION RATE: 18 BRPM | SYSTOLIC BLOOD PRESSURE: 136 MMHG | DIASTOLIC BLOOD PRESSURE: 70 MMHG | OXYGEN SATURATION: 98 % | TEMPERATURE: 97.8 F | BODY MASS INDEX: 29.56 KG/M2 | WEIGHT: 206 LBS

## 2024-08-14 DIAGNOSIS — I48.21 PERMANENT ATRIAL FIBRILLATION (HCC): ICD-10-CM

## 2024-08-14 DIAGNOSIS — I50.32 CHRONIC HEART FAILURE WITH PRESERVED EJECTION FRACTION (HCC): ICD-10-CM

## 2024-08-14 DIAGNOSIS — E11.65 TYPE 2 DIABETES MELLITUS WITH HYPERGLYCEMIA, WITHOUT LONG-TERM CURRENT USE OF INSULIN (HCC): ICD-10-CM

## 2024-08-14 DIAGNOSIS — I50.22 CHRONIC SYSTOLIC (CONGESTIVE) HEART FAILURE (HCC): ICD-10-CM

## 2024-08-14 DIAGNOSIS — E03.9 ACQUIRED HYPOTHYROIDISM: ICD-10-CM

## 2024-08-14 DIAGNOSIS — N18.32 STAGE 3B CHRONIC KIDNEY DISEASE (HCC): Primary | ICD-10-CM

## 2024-08-14 PROBLEM — K51.00 ULCERATIVE PANCOLITIS (HCC): Status: RESOLVED | Noted: 2023-05-04 | Resolved: 2024-08-14

## 2024-08-14 LAB
ALBUMIN: 4 G/DL (ref 3.5–5.7)
ALP BLD-CCNC: 70 IU/L (ref 35–135)
ALT SERPL-CCNC: 10 IU/L (ref 10–60)
ANION GAP SERPL CALCULATED.3IONS-SCNC: 7 MMOL/L (ref 4–16)
AST SERPL-CCNC: 16 IU/L (ref 10–40)
B-TYPE NATRIURETIC PEPTIDE: 422 PG/ML (ref 0–77)
BILIRUB SERPL-MCNC: 1.4 MG/DL (ref 0–1.2)
BUN BLDV-MCNC: 26 MG/DL (ref 8–26)
CALCIUM SERPL-MCNC: 8.4 MG/DL (ref 8.5–10.4)
CHLORIDE BLD-SCNC: 104 MEQ/L (ref 98–111)
CO2: 27 MMOL/L (ref 21–31)
CREAT SERPL-MCNC: 1.44 MG/DL (ref 0.7–1.3)
EGFR (CKD-EPI): 46 ML/MIN/1.73 M2
ESTIMATED AVERAGE GLUCOSE: 120 MG/DL
GLUCOSE BLD-MCNC: 83 MG/DL (ref 70–99)
HBA1C MFR BLD: 5.8 % (ref 4.2–5.6)
HCT VFR BLD CALC: 37.4 % (ref 40–50)
HEMOGLOBIN: 12.4 G/DL (ref 13.5–16.5)
MCH RBC QN AUTO: 28.9 PG (ref 27–33)
MCHC RBC AUTO-ENTMCNC: 33.1 G/DL (ref 32–36)
MCV RBC AUTO: 87.4 FL (ref 82–97)
PDW BLD-RTO: 20.7 % (ref 12.3–17)
PLATELET # BLD: 187 THOU/MCL (ref 140–375)
PMV BLD AUTO: 8.8 FL (ref 7.4–11.5)
POTASSIUM SERPL-SCNC: 5.2 MEQ/L (ref 3.6–5.1)
RBC # BLD: 4.28 MIL/MCL (ref 4.4–5.8)
SODIUM BLD-SCNC: 138 MEQ/L (ref 135–145)
T4 FREE: 0.71 NG/DL (ref 0.8–1.8)
TOTAL PROTEIN: 6.5 G/DL (ref 6–8)
TSH ULTRASENSITIVE: 57.4 MCIU/ML (ref 0.27–4.2)
WBC # BLD: 4.9 THOU/MCL (ref 3.6–10.5)

## 2024-08-14 PROCEDURE — 4004F PT TOBACCO SCREEN RCVD TLK: CPT | Performed by: NURSE PRACTITIONER

## 2024-08-14 PROCEDURE — 1123F ACP DISCUSS/DSCN MKR DOCD: CPT | Performed by: NURSE PRACTITIONER

## 2024-08-14 PROCEDURE — 99215 OFFICE O/P EST HI 40 MIN: CPT | Performed by: NURSE PRACTITIONER

## 2024-08-14 PROCEDURE — G8427 DOCREV CUR MEDS BY ELIG CLIN: HCPCS | Performed by: NURSE PRACTITIONER

## 2024-08-14 PROCEDURE — G8417 CALC BMI ABV UP PARAM F/U: HCPCS | Performed by: NURSE PRACTITIONER

## 2024-08-14 PROCEDURE — G2211 COMPLEX E/M VISIT ADD ON: HCPCS | Performed by: NURSE PRACTITIONER

## 2024-08-14 RX ORDER — FUROSEMIDE 20 MG/1
20 TABLET ORAL DAILY
Qty: 90 TABLET | Refills: 0 | Status: CANCELLED | OUTPATIENT
Start: 2024-08-14

## 2024-08-14 RX ORDER — SPIRONOLACTONE 25 MG/1
25 TABLET ORAL DAILY
COMMUNITY
Start: 2024-07-09

## 2024-08-14 RX ORDER — SACUBITRIL AND VALSARTAN 24; 26 MG/1; MG/1
1 TABLET, FILM COATED ORAL 2 TIMES DAILY
COMMUNITY

## 2024-08-14 RX ORDER — DAPAGLIFLOZIN 5 MG/1
5 TABLET, FILM COATED ORAL EVERY MORNING
Qty: 90 TABLET | Refills: 1 | Status: CANCELLED | OUTPATIENT
Start: 2024-08-14

## 2024-08-14 ASSESSMENT — ENCOUNTER SYMPTOMS
CHEST TIGHTNESS: 0
EYE REDNESS: 0
SORE THROAT: 0
SINUS PRESSURE: 0
DIARRHEA: 0
COLOR CHANGE: 0
RHINORRHEA: 0
ABDOMINAL PAIN: 0
NAUSEA: 0
BLOOD IN STOOL: 0
SHORTNESS OF BREATH: 0
BACK PAIN: 0
WHEEZING: 0
CONSTIPATION: 0
VOMITING: 0
COUGH: 0
EYE ITCHING: 0

## 2024-08-14 NOTE — PROGRESS NOTES
Edmundo Garcia (:  1934) is a 89 y.o. male,Established patient, here for evaluation of the following chief complaint(s):  3 Month Follow-Up      ASSESSMENT/PLAN:  1. Stage 3b chronic kidney disease (HCC)  Assessment & Plan:   Stable, controlled  No changes  Continue current treatment plan     Orders:  -     CBC; Future  -     Comprehensive Metabolic Panel; Future  -     TSH with Reflex; Future  -     Brain Natriuretic Peptide; Future  -     Hemoglobin A1C; Future  2. Chronic heart failure with preserved ejection fraction (HCC)  Assessment & Plan:   Stable, controlled  No changes  Continue current treatment plan   Ef 55%  Entresto, lasix, antoni  No sglt  Orders:  -     CBC; Future  -     Comprehensive Metabolic Panel; Future  -     TSH with Reflex; Future  -     Brain Natriuretic Peptide; Future  -     Hemoglobin A1C; Future  3. Type 2 diabetes mellitus with hyperglycemia, without long-term current use of insulin (HCC)  -     CBC; Future  -     Comprehensive Metabolic Panel; Future  -     TSH with Reflex; Future  -     Brain Natriuretic Peptide; Future  -     Hemoglobin A1C; Future  4. Acquired hypothyroidism  -     CBC; Future  -     Comprehensive Metabolic Panel; Future  -     TSH with Reflex; Future  -     Brain Natriuretic Peptide; Future  -     Hemoglobin A1C; Future  5. Chronic systolic (congestive) heart failure  6. Permanent atrial fibrillation (HCC)  Assessment & Plan:  Stable, controlled  No changes  Continue current treatment plan   Eliquis and amio      No follow-ups on file.    SUBJECTIVE/OBJECTIVE:  Pt presents for a f/u, he reports decreased balance and concern for falling, his intermittently uses a cane and a walker. He saw his cardiologiest about 6 weeks ago and was sent to the advanced heart failure clinic. He is taking entresto, but not on sglt. Lasix prn. Still with antoni.  Upon review of his labs and his recently completed a [facility] his TSH was significantly elevated at 55.5.    Current

## 2024-08-14 NOTE — ASSESSMENT & PLAN NOTE
Stable, controlled  No changes  Continue current treatment plan   Ef 55%  Entresto, lasix, antoni  No sglt

## 2024-08-15 DIAGNOSIS — E03.9 ACQUIRED HYPOTHYROIDISM: Primary | ICD-10-CM

## 2024-08-15 NOTE — PROGRESS NOTES
Spoke with BARTOLOME Rosa with [facility], agreement that if tsh is elevated, could be related to amio, will hold amio at this time and repeat labs in 4-6 weeks. Patient notified of the results.

## 2024-08-20 ENCOUNTER — TELEPHONE (OUTPATIENT)
Dept: FAMILY MEDICINE CLINIC | Age: 89
End: 2024-08-20

## 2024-09-26 DIAGNOSIS — E03.9 ACQUIRED HYPOTHYROIDISM: ICD-10-CM

## 2024-09-26 LAB
T4 FREE SERPL-MCNC: 0.5 NG/DL (ref 0.9–1.8)
TSH SERPL DL<=0.005 MIU/L-ACNC: 64.8 UIU/ML (ref 0.27–4.2)

## 2024-10-01 ENCOUNTER — OFFICE VISIT (OUTPATIENT)
Dept: FAMILY MEDICINE CLINIC | Age: 89
End: 2024-10-01

## 2024-10-01 VITALS
BODY MASS INDEX: 28.63 KG/M2 | HEART RATE: 58 BPM | DIASTOLIC BLOOD PRESSURE: 82 MMHG | HEIGHT: 70 IN | TEMPERATURE: 96.9 F | OXYGEN SATURATION: 98 % | SYSTOLIC BLOOD PRESSURE: 134 MMHG | WEIGHT: 200 LBS

## 2024-10-01 DIAGNOSIS — N18.31 STAGE 3A CHRONIC KIDNEY DISEASE (HCC): ICD-10-CM

## 2024-10-01 DIAGNOSIS — E03.9 ACQUIRED HYPOTHYROIDISM: Primary | ICD-10-CM

## 2024-10-01 DIAGNOSIS — I48.21 PERMANENT ATRIAL FIBRILLATION (HCC): ICD-10-CM

## 2024-10-01 DIAGNOSIS — Z23 IMMUNIZATION DUE: ICD-10-CM

## 2024-10-01 DIAGNOSIS — Z00.00 MEDICARE ANNUAL WELLNESS VISIT, SUBSEQUENT: ICD-10-CM

## 2024-10-01 DIAGNOSIS — I50.32 CHRONIC HEART FAILURE WITH PRESERVED EJECTION FRACTION (HCC): ICD-10-CM

## 2024-10-01 RX ORDER — LEVOTHYROXINE SODIUM 125 UG/1
125 TABLET ORAL DAILY
Qty: 30 TABLET | Refills: 5 | Status: SHIPPED | OUTPATIENT
Start: 2024-10-01 | End: 2024-10-01 | Stop reason: CLARIF

## 2024-10-01 RX ORDER — FUROSEMIDE 20 MG
20 TABLET ORAL 2 TIMES DAILY
COMMUNITY

## 2024-10-01 ASSESSMENT — PATIENT HEALTH QUESTIONNAIRE - PHQ9
SUM OF ALL RESPONSES TO PHQ9 QUESTIONS 1 & 2: 0
2. FEELING DOWN, DEPRESSED OR HOPELESS: NOT AT ALL
SUM OF ALL RESPONSES TO PHQ QUESTIONS 1-9: 0
SUM OF ALL RESPONSES TO PHQ QUESTIONS 1-9: 0
1. LITTLE INTEREST OR PLEASURE IN DOING THINGS: NOT AT ALL
SUM OF ALL RESPONSES TO PHQ QUESTIONS 1-9: 0
SUM OF ALL RESPONSES TO PHQ QUESTIONS 1-9: 0

## 2024-10-02 NOTE — ASSESSMENT & PLAN NOTE
Reviewed notes from cardio at [facility] [place]  No longer on amio  Stable, controlled  No changes  Continue current treatment plan

## 2024-10-02 NOTE — ASSESSMENT & PLAN NOTE
Stable, controlled  No changes  Continue current treatment plan   Ef 55%  Entresto, lasix, antoni  No sglt  Reviewed notes from [facility] cardiology in [place]

## 2024-10-02 NOTE — ASSESSMENT & PLAN NOTE
Stable, controlled  No changes  Continue current treatment plan   Lab Results   Component Value Date    CREATININE 1.44 (H) 08/14/2024    BUN 26 08/14/2024     08/14/2024    K 5.2 (H) 08/14/2024     08/14/2024    CO2 27 08/14/2024     Lab Results   Component Value Date     08/14/2024    K 5.2 (H) 08/14/2024     08/14/2024    CO2 27 08/14/2024    BUN 26 08/14/2024    CREATININE 1.44 (H) 08/14/2024    GLUCOSE 83 08/14/2024    CALCIUM 8.4 (L) 08/14/2024    BILITOT 1.4 (H) 08/14/2024    ALKPHOS 70 08/14/2024    AST 16 08/14/2024    ALT 10 08/14/2024    LABGLOM 44 (A) 11/13/2023    GFRAA >60 07/22/2022    AGRATIO 2.1 11/13/2023    GLOB 2.4 08/02/2023

## 2024-10-02 NOTE — PROGRESS NOTES
solution 1 drop 2 times daily Yes ProviderSelene MD   apixaban (ELIQUIS) 5 MG TABS tablet Take 1 tablet by mouth 2 times daily Yes Thanh Saldivar MD   Vitamin D (CHOLECALCIFEROL) 1000 UNITS CAPS capsule Take 1 capsule by mouth daily Yes Thanh Saldivar MD   sildenafil (VIAGRA) 100 MG tablet Take 1 tablet by mouth as needed for Erectile Dysfunction Yes Thanh Saldivar MD   simvastatin (ZOCOR) 20 MG tablet TAKE 1 TABLET IN THE EVENING Yes Thanh Saldivar MD   latanoprost (XALATAN) 0.005 % ophthalmic solution Place 1 drop into both eyes nightly Yes ProviderSelene MD   aspirin EC 81 MG EC tablet Take 1 tablet by mouth daily. Yes Thanh Saldivar MD   mercaptopurine (PURINETHOL) 50 MG tablet Take 1 tablet by mouth daily Yes ProviderSelene MD   tamsulosin (FLOMAX) 0.4 MG capsule Take 1 capsule by mouth daily for 5 doses  Michael Minor MD       [facility] (Including outside providers/suppliers regularly involved in providing care):   Patient Care Team:  Polly Alcala APRN - CNP as PCP - General (Nurse Practitioner)  Polly Alcala APRN - CNP as PCP - Empaneled Provider      Reviewed and updated this visit:  Allergies  Meds  Med Hx  Surg Hx  Soc Hx  Fam Hx

## 2024-10-02 NOTE — PATIENT INSTRUCTIONS
Learning About Being Active as an Older Adult  Why is being active important as you get older?     Being active is one of the best things you can do for your health. And it's never too late to start. Being active--or getting active, if you aren't already--has definite benefits. It can:  Give you more energy,  Keep your mind sharp.  Improve balance to reduce your risk of falls.  Help you manage chronic illness with fewer medicines.  No matter how old you are, how fit you are, or what health problems you have, there is a form of activity that will work for you. And the more physical activity you can do, the better your overall health will be.  What kinds of activity can help you stay healthy?  Being more active will make your daily activities easier. Physical activity includes planned exercise and things you do in daily life. There are four types of activity:  Aerobic.  Doing aerobic activity makes your heart and lungs strong.  Includes walking, dancing, and gardening.  Aim for at least 2½ hours spread throughout the week.  It improves your energy and can help you sleep better.  Muscle-strengthening.  This type of activity can help maintain muscle and strengthen bones.  Includes climbing stairs, using resistance bands, and lifting or carrying heavy loads.  Aim for at least twice a week.  It can help protect the knees and other joints.  Stretching.  Stretching gives you better range of motion in joints and muscles.  Includes upper arm stretches, calf stretches, and gentle yoga.  Aim for at least twice a week, preferably after your muscles are warmed up from other activities.  It can help you function better in daily life.  Balancing.  This helps you stay coordinated and have good posture.  Includes heel-to-toe walking, kailey chi, and certain types of yoga.  Aim for at least 3 days a week.  It can reduce your risk of falling.  Even if you have a hard time meeting the recommendations, it's better to be more active

## 2024-10-02 NOTE — ASSESSMENT & PLAN NOTE
New, uncertain prognosis, recheck labs   Initially thought related to amio--but this has been stopped  Check us  Treat as results indicate

## 2024-10-03 ENCOUNTER — HOSPITAL ENCOUNTER (OUTPATIENT)
Age: 89
Discharge: HOME | End: 2024-10-03
Payer: MEDICARE

## 2024-10-03 DIAGNOSIS — E03.9 ACQUIRED HYPOTHYROIDISM: ICD-10-CM

## 2024-10-03 PROCEDURE — 76536 US EXAM OF HEAD AND NECK: CPT

## 2024-10-04 DIAGNOSIS — E03.9 ACQUIRED HYPOTHYROIDISM: ICD-10-CM

## 2024-10-04 LAB
T4 FREE SERPL-MCNC: 0.4 NG/DL (ref 0.9–1.8)
TSH SERPL DL<=0.005 MIU/L-ACNC: 84.5 UIU/ML (ref 0.27–4.2)

## 2024-10-05 RX ORDER — LEVOTHYROXINE SODIUM 175 UG/1
175 TABLET ORAL DAILY
Qty: 90 TABLET | Refills: 1 | Status: SHIPPED | OUTPATIENT
Start: 2024-10-05

## 2024-10-08 ENCOUNTER — TELEPHONE (OUTPATIENT)
Dept: FAMILY MEDICINE CLINIC | Age: 89
End: 2024-10-08

## 2024-10-08 NOTE — TELEPHONE ENCOUNTER
Patient said to relay to Polly that he did received the refill request and he had the medicine refill already, thanks. BRIDGER

## 2024-12-03 ENCOUNTER — TELEPHONE (OUTPATIENT)
Dept: FAMILY MEDICINE CLINIC | Age: 88
End: 2024-12-03

## 2024-12-03 DIAGNOSIS — E03.9 ACQUIRED HYPOTHYROIDISM: Primary | ICD-10-CM

## 2024-12-03 NOTE — TELEPHONE ENCOUNTER
Patient says he was told by Polly that he should be re tested on 12/4 about a thyroid condition, however has not received anymore information concerning the test. He would like to be notified on what he needs to do in order to have his thyroid tested again. Please contact patient concerning this matter.    [phone] (home)

## 2024-12-05 DIAGNOSIS — E03.9 ACQUIRED HYPOTHYROIDISM: ICD-10-CM

## 2024-12-06 LAB — TSH SERPL DL<=0.005 MIU/L-ACNC: 1.62 UIU/ML (ref 0.27–4.2)

## 2024-12-12 ENCOUNTER — OFFICE VISIT (OUTPATIENT)
Dept: FAMILY MEDICINE CLINIC | Age: 88
End: 2024-12-12

## 2024-12-12 VITALS
OXYGEN SATURATION: 95 % | RESPIRATION RATE: 16 BRPM | TEMPERATURE: 96.9 F | WEIGHT: 197.4 LBS | SYSTOLIC BLOOD PRESSURE: 112 MMHG | BODY MASS INDEX: 28.32 KG/M2 | DIASTOLIC BLOOD PRESSURE: 70 MMHG | HEART RATE: 87 BPM

## 2024-12-12 DIAGNOSIS — N18.31 STAGE 3A CHRONIC KIDNEY DISEASE (HCC): ICD-10-CM

## 2024-12-12 DIAGNOSIS — I48.21 PERMANENT ATRIAL FIBRILLATION (HCC): Primary | ICD-10-CM

## 2024-12-12 DIAGNOSIS — I50.32 CHRONIC HEART FAILURE WITH PRESERVED EJECTION FRACTION (HCC): ICD-10-CM

## 2024-12-12 ASSESSMENT — ENCOUNTER SYMPTOMS
SINUS PRESSURE: 0
EYE ITCHING: 0
DIARRHEA: 0
SORE THROAT: 0
BACK PAIN: 0
NAUSEA: 0
COUGH: 0
EYE REDNESS: 0
CHEST TIGHTNESS: 0
SHORTNESS OF BREATH: 0
BLOOD IN STOOL: 0
RHINORRHEA: 0
WHEEZING: 0
ABDOMINAL PAIN: 0
CONSTIPATION: 0
VOMITING: 0
COLOR CHANGE: 0

## 2024-12-12 NOTE — PROGRESS NOTES
Edmundo Garcia (:  1934) is a 89 y.o. male,Established patient, here for evaluation of the following chief complaint(s):  Medication Check      ASSESSMENT/PLAN:  1. Permanent atrial fibrillation (HCC)  Assessment & Plan:  Reviewed notes from cardio at [facility] in   No longer on amio  Stable, controlled  No changes  Continue current treatment plan      2. Stage 3a chronic kidney disease (HCC)  Assessment & Plan:   Stable, controlled  No changes  Continue current treatment plan   Lab Results   Component Value Date    CREATININE 1.44 (H) 2024    BUN 26 2024     2024    K 5.2 (H) 2024     2024    CO2 27 2024     Lab Results   Component Value Date     2024    K 5.2 (H) 2024     2024    CO2 27 2024    BUN 26 2024    CREATININE 1.44 (H) 2024    GLUCOSE 83 2024    CALCIUM 8.4 (L) 2024    BILITOT 1.4 (H) 2024    ALKPHOS 70 2024    AST 16 2024    ALT 10 2024    LABGLOM 44 (A) 2023    GFRAA >60 2022    AGRATIO 2.1 2023    GLOB 2.4 2023         3. Chronic heart failure with preserved ejection fraction (HCC)  Assessment & Plan:   Stable, controlled  No changes  Continue current treatment plan   Ef 55%  Entresto, lasix, antoni  No sglt  Reviewed notes from [facility] cardiology in       No follow-ups on file.    SUBJECTIVE/OBJECTIVE:    Patient the office today for routine medical exam and med check.  He continues to follow with cardiology through the [facility] network.  He is taking his medications as prescribed.  He continues to experience frequent urination however he knows it is related to his diuretics.  He monitors his weight daily and he fluctuates between 1 92-1 94 depending on the day.  He denies any chest pain shortness of breath or difficulty breathing denies any swelling of bilateral lower extremities.  He continues to wear his compression stockings

## 2024-12-12 NOTE — ASSESSMENT & PLAN NOTE
Stable, controlled  No changes  Continue current treatment plan   Ef 55%  Entresto, lasix, antoni  No sglt  Reviewed notes from [facility] cardiology in [place]

## 2025-02-26 ENCOUNTER — OFFICE VISIT (OUTPATIENT)
Dept: FAMILY MEDICINE CLINIC | Age: 89
End: 2025-02-26

## 2025-02-26 VITALS
SYSTOLIC BLOOD PRESSURE: 108 MMHG | WEIGHT: 200.2 LBS | DIASTOLIC BLOOD PRESSURE: 86 MMHG | RESPIRATION RATE: 16 BRPM | OXYGEN SATURATION: 98 % | BODY MASS INDEX: 28.73 KG/M2 | TEMPERATURE: 96.8 F | HEART RATE: 111 BPM

## 2025-02-26 DIAGNOSIS — Z01.818 PRE-OP EVALUATION: Primary | ICD-10-CM

## 2025-02-26 DIAGNOSIS — I48.21 PERMANENT ATRIAL FIBRILLATION (HCC): ICD-10-CM

## 2025-02-26 DIAGNOSIS — N18.32 STAGE 3B CHRONIC KIDNEY DISEASE (HCC): ICD-10-CM

## 2025-02-26 DIAGNOSIS — I50.32 CHRONIC HEART FAILURE WITH PRESERVED EJECTION FRACTION (HCC): ICD-10-CM

## 2025-02-26 DIAGNOSIS — E11.65 TYPE 2 DIABETES MELLITUS WITH HYPERGLYCEMIA, WITHOUT LONG-TERM CURRENT USE OF INSULIN (HCC): ICD-10-CM

## 2025-02-26 SDOH — ECONOMIC STABILITY: FOOD INSECURITY: WITHIN THE PAST 12 MONTHS, YOU WORRIED THAT YOUR FOOD WOULD RUN OUT BEFORE YOU GOT MONEY TO BUY MORE.: NEVER TRUE

## 2025-02-26 SDOH — ECONOMIC STABILITY: FOOD INSECURITY: WITHIN THE PAST 12 MONTHS, THE FOOD YOU BOUGHT JUST DIDN'T LAST AND YOU DIDN'T HAVE MONEY TO GET MORE.: NEVER TRUE

## 2025-02-26 ASSESSMENT — ENCOUNTER SYMPTOMS
CHEST TIGHTNESS: 0
BLOOD IN STOOL: 0
NAUSEA: 0
DIARRHEA: 0
EYE ITCHING: 0
ABDOMINAL PAIN: 0
SHORTNESS OF BREATH: 0
COLOR CHANGE: 0
CONSTIPATION: 0
SORE THROAT: 0
RHINORRHEA: 0
BACK PAIN: 0
WHEEZING: 0
VOMITING: 0
EYE REDNESS: 0
SINUS PRESSURE: 0
COUGH: 0

## 2025-02-26 ASSESSMENT — PATIENT HEALTH QUESTIONNAIRE - PHQ9
SUM OF ALL RESPONSES TO PHQ QUESTIONS 1-9: 0
SUM OF ALL RESPONSES TO PHQ QUESTIONS 1-9: 0
SUM OF ALL RESPONSES TO PHQ9 QUESTIONS 1 & 2: 0
1. LITTLE INTEREST OR PLEASURE IN DOING THINGS: NOT AT ALL
SUM OF ALL RESPONSES TO PHQ QUESTIONS 1-9: 0
SUM OF ALL RESPONSES TO PHQ QUESTIONS 1-9: 0
2. FEELING DOWN, DEPRESSED OR HOPELESS: NOT AT ALL

## 2025-02-26 NOTE — ASSESSMENT & PLAN NOTE
Perioperative risk related to the patient's upcoming surgery is considered low. he is cleared for surgery.  Pre-op exam was completed on 2/26/25 1:19 PM.

## 2025-02-26 NOTE — ASSESSMENT & PLAN NOTE
Reviewed notes from cardio at [facility] [place]  No longer on amio  Stable, controlled  No changes  Continue current treatment plan

## 2025-02-26 NOTE — ASSESSMENT & PLAN NOTE
Stable, controlled  No changes  Continue current treatment plan   Ef 55%  Entresto, lasix, antoni  No sglt  Reviewed notes from [facility] cardiology in [place]

## 2025-02-26 NOTE — PROGRESS NOTES
equal, round, and reactive to light.   Cardiovascular:      Rate and Rhythm: Normal rate. Rhythm irregularly irregular.      Pulses: Normal pulses.      Heart sounds: Normal heart sounds.   Pulmonary:      Effort: Pulmonary effort is normal.      Breath sounds: Normal breath sounds.   Abdominal:      General: Abdomen is flat. Bowel sounds are normal.      Palpations: Abdomen is soft.      Tenderness: There is no abdominal tenderness.   Musculoskeletal:         General: Normal range of motion.      Cervical back: Normal range of motion and neck supple.   Skin:     General: Skin is warm and dry.   Neurological:      General: No focal deficit present.      Mental Status: He is alert and oriented to person, place, and time.   Psychiatric:         Mood and Affect: Mood normal.         Behavior: Behavior normal.            Medication Review  Current Outpatient Medications on File Prior to Visit   Medication Sig Dispense Refill    levothyroxine (SYNTHROID) 175 MCG tablet Take 1 tablet by mouth daily 90 tablet 1    furosemide (LASIX) 20 MG tablet Take 1 tablet by mouth daily      ENTRESTO 24-26 MG per tablet Take 1 tablet by mouth 2 times daily      spironolactone (ALDACTONE) 25 MG tablet Take 1 tablet by mouth daily      Handicap Placard MISC by Does not apply route Does not  2 each 0    apixaban (ELIQUIS) 5 MG TABS tablet Take 1 tablet by mouth 2 times daily 180 tablet 1    Vitamin D (CHOLECALCIFEROL) 1000 UNITS CAPS capsule Take 1 capsule by mouth daily 30 capsule 0    simvastatin (ZOCOR) 20 MG tablet TAKE 1 TABLET IN THE EVENING 90 tablet 1    latanoprost (XALATAN) 0.005 % ophthalmic solution Place 1 drop into both eyes nightly      aspirin EC 81 MG EC tablet Take 1 tablet by mouth daily. 30 tablet 12    tamsulosin (FLOMAX) 0.4 MG capsule Take 1 capsule by mouth daily for 5 doses 5 capsule 0     No current facility-administered medications on file prior to visit.       Assessment:       1. Pre-op evaluation    2.

## 2025-03-28 PROBLEM — Z01.818 PRE-OP EVALUATION: Status: RESOLVED | Noted: 2025-02-26 | Resolved: 2025-03-28

## 2025-04-06 RX ORDER — LEVOTHYROXINE SODIUM 175 UG/1
175 TABLET ORAL DAILY
Qty: 90 TABLET | Refills: 1 | Status: SHIPPED | OUTPATIENT
Start: 2025-04-06

## 2025-04-10 ENCOUNTER — OFFICE VISIT (OUTPATIENT)
Dept: FAMILY MEDICINE CLINIC | Age: 89
End: 2025-04-10
Payer: MEDICARE

## 2025-04-10 VITALS
SYSTOLIC BLOOD PRESSURE: 114 MMHG | OXYGEN SATURATION: 94 % | WEIGHT: 197 LBS | DIASTOLIC BLOOD PRESSURE: 70 MMHG | TEMPERATURE: 96.8 F | BODY MASS INDEX: 28.27 KG/M2 | RESPIRATION RATE: 16 BRPM | HEART RATE: 104 BPM

## 2025-04-10 DIAGNOSIS — E03.9 ACQUIRED HYPOTHYROIDISM: ICD-10-CM

## 2025-04-10 DIAGNOSIS — N18.32 STAGE 3B CHRONIC KIDNEY DISEASE (HCC): ICD-10-CM

## 2025-04-10 DIAGNOSIS — R35.89 POLYURIA: ICD-10-CM

## 2025-04-10 DIAGNOSIS — N18.31 STAGE 3A CHRONIC KIDNEY DISEASE (HCC): ICD-10-CM

## 2025-04-10 DIAGNOSIS — I50.32 CHRONIC HEART FAILURE WITH PRESERVED EJECTION FRACTION (HCC): ICD-10-CM

## 2025-04-10 DIAGNOSIS — I50.32 CHRONIC HEART FAILURE WITH PRESERVED EJECTION FRACTION (HCC): Primary | ICD-10-CM

## 2025-04-10 DIAGNOSIS — I48.21 PERMANENT ATRIAL FIBRILLATION (HCC): ICD-10-CM

## 2025-04-10 DIAGNOSIS — R97.20 ELEVATED PSA: ICD-10-CM

## 2025-04-10 DIAGNOSIS — E78.2 MIXED HYPERLIPIDEMIA: ICD-10-CM

## 2025-04-10 LAB
25(OH)D3 SERPL-MCNC: 26.3 NG/ML
ALBUMIN SERPL-MCNC: 4 G/DL (ref 3.4–5)
ALBUMIN/GLOB SERPL: 1.9 {RATIO} (ref 1.1–2.2)
ALP SERPL-CCNC: 75 U/L (ref 40–129)
ALT SERPL-CCNC: 13 U/L (ref 10–40)
ANION GAP SERPL CALCULATED.3IONS-SCNC: 10 MMOL/L (ref 3–16)
AST SERPL-CCNC: 19 U/L (ref 15–37)
BILIRUB SERPL-MCNC: 1.2 MG/DL (ref 0–1)
BUN SERPL-MCNC: 36 MG/DL (ref 7–20)
CALCIUM SERPL-MCNC: 8.6 MG/DL (ref 8.3–10.6)
CHLORIDE SERPL-SCNC: 104 MMOL/L (ref 99–110)
CHOLEST SERPL-MCNC: 120 MG/DL (ref 0–199)
CO2 SERPL-SCNC: 26 MMOL/L (ref 21–32)
CREAT SERPL-MCNC: 1.5 MG/DL (ref 0.8–1.3)
DEPRECATED RDW RBC AUTO: 13.9 % (ref 12.4–15.4)
GFR SERPLBLD CREATININE-BSD FMLA CKD-EPI: 44 ML/MIN/{1.73_M2}
GLUCOSE SERPL-MCNC: 148 MG/DL (ref 70–99)
HCT VFR BLD AUTO: 38.5 % (ref 40.5–52.5)
HDLC SERPL-MCNC: 51 MG/DL (ref 40–60)
HGB BLD-MCNC: 13 G/DL (ref 13.5–17.5)
LDL CHOLESTEROL: 53 MG/DL
MCH RBC QN AUTO: 30.3 PG (ref 26–34)
MCHC RBC AUTO-ENTMCNC: 33.7 G/DL (ref 31–36)
MCV RBC AUTO: 90.1 FL (ref 80–100)
PLATELET # BLD AUTO: 182 K/UL (ref 135–450)
PMV BLD AUTO: 9.2 FL (ref 5–10.5)
POTASSIUM SERPL-SCNC: 5.7 MMOL/L (ref 3.5–5.1)
PROT SERPL-MCNC: 6.1 G/DL (ref 6.4–8.2)
PSA SERPL DL<=0.01 NG/ML-MCNC: 11 NG/ML (ref 0–4)
RBC # BLD AUTO: 4.27 M/UL (ref 4.2–5.9)
SODIUM SERPL-SCNC: 140 MMOL/L (ref 136–145)
T4 FREE SERPL-MCNC: 1.7 NG/DL (ref 0.9–1.8)
TRIGL SERPL-MCNC: 78 MG/DL (ref 0–150)
TSH SERPL DL<=0.005 MIU/L-ACNC: 0.19 UIU/ML (ref 0.27–4.2)
VLDLC SERPL CALC-MCNC: 16 MG/DL
WBC # BLD AUTO: 5.4 K/UL (ref 4–11)

## 2025-04-10 PROCEDURE — 1036F TOBACCO NON-USER: CPT | Performed by: NURSE PRACTITIONER

## 2025-04-10 PROCEDURE — 99215 OFFICE O/P EST HI 40 MIN: CPT | Performed by: NURSE PRACTITIONER

## 2025-04-10 PROCEDURE — G8417 CALC BMI ABV UP PARAM F/U: HCPCS | Performed by: NURSE PRACTITIONER

## 2025-04-10 PROCEDURE — G2211 COMPLEX E/M VISIT ADD ON: HCPCS | Performed by: NURSE PRACTITIONER

## 2025-04-10 PROCEDURE — G8427 DOCREV CUR MEDS BY ELIG CLIN: HCPCS | Performed by: NURSE PRACTITIONER

## 2025-04-10 PROCEDURE — 1159F MED LIST DOCD IN RCRD: CPT | Performed by: NURSE PRACTITIONER

## 2025-04-10 PROCEDURE — 1123F ACP DISCUSS/DSCN MKR DOCD: CPT | Performed by: NURSE PRACTITIONER

## 2025-04-10 ASSESSMENT — ENCOUNTER SYMPTOMS
NAUSEA: 0
SHORTNESS OF BREATH: 0
CHEST TIGHTNESS: 0
VOMITING: 0
DIARRHEA: 0
WHEEZING: 0
RHINORRHEA: 0
SORE THROAT: 0
SINUS PRESSURE: 0
EYE ITCHING: 0
BLOOD IN STOOL: 0
EYE REDNESS: 0
COUGH: 0
ABDOMINAL PAIN: 0
CONSTIPATION: 0
COLOR CHANGE: 0
BACK PAIN: 0

## 2025-04-10 NOTE — ASSESSMENT & PLAN NOTE
He is currently on apixaban (Eliquis) for atrial fibrillation.  No changes to this medication are recommended at this time.  He should continue taking apixaban as prescribed.  He should report any symptoms of shortness of breath or chest pain.

## 2025-04-10 NOTE — ASSESSMENT & PLAN NOTE
He is currently taking levothyroxine.  No changes to this medication are recommended at this time.  He should continue taking levothyroxine as prescribed.  He should report any symptoms of fatigue or weight changes.

## 2025-04-10 NOTE — PROGRESS NOTES
Edmundo Garcia (:  1934) is a 89 y.o. male,Established patient, here for evaluation of the following chief complaint(s):  Medication Check      ASSESSMENT/PLAN:  1. Chronic heart failure with preserved ejection fraction (HCC)  Assessment & Plan:  Stable, controlled  No changes  He reports frequent urination and difficulty holding urine, which he attributes to his current medication regimen.  Blood pressure today was 114/70.  The dosage of Lasix will be adjusted to every other day, to be taken in the morning 30 minutes after levothyroxine.  He is instructed to monitor his weight daily. If there is an increase of 2 pounds within 24 hours or 5 pounds within a week, he should revert to the daily Lasix regimen. He should report any symptoms of shortness of breath, chest pain, or increased leg swelling.  Orders:  -     CBC; Future  -     Comprehensive Metabolic Panel; Future  -     Lipid, Fasting; Future  -     TSH reflex to FT4; Future  -     Vitamin D 25 Hydroxy; Future  -     PSA, Prostatic Specific Antigen ([place]); Future  2. Permanent atrial fibrillation (HCC)  Assessment & Plan:  He is currently on apixaban (Eliquis) for atrial fibrillation.  No changes to this medication are recommended at this time.  He should continue taking apixaban as prescribed.  He should report any symptoms of shortness of breath or chest pain.  Orders:  -     CBC; Future  -     Comprehensive Metabolic Panel; Future  -     Lipid, Fasting; Future  -     TSH reflex to FT4; Future  -     Vitamin D 25 Hydroxy; Future  -     PSA, Prostatic Specific Antigen ([place]); Future  3. Stage 3b chronic kidney disease (HCC)  Assessment & Plan:   Stable, controlled  No changes  Continue current treatment plan   Lab Results   Component Value Date    CREATININE 1.44 (H) 2024    BUN 26 2024     2024    K 5.2 (H) 2024     2024    CO2 27 2024     Lab Results   Component Value Date

## 2025-04-10 NOTE — ASSESSMENT & PLAN NOTE
Stable, controlled  No changes  Continue current treatment plan   Lab Results   Component Value Date    CREATININE 1.44 (H) 08/14/2024    BUN 26 08/14/2024     08/14/2024    K 5.2 (H) 08/14/2024     08/14/2024    CO2 27 08/14/2024     Lab Results   Component Value Date     08/14/2024    K 5.2 (H) 08/14/2024     08/14/2024    CO2 27 08/14/2024    BUN 26 08/14/2024    CREATININE 1.44 (H) 08/14/2024    GLUCOSE 83 08/14/2024    CALCIUM 8.4 (L) 08/14/2024    BILITOT 1.4 (H) 08/14/2024    ALKPHOS 70 08/14/2024    AST 16 08/14/2024    ALT 10 08/14/2024    LABGLOM 44 (A) 11/13/2023    GFRAA >60 07/22/2022    AGRATIO 2.1 11/13/2023    GLOB 2.4 08/02/2023       Stable, controlled  No changes  Continue current treatment plan   Lab work ordered

## 2025-04-10 NOTE — ASSESSMENT & PLAN NOTE
Stable, controlled  No changes  He reports frequent urination and difficulty holding urine, which he attributes to his current medication regimen.  Blood pressure today was 114/70.  The dosage of Lasix will be adjusted to every other day, to be taken in the morning 30 minutes after levothyroxine.  He is instructed to monitor his weight daily. If there is an increase of 2 pounds within 24 hours or 5 pounds within a week, he should revert to the daily Lasix regimen. He should report any symptoms of shortness of breath, chest pain, or increased leg swelling.

## 2025-04-10 NOTE — ASSESSMENT & PLAN NOTE
He is currently taking simvastatin.  A cholesterol panel will be ordered as it has been over a year since his last check.  He should continue taking simvastatin as prescribed.  He should report any symptoms of muscle pain or weakness.

## 2025-04-10 NOTE — ASSESSMENT & PLAN NOTE
He is taking Flomax (tamsulosin) for urinary symptoms.  No changes to this medication are recommended at this time.  He should continue taking Flomax as prescribed.  He should report any symptoms of urinary retention or discomfort.

## 2025-04-11 ENCOUNTER — TELEPHONE (OUTPATIENT)
Dept: FAMILY MEDICINE CLINIC | Age: 89
End: 2025-04-11

## 2025-04-14 ENCOUNTER — RESULTS FOLLOW-UP (OUTPATIENT)
Dept: FAMILY MEDICINE CLINIC | Age: 89
End: 2025-04-14

## 2025-04-14 DIAGNOSIS — E87.5 HYPERKALEMIA: Primary | ICD-10-CM

## 2025-07-10 ENCOUNTER — OFFICE VISIT (OUTPATIENT)
Dept: FAMILY MEDICINE CLINIC | Age: 89
End: 2025-07-10

## 2025-07-10 VITALS
OXYGEN SATURATION: 100 % | SYSTOLIC BLOOD PRESSURE: 96 MMHG | DIASTOLIC BLOOD PRESSURE: 64 MMHG | HEART RATE: 92 BPM | WEIGHT: 201.2 LBS | BODY MASS INDEX: 28.87 KG/M2 | TEMPERATURE: 96.8 F | RESPIRATION RATE: 16 BRPM

## 2025-07-10 DIAGNOSIS — I50.32 CHRONIC HEART FAILURE WITH PRESERVED EJECTION FRACTION (HCC): ICD-10-CM

## 2025-07-10 DIAGNOSIS — N18.32 STAGE 3B CHRONIC KIDNEY DISEASE (HCC): Primary | ICD-10-CM

## 2025-07-10 DIAGNOSIS — I48.21 PERMANENT ATRIAL FIBRILLATION (HCC): ICD-10-CM

## 2025-07-10 ASSESSMENT — PATIENT HEALTH QUESTIONNAIRE - PHQ9
2. FEELING DOWN, DEPRESSED OR HOPELESS: NOT AT ALL
SUM OF ALL RESPONSES TO PHQ QUESTIONS 1-9: 0
1. LITTLE INTEREST OR PLEASURE IN DOING THINGS: NOT AT ALL
SUM OF ALL RESPONSES TO PHQ QUESTIONS 1-9: 0

## 2025-07-10 ASSESSMENT — ENCOUNTER SYMPTOMS
BACK PAIN: 0
COLOR CHANGE: 0
RHINORRHEA: 0
SORE THROAT: 0
WHEEZING: 0
SINUS PRESSURE: 0
ABDOMINAL PAIN: 0
COUGH: 0
EYE ITCHING: 0
NAUSEA: 0
CHEST TIGHTNESS: 0
EYE REDNESS: 0
DIARRHEA: 0
CONSTIPATION: 0
BLOOD IN STOOL: 0
SHORTNESS OF BREATH: 0
VOMITING: 0

## 2025-07-10 NOTE — PROGRESS NOTES
Edmundo Garcia (:  1934) is a 89 y.o. male,Established patient, here for evaluation of the following chief complaint(s):  3 Month Follow-Up      ASSESSMENT/PLAN:  1. Stage 3b chronic kidney disease (HCC)  2. Permanent atrial fibrillation (HCC)  3. Chronic heart failure with preserved ejection fraction (HCC)      Assessment & Plan  1. Heart failure.  - Blood pressure readings are within acceptable limits today.  - Potential interactions between medications, including Entresto and Lasix, were discussed.  - Explained that these medications help manage heart failure by reducing fluid overload and improving cardiac function.  - Advised to continue current medication regimen, including Lasix and Entresto, as discontinuing Lasix could lead to fluid accumulation.    2. Ulcerative colitis.  - Reports issues with bowel movements, including incomplete evacuation and frequent stools.  - Given history of ulcerative colitis, it is important to avoid medications that could exacerbate the condition.  - Advised to consult with gastroenterologist for further evaluation and management of symptoms.  - Metamucil prescribed to help with bowel movement regularity, but patient reports it has not been effective.    Follow-up  - A follow-up visit is scheduled in 3 to 4 months.    No follow-ups on file.    SUBJECTIVE/OBJECTIVE:    History of Present Illness  The patient presents for evaluation of heart failure and a history of ulcerative colitis.    He reports that he has been responding well to Lasix, with no weight gain exceeding 2 pounds. He does not experience dizziness or restlessness but does report fatigue, which he attributes to his age. He mentions lifelong irritability and muscle weakness but does not suffer from cramps. He has not had any falls and is cautious to prevent them. His blood pressure remains on the lower side, as it was when he was discharged from the hospital 2 years ago. He expresses a desire to discontinue Lasix

## (undated) DEVICE — SUTURE NRLN SZ 4-0 L18IN NONABSORBABLE BLK L13MM TF 1/2 CIR C584D

## (undated) DEVICE — SPONGE LAP W18XL18IN WHT COT 4 PLY FLD STRUNG RADPQ DISP ST 2 PER PACK

## (undated) DEVICE — ELECTROSURGICAL PENCIL ROCKER SWITCH NON COATED BLADE ELECTRODE 10 FT (3 M) CORD HOLSTER: Brand: MEGADYNE

## (undated) DEVICE — MERCY FAIRFIELD TURNOVER KIT: Brand: MEDLINE INDUSTRIES, INC.

## (undated) DEVICE — SPONGE,LAP,18"X18",DLX,XR,ST,5/PK,40/PK: Brand: MEDLINE

## (undated) DEVICE — INTENDED FOR TISSUE SEPARATION, AND OTHER PROCEDURES THAT REQUIRE A SHARP SURGICAL BLADE TO PUNCTURE OR CUT.: Brand: BARD-PARKER ® CARBON RIB-BACK BLADES

## (undated) DEVICE — SYRINGE MED 10ML LUERLOCK TIP W/O SFTY DISP

## (undated) DEVICE — CONTAINER SPEC 480ML CLR POLYSTYR 10% NEUT BUFF FRMLN ZN

## (undated) DEVICE — TUBING SUCT 10FR MAL ALUM SHFT FN CAP VENT UNIV CONN W/ OBT

## (undated) DEVICE — SKIN MARKER FINE TIP: Brand: DEVON

## (undated) DEVICE — APPLICATOR MEDICATED 26 CC SOLUTION HI LT ORNG CHLORAPREP

## (undated) DEVICE — FLUID TRAP FOR MINIVAC ES EQUIP FLD TRAP

## (undated) DEVICE — CLIP LIG L235CM RESOL 360 BX/20

## (undated) DEVICE — T-DRAPE,EXTREMITY,STERILE: Brand: MEDLINE

## (undated) DEVICE — SUTURE MCRYL SZ 3-0 L27IN ABSRB UD L19MM PS-2 3/8 CIR PRIM Y427H

## (undated) DEVICE — BANDAGE COMPR W6INXL10YD ST M E WHITE/BEIGE

## (undated) DEVICE — DRESSING,GAUZE,XEROFORM,CURAD,1"X8",ST: Brand: CURAD

## (undated) DEVICE — HOLDER SCALP PLAS G STD

## (undated) DEVICE — BIT DRL L100MM DIA2.5MM FOR SM FRAG UNIV LOK SYS

## (undated) DEVICE — TRAP POLYP ETRAP

## (undated) DEVICE — PADDING CAST W6INXL4YD ST COT RAYON MICROPLEATED HIGHLY

## (undated) DEVICE — SUTURE PROL SZ 5-0 L18IN NONABSORBABLE BLU L13MM P-3 3/8 8698G

## (undated) DEVICE — DRAPE IRRIG FLD WRM W44XL44IN W/ AORN STD PRTBL INTRATEMP

## (undated) DEVICE — COTTON UNDERCAST PADDING,CRIMPED FINISH: Brand: WEBRIL

## (undated) DEVICE — ENDOSCOPY KIT: Brand: MEDLINE INDUSTRIES, INC.

## (undated) DEVICE — COVER LT HNDL BLU PLAS

## (undated) DEVICE — WET SKIN PREP TRAY: Brand: MEDLINE INDUSTRIES, INC.

## (undated) DEVICE — MAJOR SET UP PK

## (undated) DEVICE — TRAY PREP DRY W/ PREM GLV 2 APPL 6 SPNG 2 UNDPD 1 OVERWRAP

## (undated) DEVICE — ELECTRODE PT RET AD L9FT HI MOIST COND ADH HYDRGEL CORDED

## (undated) DEVICE — SURE SET-DOUBLE BASIN-LF: Brand: MEDLINE INDUSTRIES, INC.

## (undated) DEVICE — TOWEL,OR,DSP,ST,BLUE,STD,6/PK,12PK/CS: Brand: MEDLINE

## (undated) DEVICE — PLATE ES AD W 9FT CRD 2

## (undated) DEVICE — HYPODERMIC SAFETY NEEDLE: Brand: MAGELLAN

## (undated) DEVICE — FRAZIER SUCTION INSTRUMENT 8 FR W/CONTROL VENT & OBTURATOR: Brand: FRAZIER

## (undated) DEVICE — STERILE LATEX POWDER-FREE SURGICAL GLOVESWITH NITRILE COATING: Brand: PROTEXIS

## (undated) DEVICE — TOWEL,OR,DSP,ST,BLUE,STD,4/PK,20PK/CS: Brand: MEDLINE

## (undated) DEVICE — SURGICAL SET UP - SURE SET: Brand: MEDLINE INDUSTRIES, INC.

## (undated) DEVICE — NEEDLE HYPO 25GA L0.625IN BLU POLYPR HUB S STL REG BVL STR

## (undated) DEVICE — PENCIL SMK EVAC TELSCP 3 M TBNG

## (undated) DEVICE — DRAPE,U/ SHT,SPLIT,PLAS,STERIL: Brand: MEDLINE

## (undated) DEVICE — SHEET,DRAPE,53X77,STERILE: Brand: MEDLINE

## (undated) DEVICE — MARKER SKIN GENTIAN VLT 2 TIP W RUL PREP RESIST INK SKIN

## (undated) DEVICE — DRESSING,GAUZE,XEROFORM,CURAD,5"X9",ST: Brand: CURAD

## (undated) DEVICE — C-ARM: Brand: UNBRANDED

## (undated) DEVICE — STANDARD HYPODERMIC NEEDLE,POLYPROPYLENE HUB: Brand: MONOJECT

## (undated) DEVICE — BANDAGE,ELASTIC,ESMARK,STERILE,6"X9',LF: Brand: MEDLINE

## (undated) DEVICE — FORCEPS BX 240CM 2.4MM L NDL RAD JAW 4 M00513334

## (undated) DEVICE — SUTURE CHROMIC GUT SZ 4-0 L27IN ABSRB BRN L17MM RB-1 1/2 U203H

## (undated) DEVICE — ZIMMER® STERILE DISPOSABLE TOURNIQUET CUFF WITH PLC, DUAL PORT, SINGLE BLADDER, 18 IN. (46 CM)

## (undated) DEVICE — SUTURE NONABSORBABLE MONOFILAMENT 4-0 PS-2 18 IN BLU PROLENE 8682H

## (undated) DEVICE — SPLINT ORTH DBL SIDE 15 FTX4 IN RL PADDED FIBERGLASS LF

## (undated) DEVICE — COTTON BALLS: Brand: DEROYAL

## (undated) DEVICE — BIT DRL DIA2MM STD QUIK CONN FOR PERIARTC LOK PLT SYS

## (undated) DEVICE — GARMENT,MEDLINE,DVT,INT,CALF,MED, GEN2: Brand: MEDLINE

## (undated) DEVICE — GAUZE,SPONGE,4"X4",8PLY,STRL,LF,10/TRAY: Brand: MEDLINE

## (undated) DEVICE — GLOVE ORANGE PI 7 1/2   MSG9075

## (undated) DEVICE — SUTURE VCRL + SZ 2-0 L18IN ABSRB UD CT1 L36MM 1/2 CIR VCP839D

## (undated) DEVICE — INTENDED FOR TISSUE SEPARATION, AND OTHER PROCEDURES THAT REQUIRE A SHARP SURGICAL BLADE TO PUNCTURE OR CUT.: Brand: BARD-PARKER ® STAINLESS STEEL BLADES

## (undated) DEVICE — SNARE ENDOSCP 11 MM BRAIDED WIRE CAPTFLX DISP

## (undated) DEVICE — ZIMMER® STERILE DISPOSABLE TOURNIQUET CUFF WITH PLC, DUAL PORT, SINGLE BLADDER, 24 IN. (61 CM)

## (undated) DEVICE — JEWISH HOSPITAL TURNOVER KIT: Brand: MEDLINE INDUSTRIES, INC.

## (undated) DEVICE — ELECTRODE ELECSURG NDL 2.8 INX7.2 CM COAT INSUL EDGE

## (undated) DEVICE — GLOVE ORANGE PI 8   MSG9080

## (undated) DEVICE — SYRINGE, LUER LOCK, 10ML: Brand: MEDLINE

## (undated) DEVICE — YANKAUER,OPEN TIP,W/O VENT,STERILE: Brand: MEDLINE INDUSTRIES, INC.